# Patient Record
Sex: FEMALE | Race: WHITE | NOT HISPANIC OR LATINO | Employment: OTHER | URBAN - METROPOLITAN AREA
[De-identification: names, ages, dates, MRNs, and addresses within clinical notes are randomized per-mention and may not be internally consistent; named-entity substitution may affect disease eponyms.]

---

## 2017-01-04 DIAGNOSIS — E11.9 TYPE 2 DIABETES MELLITUS WITHOUT COMPLICATION, WITHOUT LONG-TERM CURRENT USE OF INSULIN: ICD-10-CM

## 2017-01-04 RX ORDER — METFORMIN HYDROCHLORIDE 500 MG/1
1000 TABLET, EXTENDED RELEASE ORAL 2 TIMES DAILY WITH MEALS
Qty: 360 TABLET | Refills: 3 | OUTPATIENT
Start: 2017-01-04

## 2017-01-04 NOTE — TELEPHONE ENCOUNTER
----- Message from Olya Madrigal sent at 1/3/2017  3:23 PM CST -----  Contact:  call/[phone]    Calling to   Discuss   Patient  metformin //  Pt  Is    Taking    More and   Script  Needs  To  Be   Updated  Pt is  Out  Of  meds // please call

## 2017-01-25 ENCOUNTER — LAB VISIT (OUTPATIENT)
Dept: LAB | Facility: HOSPITAL | Age: 52
End: 2017-01-25
Attending: INTERNAL MEDICINE
Payer: COMMERCIAL

## 2017-01-25 DIAGNOSIS — E11.9 TYPE 2 DIABETES MELLITUS WITHOUT COMPLICATION, WITHOUT LONG-TERM CURRENT USE OF INSULIN: ICD-10-CM

## 2017-01-25 DIAGNOSIS — M05.79 SEROPOSITIVE RHEUMATOID ARTHRITIS OF MULTIPLE SITES: ICD-10-CM

## 2017-01-25 DIAGNOSIS — E03.9 HYPOTHYROIDISM, UNSPECIFIED TYPE: ICD-10-CM

## 2017-01-25 LAB
ALBUMIN SERPL BCP-MCNC: 3.5 G/DL
ALP SERPL-CCNC: 93 U/L
ALT SERPL W/O P-5'-P-CCNC: 36 U/L
ANION GAP SERPL CALC-SCNC: 8 MMOL/L
ANION GAP SERPL CALC-SCNC: 8 MMOL/L
AST SERPL-CCNC: 26 U/L
BASOPHILS # BLD AUTO: ABNORMAL K/UL
BASOPHILS NFR BLD: 0 %
BILIRUB SERPL-MCNC: 0.2 MG/DL
BUN SERPL-MCNC: 18 MG/DL
BUN SERPL-MCNC: 18 MG/DL
CALCIUM SERPL-MCNC: 9.9 MG/DL
CALCIUM SERPL-MCNC: 9.9 MG/DL
CHLORIDE SERPL-SCNC: 105 MMOL/L
CHLORIDE SERPL-SCNC: 105 MMOL/L
CO2 SERPL-SCNC: 28 MMOL/L
CO2 SERPL-SCNC: 28 MMOL/L
CREAT SERPL-MCNC: 0.8 MG/DL
CREAT SERPL-MCNC: 0.8 MG/DL
CRP SERPL-MCNC: 8.4 MG/L
DIFFERENTIAL METHOD: ABNORMAL
EOSINOPHIL # BLD AUTO: ABNORMAL K/UL
EOSINOPHIL NFR BLD: 6 %
ERYTHROCYTE [DISTWIDTH] IN BLOOD BY AUTOMATED COUNT: 14.5 %
ERYTHROCYTE [SEDIMENTATION RATE] IN BLOOD BY WESTERGREN METHOD: 18 MM/HR
EST. GFR  (AFRICAN AMERICAN): >60 ML/MIN/1.73 M^2
EST. GFR  (AFRICAN AMERICAN): >60 ML/MIN/1.73 M^2
EST. GFR  (NON AFRICAN AMERICAN): >60 ML/MIN/1.73 M^2
EST. GFR  (NON AFRICAN AMERICAN): >60 ML/MIN/1.73 M^2
GLUCOSE SERPL-MCNC: 108 MG/DL
GLUCOSE SERPL-MCNC: 108 MG/DL
HCT VFR BLD AUTO: 42.4 %
HGB BLD-MCNC: 13.3 G/DL
LYMPHOCYTES # BLD AUTO: ABNORMAL K/UL
LYMPHOCYTES NFR BLD: 59 %
MCH RBC QN AUTO: 28.9 PG
MCHC RBC AUTO-ENTMCNC: 31.4 %
MCV RBC AUTO: 92 FL
MONOCYTES # BLD AUTO: ABNORMAL K/UL
MONOCYTES NFR BLD: 6 %
NEUTROPHILS NFR BLD: 29 %
PLATELET # BLD AUTO: 464 K/UL
PMV BLD AUTO: 10.7 FL
POTASSIUM SERPL-SCNC: 4.5 MMOL/L
POTASSIUM SERPL-SCNC: 4.5 MMOL/L
PROT SERPL-MCNC: 7.6 G/DL
RBC # BLD AUTO: 4.61 M/UL
SODIUM SERPL-SCNC: 141 MMOL/L
SODIUM SERPL-SCNC: 141 MMOL/L
T4 FREE SERPL-MCNC: 1.41 NG/DL
TSH SERPL DL<=0.005 MIU/L-ACNC: 0.04 UIU/ML
WBC # BLD AUTO: 13.66 K/UL

## 2017-01-25 PROCEDURE — 84439 ASSAY OF FREE THYROXINE: CPT

## 2017-01-25 PROCEDURE — 85651 RBC SED RATE NONAUTOMATED: CPT | Mod: PO

## 2017-01-25 PROCEDURE — 85007 BL SMEAR W/DIFF WBC COUNT: CPT

## 2017-01-25 PROCEDURE — 86140 C-REACTIVE PROTEIN: CPT

## 2017-01-25 PROCEDURE — 80053 COMPREHEN METABOLIC PANEL: CPT

## 2017-01-25 PROCEDURE — 85027 COMPLETE CBC AUTOMATED: CPT

## 2017-01-25 PROCEDURE — 84443 ASSAY THYROID STIM HORMONE: CPT

## 2017-01-25 PROCEDURE — 83036 HEMOGLOBIN GLYCOSYLATED A1C: CPT

## 2017-01-25 PROCEDURE — 36415 COLL VENOUS BLD VENIPUNCTURE: CPT | Mod: PO

## 2017-01-25 RX ORDER — DICLOFENAC SODIUM 75 MG/1
TABLET, DELAYED RELEASE ORAL
Qty: 180 TABLET | Refills: 0 | Status: SHIPPED | OUTPATIENT
Start: 2017-01-25 | End: 2018-01-25 | Stop reason: SDUPTHER

## 2017-01-26 ENCOUNTER — PATIENT OUTREACH (OUTPATIENT)
Dept: ADMINISTRATIVE | Facility: HOSPITAL | Age: 52
End: 2017-01-26
Payer: COMMERCIAL

## 2017-01-26 ENCOUNTER — PATIENT MESSAGE (OUTPATIENT)
Dept: FAMILY MEDICINE | Facility: CLINIC | Age: 52
End: 2017-01-26

## 2017-01-26 ENCOUNTER — PATIENT MESSAGE (OUTPATIENT)
Dept: ENDOCRINOLOGY | Facility: CLINIC | Age: 52
End: 2017-01-26

## 2017-01-26 LAB
ESTIMATED AVG GLUCOSE: 154 MG/DL
HBA1C MFR BLD HPLC: 7 %

## 2017-01-26 RX ORDER — LEVOTHYROXINE SODIUM 175 UG/1
175 TABLET ORAL DAILY
Qty: 90 TABLET | Refills: 3 | Status: SHIPPED | OUTPATIENT
Start: 2017-01-26 | End: 2018-01-30 | Stop reason: SDUPTHER

## 2017-01-26 NOTE — LETTER
February 3, 2017    Domi Olson Gibran  [address]  [place] [address]             [facility]  [address]  [facility] [address]  Phone: [phone] Dear Mrs. Leary:    We have tried to reach you by mychart unsuccessfully.    [facility] is committed to your overall health.  To help you get the most out of each of your visits, we will review your information to make sure you are up to date on all of your recommended tests and/or procedures.  We have Dr. Anoop Nicole listed as your primary care provider.     He has found that you may be due for your annual diabetic eye exam and colon cancer screening.     If you have had any of the above done at an outside facility, please let us know so I can update your record.  If you have a copy of these records, please provide a copy for us to scan into your chart.  If not, please provide that provider/facilities contact information so that we may obtain copies from that facility.     Otherwise, please schedule these appointments at your earliest convenience.  You are due for your diabetic follow up with Dr. Nicole in April of 2017.     If you have any questions or concerns, please don't hesitate to call.    Thank you for letting us care for you,  Yoanna Merino LPN Clinical Care Coordinator  [facility]  [phone]

## 2017-02-01 ENCOUNTER — OFFICE VISIT (OUTPATIENT)
Dept: ENDOCRINOLOGY | Facility: CLINIC | Age: 52
End: 2017-02-01
Payer: COMMERCIAL

## 2017-02-01 VITALS
DIASTOLIC BLOOD PRESSURE: 70 MMHG | HEIGHT: 63 IN | SYSTOLIC BLOOD PRESSURE: 122 MMHG | BODY MASS INDEX: 32.4 KG/M2 | HEART RATE: 83 BPM | WEIGHT: 182.88 LBS

## 2017-02-01 DIAGNOSIS — E66.9 OBESITY (BMI 30-39.9): ICD-10-CM

## 2017-02-01 DIAGNOSIS — M05.79 SEROPOSITIVE RHEUMATOID ARTHRITIS OF MULTIPLE SITES: ICD-10-CM

## 2017-02-01 DIAGNOSIS — E03.9 HYPOTHYROIDISM, UNSPECIFIED TYPE: ICD-10-CM

## 2017-02-01 DIAGNOSIS — E11.40 TYPE 2 DIABETES MELLITUS WITH DIABETIC NEUROPATHY, WITHOUT LONG-TERM CURRENT USE OF INSULIN: Primary | ICD-10-CM

## 2017-02-01 PROCEDURE — 99999 PR PBB SHADOW E&M-EST. PATIENT-LVL IV: CPT | Mod: PBBFAC,,, | Performed by: NURSE PRACTITIONER

## 2017-02-01 PROCEDURE — 2022F DILAT RTA XM EVC RTNOPTHY: CPT | Mod: S$GLB,,, | Performed by: NURSE PRACTITIONER

## 2017-02-01 PROCEDURE — 99214 OFFICE O/P EST MOD 30 MIN: CPT | Mod: S$GLB,,, | Performed by: NURSE PRACTITIONER

## 2017-02-01 PROCEDURE — 3060F POS MICROALBUMINURIA REV: CPT | Mod: S$GLB,,, | Performed by: NURSE PRACTITIONER

## 2017-02-01 PROCEDURE — 3045F PR MOST RECENT HEMOGLOBIN A1C LEVEL 7.0-9.0%: CPT | Mod: S$GLB,,, | Performed by: NURSE PRACTITIONER

## 2017-02-01 RX ORDER — PRAVASTATIN SODIUM 10 MG/1
10 TABLET ORAL DAILY
Qty: 30 TABLET | Refills: 6 | Status: SHIPPED | OUTPATIENT
Start: 2017-02-01 | End: 2017-10-18 | Stop reason: SDUPTHER

## 2017-02-01 NOTE — MR AVS SNAPSHOT
[facility]  [address]  [place] [address]  Phone: [phone]  Fax: [phone]                  Domi Leary   2017 9:00 AM   Office Visit    Description:  Female : 1965   Provider:  LLUY El,FNP-C   Department:  [facility]           Reason for Visit     Diabetes Mellitus           Diagnoses this Visit        Comments    Type 2 diabetes mellitus with diabetic neuropathy, without long-term current use of insulin    -  Primary     Hypothyroidism, unspecified type         Obesity (BMI 30-39.9)         Seropositive rheumatoid arthritis of multiple sites                To Do List           Future Appointments        Provider Department Dept Phone    2/15/2017 9:30 AM MAYELA King OD [place] - Optometry [phone]    2017 10:00 AM Humphrey Al MD [facility] - Rheumatology [phone]    3/14/2017 8:15 AM LAB, [place] [facility] [phone]    2017 7:00 AM SPECIMEN, [place] [facility] [phone]    2017 10:00 AM LAB, [place] [facility] [phone]      Goals (5 Years of Data)     None      Follow-Up and Disposition     Return in about 4 months (around 2017).       These Medications        Disp Refills Start End    pravastatin (PRAVACHOL) 10 MG tablet 30 tablet 6 2017    Take 1 tablet (10 mg total) by mouth once daily. - Oral    [facility] [address] [place] [address] [address] [place] [address] AT [facility] Ph #: [phone]         [facility] On Call     [facility] On Call Nurse Care Line -  Assistance  Registered nurses in the [facility] provide clinical advisement, health education, appointment booking, and other advisory services.  Call for this free service at [phone].             Medications           Message regarding Medications     Verify the changes and/or additions  "to your medication regime listed below are the same as discussed with your clinician today.  If any of these changes or additions are incorrect, please notify your healthcare provider.        START taking these NEW medications        Refills    pravastatin (PRAVACHOL) 10 MG tablet 6    Sig: Take 1 tablet (10 mg total) by mouth once daily.    Class: Normal    Route: Oral           Verify that the below list of medications is an accurate representation of the medications you are currently taking.  If none reported, the list may be blank. If incorrect, please contact your healthcare provider. Carry this list with you in case of emergency.           Current Medications     blood sugar diagnostic (BLOOD GLUCOSE TEST) Strp 1 strip by Misc.(Non-Drug; Combo Route) route once daily. Accu check compact test strips. Check blood sugar daily.    canagliflozin (INVOKANA) 100 mg Tab Take 1 tablet (100 mg total) by mouth once daily.    diclofenac (VOLTAREN) 75 MG EC tablet Take 1 tablet (75 mg total) by mouth 2 (two) times daily.    diclofenac (VOLTAREN) 75 MG EC tablet TAKE 1 TABLET BY MOUTH TWICE DAILY    etanercept (ENBREL SURECLICK) 50 mg/mL (0.98 mL) PnIj Inject 50 mg into the skin every 7 days.    glimepiride (AMARYL) 4 MG tablet TAKE 2 TABLETS BY MOUTH EVERY DAY    insulin needles, disposable, (ULTRA-THIN II INS PEN NEEDLES) 29 x 1/2 " Ndle Pen needles used for victoza pen    leflunomide (ARAVA) 20 MG Tab TAKE 1 TABLET(20 MG) BY MOUTH EVERY DAY    leflunomide (ARAVA) 20 MG Tab TAKE 1 TABLET(20 MG) BY MOUTH EVERY DAY    levothyroxine (SYNTHROID, LEVOTHROID) 175 MCG tablet Take 1 tablet (175 mcg total) by mouth once daily.    liraglutide 0.6 mg/0.1 mL, 18 mg/3 mL, subq PNIJ (VICTOZA 3-MOOSE) 0.6 mg/0.1 mL (18 mg/3 mL) PnIj Inject 1.8 mg into the skin once daily.    metformin (GLUCOPHAGE-XR) 500 MG 24 hr tablet Take 2 tablets (1,000 mg total) by mouth 2 (two) times daily with meals.    terconazole (TERAZOL 3) 0.8 % vaginal cream " "Place 1 applicator vaginally once daily.    tramadol (ULTRAM) 50 mg tablet TAKE 1 TABLET BY MOUTH EVERY 6 HOURS AS NEEDED    pravastatin (PRAVACHOL) 10 MG tablet Take 1 tablet (10 mg total) by mouth once daily.           Clinical Reference Information           Vital Signs - Last Recorded  Most recent update: 2/1/2017  8:49 AM by Arash Ortega LPN    BP Pulse Ht Wt BMI    122/70 (BP Location: Right arm, Patient Position: Sitting, BP Method: Manual) 83 5' 3" (1.6 m) 83 kg (182 lb 14 oz) 32.39 kg/m2      Blood Pressure          Most Recent Value    BP  122/70      Allergies as of 2/1/2017     Amoxil [Amoxicillin]      Immunizations Administered on Date of Encounter - 2/1/2017     None      Orders Placed During Today's Visit     Future Labs/Procedures Expected by Expk[facility]    Comprehensive metabolic panel  2/1/2017 4/2/2018    Hemoglobin A1c  2/1/2017 4/2/2018    Lipid panel  2/1/2017 4/2/2018    Microalbumin/creatinine urine ratio  2/1/2017 4/2/2018      "

## 2017-02-01 NOTE — PROGRESS NOTES
CC: Ms. Domi Leary arrives today for management of Type 2 DM and review of chronic medical conditions, as listed in the Visit Diagnosis section of this encounter.       HPI: Ms. Domi Leary was diagnosed with Type 2 DM in ~2010. She was diagnosed based on lab work while taking steroids for RA. Initial treatment consisted of metformin. Glimepiride added a few years later. Victoza added in 2015. + FH of DM in 2 maternal aunts. Denies hospitalizations due to DM.     At last visit, her metformin dose was increased and she was started on Invokana.     Brings log. BG readings are checked 1x/day (fasting only). Brings logs.            Hypoglycemia: No    Missing Insulin/PO medication doses: No  Timing prandial insulin 5-15 minutes before meals: n/a    Exercise: Not often but plays volleyball once/week    Dietary Habits: Eats 3 meals/day. Mid afternoon snack and HS snack of crackers or nuts. Avoids sugary beverages.     Last DM education appointment: 2015    She reports her thyroid lab work was abnormal. Dr. Nicole just reduced her levothyroxine dose.     Takes leflunomide and Enbrel for RA. Sees Dr. Al.      CURRENT DIABETIC MEDS: Glucophage XR 1000 mg BID, glimepiride 4 mg daily, Invokana 100 mg daily, Victoza 1.8 mg daily  Glucometer type: Accucheck compact Plus    Previous DM treatments:  Metformin - diarrhea    Last Eye Exam: 2013. Plans to schedule soon  Last Podiatry Exam: 2012    REVIEW OF SYSTEMS  Constitutional: no c/o fatigue, weakness. + 6 lb weight loss.   Eyes: denies visual disturbances.  Cardiac: no palpitations or chest pain.  Respiratory: no cough or dyspnea.  GI: c/o abdominal pain occasional abd pain. Had nausea yesterday.  Skin: no lesions. C/o rash on arm, legs that waxes and wanes. Has seen dermatologist  Neuro: no numbness, tingling, or parasthesias.  Endocrine: denies polyphagia, polydipsia, polyuria      Personally reviewed Past Medical, Surgical, Social History.    Vital  "Signs  Visit Vitals    /70 (BP Location: Right arm, Patient Position: Sitting, BP Method: Manual)    Pulse 83    Ht 5' 3" (1.6 m)    Wt 83 kg (182 lb 14 oz)    BMI 32.39 kg/m2       Personally reviewed the below labs:    Hemoglobin A1C   Date Value Ref Range Status   01/25/2017 7.0 (H) 4.5 - 6.2 % Final     Comment:     According to ADA guidelines, hemoglobin A1C <7.0% represents  optimal control in non-pregnant diabetic patients.  Different  metrics may apply to specific populations.   Standards of Medical Care in Diabetes - 2016.  For the purpose of screening for the presence of diabetes:  <5.7%     Consistent with the absence of diabetes  5.7-6.4%  Consistent with increasing risk for diabetes   (prediabetes)  >or=6.5%  Consistent with diabetes  Currently no consensus exists for use of hemoglobin A1C  for diagnosis of diabetes for children.     10/24/2016 10.1 (H) 4.5 - 6.2 % Final     Comment:     According to ADA guidelines, hemoglobin A1C <7.0% represents  optimal control in non-pregnant diabetic patients.  Different  metrics may apply to specific populations.   Standards of Medical Care in Diabetes - 2016.  For the purpose of screening for the presence of diabetes:  <5.7%     Consistent with the absence of diabetes  5.7-6.4%  Consistent with increasing risk for diabetes   (prediabetes)  >or=6.5%  Consistent with diabetes  Currently no consensus exists for use of hemoglobin A1C  for diagnosis of diabetes for children.     07/25/2016 8.1 (H) 4.5 - 6.2 % Final     Comment:     According to ADA guidelines, hemoglobin A1C <7.0% represents  optimal control in non-pregnant diabetic patients.  Different  metrics may apply to specific populations.   Standards of Medical Care in Diabetes - 2016.  For the purpose of screening for the presence of diabetes:  <5.7%     Consistent with the absence of diabetes  5.7-6.4%  Consistent with increasing risk for diabetes   (prediabetes)  >or=6.5%  Consistent with " diabetes  Currently no consensus exists for use of hemoglobin A1C  for diagnosis of diabetes for children.         Chemistry        Component Value Date/Time     01/25/2017 0850     01/25/2017 0850    K 4.5 01/25/2017 0850    K 4.5 01/25/2017 0850     01/25/2017 0850     01/25/2017 0850    CO2 28 01/25/2017 0850    CO2 28 01/25/2017 0850    BUN 18 01/25/2017 0850    BUN 18 01/25/2017 0850    CREATININE 0.8 01/25/2017 0850    CREATININE 0.8 01/25/2017 0850     01/25/2017 0850     01/25/2017 0850        Component Value Date/Time    CALCIUM 9.9 01/25/2017 0850    CALCIUM 9.9 01/25/2017 0850    ALKPHOS 93 01/25/2017 0850    AST 26 01/25/2017 0850    ALT 36 01/25/2017 0850    BILITOT 0.2 01/25/2017 0850          Lab Results   Component Value Date    CHOL 164 07/25/2016    CHOL 182 02/17/2016    CHOL 231 (H) 10/24/2014     Lab Results   Component Value Date    HDL 57 07/25/2016    HDL 48 02/17/2016    HDL 57 10/24/2014     Lab Results   Component Value Date    LDLCALC 77.8 07/25/2016    LDLCALC 89.6 02/17/2016    LDLCALC 103.2 10/24/2014     Lab Results   Component Value Date    TRIG 146 07/25/2016    TRIG 222 (H) 02/17/2016    TRIG 354 (H) 10/24/2014     Lab Results   Component Value Date    CHOLHDL 34.8 07/25/2016    CHOLHDL 26.4 02/17/2016    CHOLHDL 24.7 10/24/2014       Lab Results   Component Value Date    MICALBCREAT 9.2 07/25/2016     Lab Results   Component Value Date    TSH 0.035 (L) 01/25/2017       Estimated Creatinine Clearance: 85.8 mL/min (based on Cr of 0.8).    No results found for: [id number]      PHYSICAL EXAMINATION  Constitutional: Appears well, no distress  Neck: Supple, trachea midline; no thyromegaly or nodules.   Respiratory: CTA, even and unlabored.  Cardiovascular: RRR, no murmurs, no carotid bruits. DP pulses  2+ bilaterally; no edema.    Lymph: no cervical or supraclavicular lymphadenopathy  Skin: warm and dry; no lipohypertrophy, or acanthosis nigracans  observed.  Neuro: DTR 2+ BUE/2+BLE. At last visit, no loss of protective sensation via 10 gm monofilament. Vibratory exam mildly decreased bilaterally.  Feet: appropriate footwear.      Assessment/Plan  1. Type 2 diabetes mellitus without complication, without long-term current use of insulin  -- A1c has decreased from 10.1% to 7%.   -- no change to medications.   -- check BG 1x/day, alternating times  -- discussed adding statin for cardioprotection. Explained current recommendations and ADA guidelines. Will order low dose statin - pravastatin 10 mg daily. Discussed medication's mechanism of action, side effects, and contraindications.   -- CMP, lipid panel in 6 weeks.    -- Discussed diagnosis of DM, A1c goals, progression of disease, long term complications and tx options.  Advised patient to check BG before activities, such as driving or exercise.  -- Reviewed hypoglycemia management: treat with 1/2 glass of juice, 1/2 can regular coke, or 4 glucose tablets. Monitor and repeat treatment every 15 minutes until BG is >70 Then have a snack, which includes a complex carbohydrate and protein.     2. Hypothyroidism, unspecified type  -- dose recently adjusted   Lab Results   Component Value Date    TSH 0.035 (L) 01/25/2017      3. Obesity (BMI 30-39.9)  -- increases insulin resistance  Body mass index is 32.39 kg/(m^2).   4. Seropositive rheumatoid arthritis of multiple sites  -- followed by Rheum       FOLLOW UP  Return in about 4 months (around 6/1/2017).   Patient instructed to bring BG logs to each follow up   Patient encouraged to call for any BG/medication issues, concerns, or questions.    Orders Placed This Encounter   Procedures    Comprehensive metabolic panel    Hemoglobin A1c    Lipid panel    Microalbumin/creatinine urine ratio

## 2017-02-15 ENCOUNTER — INITIAL CONSULT (OUTPATIENT)
Dept: OPTOMETRY | Facility: CLINIC | Age: 52
End: 2017-02-15
Payer: COMMERCIAL

## 2017-02-15 DIAGNOSIS — H43.393 VITREOUS FLOATERS, BILATERAL: ICD-10-CM

## 2017-02-15 DIAGNOSIS — Z13.5 GLAUCOMA SCREENING: ICD-10-CM

## 2017-02-15 DIAGNOSIS — H52.4 HYPEROPIA WITH PRESBYOPIA, BILATERAL: ICD-10-CM

## 2017-02-15 DIAGNOSIS — E11.9 DIABETES MELLITUS TYPE 2 WITHOUT RETINOPATHY: Primary | ICD-10-CM

## 2017-02-15 DIAGNOSIS — H52.03 HYPEROPIA WITH PRESBYOPIA, BILATERAL: ICD-10-CM

## 2017-02-15 PROCEDURE — 92015 DETERMINE REFRACTIVE STATE: CPT | Mod: S$GLB,,, | Performed by: OPTOMETRIST

## 2017-02-15 PROCEDURE — 99999 PR PBB SHADOW E&M-EST. PATIENT-LVL II: CPT | Mod: PBBFAC,,, | Performed by: OPTOMETRIST

## 2017-02-15 PROCEDURE — 92004 COMPRE OPH EXAM NEW PT 1/>: CPT | Mod: S$GLB,,, | Performed by: OPTOMETRIST

## 2017-02-15 NOTE — LETTER
February 15, 2017      Anoop Nicole MD  [address]  [facility] [address]           [facility]  [address] [facility] [address] [place] [address]  Phone: [phone]  Fax: [phone]          Patient: Domi Leary   MR Number: [id number]   YOB: 1965   Date of Visit: 2/15/2017       Dear Dr. Anoop Nicole:    Thank you for referring Domi Leary to me for evaluation. Attached you will find relevant portions of my assessment and plan of care.    If you have questions, please do not hesitate to call me. I look forward to following Domi Leary along with you.    Sincerely,    MAYELA King, OD    Enclosure  CC:  No Recipients    If you would like to receive this communication electronically, please contact [email or web address] or [phone] to request more information on [facility] access.    For providers and/or their staff who would like to refer a patient to [facility], please contact us through our one-stop-shop provider referral line, [facility], at [phone].    If you feel you have received this communication in error or would no longer like to receive these types of communications, please e-mail [email or web address]

## 2017-02-15 NOTE — MR AVS SNAPSHOT
[facility] - [facility]  [address] [facility] [address]  [facility] [address]  Phone: [phone]  Fax: [phone]                  Domi Leary   2/15/2017 9:30 AM   Initial consult    Description:  Female : 1965   Provider:  MAYELA King, OD   Department:  [facility] [place] - Optometry           Reason for Visit     Annual Exam     Diabetic Eye Exam     Blurred Vision           Diagnoses this Visit        Comments    Diabetes mellitus type 2 without retinopathy    -  Primary     Vitreous floaters, bilateral         Glaucoma screening         Hyperopia with presbyopia, bilateral                To Do List           Future Appointments        Provider Department Dept Phone    2017 10:00 AM Humphrey Al MD [facility] - Rheumatology [phone]    3/14/2017 8:15 AM LAB, [place] [facility] [phone]    2017 7:00 AM SPECIMEN, [place] [facility] [phone]    2017 10:00 AM LAB, [place] [facility] [phone]    2017 10:00 AM LULY El,FNP-C [place] - Endocrinology [phone]      Goals (5 Years of Data)     None      Follow-Up and Disposition     Return in about 1 year (around 2/15/2018) for Annual Diabetic Eye Exam.      [facility]     [facility] Nurse Care Line -  Assistance  Registered nurses in the [facility] provide clinical advisement, health education, appointment booking, and other advisory services.  Call for this free service at [phone].             Medications           Message regarding Medications     Verify the changes and/or additions to your medication regime listed below are the same as discussed with your clinician today.  If any of these changes or additions are incorrect, please notify your healthcare provider.             Verify that the below list of medications is an accurate representation of the medications you are currently taking.  If none reported,  "the list may be blank. If incorrect, please contact your healthcare provider. Carry this list with you in case of emergency.           Current Medications     blood sugar diagnostic (BLOOD GLUCOSE TEST) Strp 1 strip by Misc.(Non-Drug; Combo Route) route once daily. Accu check compact test strips. Check blood sugar daily.    canagliflozin (INVOKANA) 100 mg Tab Take 1 tablet (100 mg total) by mouth once daily.    diclofenac (VOLTAREN) 75 MG EC tablet Take 1 tablet (75 mg total) by mouth 2 (two) times daily.    diclofenac (VOLTAREN) 75 MG EC tablet TAKE 1 TABLET BY MOUTH TWICE DAILY    etanercept (ENBREL SURECLICK) 50 mg/mL (0.98 mL) PnIj Inject 50 mg into the skin every 7 days.    glimepiride (AMARYL) 4 MG tablet TAKE 2 TABLETS BY MOUTH EVERY DAY    insulin needles, disposable, (ULTRA-THIN II INS PEN NEEDLES) 29 x 1/2 " Ndle Pen needles used for victoza pen    leflunomide (ARAVA) 20 MG Tab TAKE 1 TABLET(20 MG) BY MOUTH EVERY DAY    leflunomide (ARAVA) 20 MG Tab TAKE 1 TABLET(20 MG) BY MOUTH EVERY DAY    levothyroxine (SYNTHROID, LEVOTHROID) 175 MCG tablet Take 1 tablet (175 mcg total) by mouth once daily.    liraglutide 0.6 mg/0.1 mL, 18 mg/3 mL, subq PNIJ (VICTOZA 3-MOOSE) 0.6 mg/0.1 mL (18 mg/3 mL) PnIj Inject 1.8 mg into the skin once daily.    metformin (GLUCOPHAGE-XR) 500 MG 24 hr tablet Take 2 tablets (1,000 mg total) by mouth 2 (two) times daily with meals.    pravastatin (PRAVACHOL) 10 MG tablet Take 1 tablet (10 mg total) by mouth once daily.    terconazole (TERAZOL 3) 0.8 % vaginal cream Place 1 applicator vaginally once daily.    tramadol (ULTRAM) 50 mg tablet TAKE 1 TABLET BY MOUTH EVERY 6 HOURS AS NEEDED           Clinical Reference Information           Allergies as of 2/15/2017     Amoxil [Amoxicillin]      Immunizations Administered on Date of Encounter - 2/15/2017     None      Instructions    DIABETES AND THE EYE / DIABETIC RETINOPATHY    Diabetic retinopathy is a condition occurring in persons with " diabetes, which causes progressive damage to the retina, the light sensitive lining at the back of the eye. It is a serious sight-threatening complication of diabetes.    Diabetic retinopathy is the result of damage to the tiny blood vessels that nourish the retina. They leak blood and other fluids that cause swelling of retinal tissue and clouding of vision. The condition usually affects both eyes. The longer a person has diabetes, the more likely they will develop diabetic retinopathy. If left untreated, diabetic retinopathy can cause blindness.  There are two basic types of diabetic retinopathy:    Background or nonproliferative diabetic retinopathy (NPDR)  Nonproliferative diabetic retinopathy (NPDR) is the earliest stage of diabetic retinopathy. With this condition, damaged blood vessels in the retina begin to leak extra fluid and small amounts of blood into the eye. Sometimes, deposits of cholesterol or other fats from the blood may leak into the retina. Many people with diabetes have mild NPDR, which usually does not affect their vision. However, if their vision is affected, it is the result of macular edema and macular ischemia.    If vision is affected due to macular changes, a consult with a Retina Specialist may be advised.  This is an ophthalmologist that treats retina conditions, including diabetic retinopathy.     Proliferative diabetic retinopathy (PDR)  Proliferative diabetic retinopathy (PDR) mainly occurs when many of the blood vessels in the retina close, preventing enough blood flow. In an attempt to supply blood to the area where the original vessels closed, the retina responds by growing new blood vessels. This is called neovascularization. However, these new blood vessels are abnormal and do not supply the retina with proper blood flow. The new vessels are also often accompanied by scar tissue that may cause the retina to wrinkle or detach. PDR may cause more severe vision loss than NPDR  "because it can affect both central and peripheral vision.     A patient diagnosed with proliferative diabetic eye disease will be referred to a retinal specialist for consultation.    Often there are no visual symptoms in the early stages of diabetic retinopathy. That is why our eye care professionals recommend that everyone with diabetes have a comprehensive dilated eye examination once a year. Early detection and treatment can limit the potential for significant vision loss from diabetic retinopathy.        FLASHES / FLOATERS / POSTERIOR VITREOUS DETACHMENT    Call the clinic if you have any further changes in symptoms.  Including:  Increased numbers of floaters or flashing lights, dimness or darkness that moves through or stays constant in your vision, or any pain in the eye (s).            DRY EYES:  Use Over The Counter artificial tears as needed for dry eye symptoms.  Some common brands include:  Systane, Optive, and Refresh.  These drops can be used as frequently as desired, but may be most helpful use during long periods of concentrated work.  For example, reading / working at the computer.  Avoid drops that "get redness out", as these contain medication that may further irritate the eyes.    ALLERGY EYES / SYMPTOMS:    Over the counter medications include--Zaditor and Alaway  Use as directed 1-2 drops daily for symptoms of itching / watering eyes.  These drops will not help for dry eye or exposure symptoms.           Language Assistance Services     ATTENTION: Language assistance services are available, free of charge. Please call [phone].      ATENCIÓN: Si chevy [facility]ner[facility], tiene a lora disposición servicios gratuitos de asistencia lingüística. Llame al [phone].     TONI Ý: N?u b?n nói Ti?ng Vi?t, có các d?ch v? h? tr? ngôn ng? mi?n phí dành cho b?n. G?i s? [phone].         [place] [facility] complies with applicable Federal civil rights laws and does not discriminate on the basis of " race, color, national origin, age, disability, or sex.

## 2017-02-15 NOTE — PATIENT INSTRUCTIONS
DIABETES AND THE EYE / DIABETIC RETINOPATHY    Diabetic retinopathy is a condition occurring in persons with diabetes, which causes progressive damage to the retina, the light sensitive lining at the back of the eye. It is a serious sight-threatening complication of diabetes.    Diabetic retinopathy is the result of damage to the tiny blood vessels that nourish the retina. They leak blood and other fluids that cause swelling of retinal tissue and clouding of vision. The condition usually affects both eyes. The longer a person has diabetes, the more likely they will develop diabetic retinopathy. If left untreated, diabetic retinopathy can cause blindness.  There are two basic types of diabetic retinopathy:    Background or nonproliferative diabetic retinopathy (NPDR)  Nonproliferative diabetic retinopathy (NPDR) is the earliest stage of diabetic retinopathy. With this condition, damaged blood vessels in the retina begin to leak extra fluid and small amounts of blood into the eye. Sometimes, deposits of cholesterol or other fats from the blood may leak into the retina. Many people with diabetes have mild NPDR, which usually does not affect their vision. However, if their vision is affected, it is the result of macular edema and macular ischemia.    If vision is affected due to macular changes, a consult with a Retina Specialist may be advised.  This is an ophthalmologist that treats retina conditions, including diabetic retinopathy.     Proliferative diabetic retinopathy (PDR)  Proliferative diabetic retinopathy (PDR) mainly occurs when many of the blood vessels in the retina close, preventing enough blood flow. In an attempt to supply blood to the area where the original vessels closed, the retina responds by growing new blood vessels. This is called neovascularization. However, these new blood vessels are abnormal and do not supply the retina with proper blood flow. The new vessels are also often accompanied by scar  "tissue that may cause the retina to wrinkle or detach. PDR may cause more severe vision loss than NPDR because it can affect both central and peripheral vision.     A patient diagnosed with proliferative diabetic eye disease will be referred to a retinal specialist for consultation.    Often there are no visual symptoms in the early stages of diabetic retinopathy. That is why our eye care professionals recommend that everyone with diabetes have a comprehensive dilated eye examination once a year. Early detection and treatment can limit the potential for significant vision loss from diabetic retinopathy.        FLASHES / FLOATERS / POSTERIOR VITREOUS DETACHMENT    Call the clinic if you have any further changes in symptoms.  Including:  Increased numbers of floaters or flashing lights, dimness or darkness that moves through or stays constant in your vision, or any pain in the eye (s).            DRY EYES:  Use Over The Counter artificial tears as needed for dry eye symptoms.  Some common brands include:  Systane, Optive, and Refresh.  These drops can be used as frequently as desired, but may be most helpful use during long periods of concentrated work.  For example, reading / working at the computer.  Avoid drops that "get redness out", as these contain medication that may further irritate the eyes.    ALLERGY EYES / SYMPTOMS:    Over the counter medications include--Zaditor and Alaway  Use as directed 1-2 drops daily for symptoms of itching / watering eyes.  These drops will not help for dry eye or exposure symptoms.      "

## 2017-02-15 NOTE — PROGRESS NOTES
HPI     Annual Exam    Additional comments: DLE 4-13 (ayush)    ocular health exam            Diabetic Eye Exam    Additional comments: BSL controlled           Blurred Vision    Additional comments: at near only -- distance VA good           Comments   Hemoglobin A1C       Date                     Value               Ref Range             Status                01/25/2017               7.0 (H)             4.5 - 6.2 %           Final              Comment:    According to ADA guidelines, hemoglobin A1C <7.0% represents  optimal   control in non-pregnant diabetic patients.  Different  metrics may apply   to specific populations.   Standards of Medical Care in Diabetes -   2016.  For the purpose of screening for the presence of diabetes:  <5.7%       Consistent with the absence of diabetes  5.7-6.4%  Consistent with   increasing risk for diabetes   (prediabetes)  >or=6.5%  Consistent with   diabetes  Currently no consensus exists for use of hemoglobin A1C  for   diagnosis of diabetes for children.         10/24/2016               10.1 (H)            4.5 - 6.2 %           Final              Comment:    According to ADA guidelines, hemoglobin A1C <7.0% represents  optimal   control in non-pregnant diabetic patients.  Different  metrics may apply   to specific populations.   Standards of Medical Care in Diabetes -   2016.  For the purpose of screening for the presence of diabetes:  <5.7%       Consistent with the absence of diabetes  5.7-6.4%  Consistent with   increasing risk for diabetes   (prediabetes)  >or=6.5%  Consistent with   diabetes  Currently no consensus exists for use of hemoglobin A1C  for   diagnosis of diabetes for children.         07/25/2016               8.1 (H)             4.5 - 6.2 %           Final              Comment:    According to ADA guidelines, hemoglobin A1C <7.0% represents  optimal   control in non-pregnant diabetic patients.  Different  metrics may apply   to specific populations.    Standards of Medical Care in Diabetes -   2016.  For the purpose of screening for the presence of diabetes:  <5.7%       Consistent with the absence of diabetes  5.7-6.4%  Consistent with   increasing risk for diabetes   (prediabetes)  >or=6.5%  Consistent with   diabetes  Currently no consensus exists for use of hemoglobin A1C  for   diagnosis of diabetes for children.    ----------         Last edited by Che Heck on 2/15/2017  9:42 AM. (History)            Assessment /Plan     For exam results, see Encounter Report.    Diabetes mellitus type 2 without retinopathy    Vitreous floaters, bilateral    Glaucoma screening    Hyperopia with presbyopia, bilateral      1. No ret/ no csme, gave info, control glucose, annual DFE  2. Mild OU, RD precautions given  3. Not suspect  4. Updated specs rx, gave copy  Ok continue with otc only    Discussed and educated patient on current findings /plan.  RTC 1 year, prn if any changes / issues

## 2017-03-10 ENCOUNTER — TELEPHONE (OUTPATIENT)
Dept: FAMILY MEDICINE | Facility: CLINIC | Age: 52
End: 2017-03-10

## 2017-03-10 NOTE — TELEPHONE ENCOUNTER
----- Message from Es Olivo sent at 3/10/2017  3:19 PM CST -----  Contact: patient  Patient returning a missed call about an appt. Please advise. Call to pod. Call connected to pod. Warm transferred.  Thanks!

## 2017-03-29 ENCOUNTER — OFFICE VISIT (OUTPATIENT)
Dept: RHEUMATOLOGY | Facility: CLINIC | Age: 52
End: 2017-03-29
Payer: COMMERCIAL

## 2017-03-29 VITALS
DIASTOLIC BLOOD PRESSURE: 81 MMHG | HEIGHT: 63 IN | HEART RATE: 81 BPM | WEIGHT: 183.19 LBS | SYSTOLIC BLOOD PRESSURE: 128 MMHG | BODY MASS INDEX: 32.46 KG/M2

## 2017-03-29 DIAGNOSIS — M05.79 SEROPOSITIVE RHEUMATOID ARTHRITIS OF MULTIPLE SITES: Primary | ICD-10-CM

## 2017-03-29 DIAGNOSIS — Z79.60 LONG-TERM USE OF IMMUNOSUPPRESSANT MEDICATION: ICD-10-CM

## 2017-03-29 PROCEDURE — 1160F RVW MEDS BY RX/DR IN RCRD: CPT | Mod: S$GLB,,, | Performed by: INTERNAL MEDICINE

## 2017-03-29 PROCEDURE — 99999 PR PBB SHADOW E&M-EST. PATIENT-LVL III: CPT | Mod: PBBFAC,,, | Performed by: INTERNAL MEDICINE

## 2017-03-29 PROCEDURE — 99213 OFFICE O/P EST LOW 20 MIN: CPT | Mod: S$GLB,,, | Performed by: INTERNAL MEDICINE

## 2017-03-29 ASSESSMENT — ROUTINE ASSESSMENT OF PATIENT INDEX DATA (RAPID3)
PAIN SCORE: 2
TOTAL RAPID3 SCORE: 1
FATIGUE SCORE: 2
PATIENT GLOBAL ASSESSMENT SCORE: 1
PSYCHOLOGICAL DISTRESS SCORE: 0
AM STIFFNESS SCORE: 0, NO
MDHAQ FUNCTION SCORE: 0

## 2017-03-29 NOTE — PROGRESS NOTES
History of present illness: 52-year-old female with a history of rheumatoid arthritis dating back to 2003. She had rather mild disease initially although she did form nodules early. Then she was found to have neutropenia, recurrent infections, and splenomegaly. 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 In 2009 her arthritis started flaring. She was placed on leflunomide at that time. She had previously failed hydroxychloroquine. In April 2013 she had another flare of her arthritis. Enbrel was added at that time. She has been on the combination of Enbrel and leflunomide since. She also takes diclofenac on a regular basis.    Her arthritis is been doing quite well.  She has occasional swelling in the left knee.  She has had some discomfort.  Her other joints have been stable.  She has not been using heat or ice on the knee.  It does not interfere with activity.  Her diabetes has been stable.  She has had no other recent medical problems.    Physical examination:  Musculoskeletal: She has slight subluxation of the right second and third MCP but no synovitis.  She has bony hypertrophy of the left knee but no effusion.  She has no tenderness to palpation.  Right knee is unremarkable.    Assessment: Stable rheumatoid arthritis    Plans:  1.  Continue medications as before  2.  She will be due for laboratory studies in June  3.  Return to see me in 6 months

## 2017-04-26 RX ORDER — DICLOFENAC SODIUM 75 MG/1
TABLET, DELAYED RELEASE ORAL
Qty: 180 TABLET | Refills: 3 | Status: SHIPPED | OUTPATIENT
Start: 2017-04-26 | End: 2018-01-25 | Stop reason: SDUPTHER

## 2017-06-15 DIAGNOSIS — E11.9 TYPE 2 DIABETES MELLITUS WITHOUT COMPLICATION, WITHOUT LONG-TERM CURRENT USE OF INSULIN: ICD-10-CM

## 2017-07-05 RX ORDER — TRIAMCINOLONE ACETONIDE 1 MG/G
OINTMENT TOPICAL 2 TIMES DAILY
COMMUNITY
End: 2017-07-05 | Stop reason: SDUPTHER

## 2017-07-06 RX ORDER — TRIAMCINOLONE ACETONIDE 1 MG/G
OINTMENT TOPICAL 2 TIMES DAILY
Qty: 453.6 G | Refills: 1 | Status: SHIPPED | OUTPATIENT
Start: 2017-07-06 | End: 2021-05-12

## 2017-08-18 DIAGNOSIS — Z12.11 COLON CANCER SCREENING: ICD-10-CM

## 2017-09-08 RX ORDER — TRAMADOL HYDROCHLORIDE 50 MG/1
TABLET ORAL
Qty: 60 TABLET | Refills: 0 | Status: SHIPPED | OUTPATIENT
Start: 2017-09-08 | End: 2017-10-16 | Stop reason: SDUPTHER

## 2017-09-11 RX ORDER — TRAMADOL HYDROCHLORIDE 50 MG/1
TABLET ORAL
Qty: 60 TABLET | Refills: 0 | OUTPATIENT
Start: 2017-09-11

## 2017-09-11 NOTE — TELEPHONE ENCOUNTER
----- Message from Thais Reed sent at 9/11/2017  9:32 AM CDT -----  Contact: self  Patient states need prescription for tramadol sent to [facility]

## 2017-10-16 RX ORDER — TRAMADOL HYDROCHLORIDE 50 MG/1
TABLET ORAL
Qty: 60 TABLET | Refills: 0 | Status: SHIPPED | OUTPATIENT
Start: 2017-10-16 | End: 2018-01-25 | Stop reason: SDUPTHER

## 2017-10-17 RX ORDER — GLIMEPIRIDE 4 MG/1
TABLET ORAL
Qty: 180 TABLET | Refills: 0 | Status: SHIPPED | OUTPATIENT
Start: 2017-10-17 | End: 2021-02-18 | Stop reason: SDUPTHER

## 2017-10-18 DIAGNOSIS — E11.40 TYPE 2 DIABETES MELLITUS WITH DIABETIC NEUROPATHY, WITHOUT LONG-TERM CURRENT USE OF INSULIN: ICD-10-CM

## 2017-10-18 RX ORDER — PRAVASTATIN SODIUM 10 MG/1
10 TABLET ORAL DAILY
Qty: 30 TABLET | Refills: 4 | Status: SHIPPED | OUTPATIENT
Start: 2017-10-18 | End: 2018-05-09 | Stop reason: SDUPTHER

## 2017-12-21 RX ORDER — ETANERCEPT 50 MG/ML
SOLUTION SUBCUTANEOUS
Qty: 3.92 ML | Refills: 2 | OUTPATIENT
Start: 2017-12-21

## 2018-01-02 DIAGNOSIS — E11.9 TYPE 2 DIABETES MELLITUS WITHOUT COMPLICATION, WITHOUT LONG-TERM CURRENT USE OF INSULIN: ICD-10-CM

## 2018-01-04 ENCOUNTER — PATIENT OUTREACH (OUTPATIENT)
Dept: ADMINISTRATIVE | Facility: HOSPITAL | Age: 53
End: 2018-01-04

## 2018-01-04 NOTE — PROGRESS NOTES
DM registry, over 1 year.  Called pt to inform:    Due for an office visit with him, an office visit with your endocrinologist, your fasting cholesterol and diabetes labs, your annual diabetic foot exam, a urine test for your kidneys, colon cancer screening, and possibly a flu immunization    Last ov Hollie 2/2017, Corey 10/2016.

## 2018-01-04 NOTE — LETTER
January 15, 2018    Domi Venegascherie Leary  [address]  [place] [address]             [facility]  [address]  [facility] [address]  Phone: [phone] Dear Mrs. Leary:    We have tried to reach you by mychart unsuccessfully.    [facility] is committed to your overall health.  To help you get the most out of each of your visits, we will review your information to make sure you are up to date on all of your recommended tests and/or procedures.       We have Dr. Anoop Nicole listed as your primary care provider.  You have not seen him in the office since October of 2016.  If Dr. Nicole is no longer your primary care provider, please contact me so that we may update our records accordingly.       He has found that your chart shows you may be due for an office visit with him, an office visit with your endocrinologist, your fasting cholesterol and diabetes labs, your annual diabetic foot exam, a urine test for your kidneys, colon cancer screening, and possibly a flu immunization.     If you have had any of the above done at an outside facility, please let us know so I can update your record.  If you have a copy of these records, please provide a copy for us to scan into your chart.  If not, please provide that provider/facilities contact information so that we may obtain copies from that facility.     Otherwise, please schedule these appointments at your earliest convenience.      If you have any questions or concerns, please don't hesitate to call.    Thank you for letting us care for you,  Yoanna Merino LPN Clinical Care Coordinator  [facility]  [phone]

## 2018-01-22 DIAGNOSIS — E11.9 TYPE 2 DIABETES MELLITUS WITHOUT COMPLICATION, WITHOUT LONG-TERM CURRENT USE OF INSULIN: ICD-10-CM

## 2018-01-23 DIAGNOSIS — E11.40 TYPE 2 DIABETES MELLITUS WITH DIABETIC NEUROPATHY, WITHOUT LONG-TERM CURRENT USE OF INSULIN: Primary | ICD-10-CM

## 2018-01-23 DIAGNOSIS — E11.9 TYPE 2 DIABETES MELLITUS WITHOUT COMPLICATION, WITHOUT LONG-TERM CURRENT USE OF INSULIN: ICD-10-CM

## 2018-01-23 RX ORDER — CANAGLIFLOZIN 100 MG/1
TABLET, FILM COATED ORAL
Qty: 30 TABLET | Refills: 0 | Status: SHIPPED | OUTPATIENT
Start: 2018-01-23 | End: 2018-02-21 | Stop reason: SDUPTHER

## 2018-01-23 RX ORDER — METFORMIN HYDROCHLORIDE 500 MG/1
TABLET, EXTENDED RELEASE ORAL
Qty: 360 TABLET | Refills: 0 | Status: SHIPPED | OUTPATIENT
Start: 2018-01-23 | End: 2018-04-22 | Stop reason: SDUPTHER

## 2018-01-23 NOTE — TELEPHONE ENCOUNTER
Spoke with pt and informed her that refill on Metformin was refilled however she needs to get Genny's labs done and scheduled follow  Up with Christal or Dr. Nicole.     Pt scheduled apt with Dr. Nicole for 02/26/2018.

## 2018-01-25 ENCOUNTER — OFFICE VISIT (OUTPATIENT)
Dept: FAMILY MEDICINE | Facility: CLINIC | Age: 53
End: 2018-01-25
Payer: COMMERCIAL

## 2018-01-25 VITALS
DIASTOLIC BLOOD PRESSURE: 80 MMHG | WEIGHT: 196.13 LBS | HEIGHT: 63 IN | SYSTOLIC BLOOD PRESSURE: 152 MMHG | TEMPERATURE: 98 F | HEART RATE: 72 BPM | BODY MASS INDEX: 34.75 KG/M2

## 2018-01-25 DIAGNOSIS — E03.9 HYPOTHYROIDISM, UNSPECIFIED TYPE: ICD-10-CM

## 2018-01-25 DIAGNOSIS — E11.40 TYPE 2 DIABETES MELLITUS WITH DIABETIC NEUROPATHY, WITHOUT LONG-TERM CURRENT USE OF INSULIN: Primary | ICD-10-CM

## 2018-01-25 DIAGNOSIS — M05.79 SEROPOSITIVE RHEUMATOID ARTHRITIS OF MULTIPLE SITES: ICD-10-CM

## 2018-01-25 DIAGNOSIS — R60.1 GENERALIZED EDEMA: ICD-10-CM

## 2018-01-25 DIAGNOSIS — E66.9 OBESITY (BMI 30-39.9): ICD-10-CM

## 2018-01-25 PROCEDURE — 99999 PR PBB SHADOW E&M-EST. PATIENT-LVL III: CPT | Mod: PBBFAC,,, | Performed by: FAMILY MEDICINE

## 2018-01-25 PROCEDURE — 99214 OFFICE O/P EST MOD 30 MIN: CPT | Mod: S$GLB,,, | Performed by: FAMILY MEDICINE

## 2018-01-25 RX ORDER — TRAMADOL HYDROCHLORIDE 50 MG/1
50 TABLET ORAL EVERY 12 HOURS PRN
Qty: 60 TABLET | Refills: 0 | Status: SHIPPED | OUTPATIENT
Start: 2018-01-25 | End: 2018-05-09 | Stop reason: SDUPTHER

## 2018-01-25 RX ORDER — LEFLUNOMIDE 20 MG/1
TABLET ORAL
Qty: 90 TABLET | Refills: 0 | Status: SHIPPED | OUTPATIENT
Start: 2018-01-25 | End: 2018-05-16 | Stop reason: SDUPTHER

## 2018-01-26 NOTE — PROGRESS NOTES
"Subjective:       Patient ID: Domi Leary is a 52 y.o. female.    Chief Complaint: Edema; Facial Swelling; and Generalized Body Aches    She has a history of rheumatoid arthritis, psoriasis, difficult to control diabetes, hypothyroidism.  She presents with facial swelling.  She also feels like her body is puffy and skin is tight.  She ran out of Enbrel in December.  She is in the process of transferring her rheumatologic care to Dr. Chakraborty.  She is a little uncertain of her weight but thinks she has gained 10 pounds in the past month or so.  She is on 4 drugs for diabetes.  She has not checked her fingerstick blood sugars in a very long time.  We do not have any lab work on her since a year ago.  She has seen Genny but not recently.  No history of kidney or liver disease.  No blood in the urine.  She does not acknowledge any salty food.  She did not recently stopped a diuretic.  She says that her skin rash has improved since stopping Enbrel.      Review of Systems   Constitutional: Positive for unexpected weight change. Negative for appetite change and fever.   HENT:        Itchy watery eyes.   Respiratory: Negative for cough and shortness of breath.    Cardiovascular: Negative for chest pain, palpitations and leg swelling.   Gastrointestinal: Positive for abdominal distention. Negative for abdominal pain.   Genitourinary: Negative for dysuria and hematuria.   Musculoskeletal: Positive for back pain. Negative for joint swelling.       Objective:     Blood pressure (!) 152/80, pulse 72, temperature 97.8 °F (36.6 °C), height 5' 3" (1.6 m), weight 89 kg (196 lb 1.6 oz).      Physical Exam   Constitutional:   She is overweight and in mild distress.   HENT:   Nose is clear.  TMs are clear.  Throat is normal.  Periorbital edema.  Conjunctiva are noninflamed.  Sinuses are nontender.   Eyes: Pupils are equal, round, and reactive to light.   Neck: Neck supple. No thyromegaly present.   Cardiovascular: Normal rate, " regular rhythm and normal heart sounds.    No murmur heard.  Pulses:       Dorsalis pedis pulses are 1+ on the right side, and 1+ on the left side.        Posterior tibial pulses are 1+ on the right side, and 1+ on the left side.   Pulmonary/Chest: Effort normal and breath sounds normal. No respiratory distress.   Abdominal: Soft. Bowel sounds are normal. She exhibits distension. There is no tenderness.   Musculoskeletal: She exhibits no edema.        Right foot: There is no deformity.        Left foot: There is no deformity.   Feet:   Right Foot:   Protective Sensation: 4 sites tested. 4 sites sensed.   Skin Integrity: Negative for ulcer.   Left Foot:   Protective Sensation: 4 sites tested. 4 sites sensed.   Skin Integrity: Negative for ulcer.   Lymphadenopathy:     She has no cervical adenopathy.   Neurological: She is alert.       Assessment:       1. Type 2 diabetes mellitus with diabetic neuropathy, without long-term current use of insulin    2. Seropositive rheumatoid arthritis of multiple sites    3. Hypothyroidism, unspecified type    4. Obesity (BMI 30-39.9)    5. Generalized edema        Plan:       I refilled her Arava and tramadol.  Labwork ordered.  Follow-up in 2 weeks

## 2018-01-29 ENCOUNTER — HOSPITAL ENCOUNTER (OUTPATIENT)
Dept: RADIOLOGY | Facility: HOSPITAL | Age: 53
Discharge: HOME | End: 2018-01-29
Attending: NURSE PRACTITIONER
Payer: COMMERCIAL

## 2018-01-29 ENCOUNTER — OFFICE VISIT (OUTPATIENT)
Dept: PRIMARY CARE CLINIC | Facility: CLINIC | Age: 53
End: 2018-01-29
Payer: COMMERCIAL

## 2018-01-29 VITALS
HEART RATE: 80 BPM | DIASTOLIC BLOOD PRESSURE: 78 MMHG | SYSTOLIC BLOOD PRESSURE: 120 MMHG | WEIGHT: 196 LBS | OXYGEN SATURATION: 96 % | HEIGHT: 63 IN | BODY MASS INDEX: 34.73 KG/M2

## 2018-01-29 DIAGNOSIS — M79.672 ACUTE FOOT PAIN, LEFT: ICD-10-CM

## 2018-01-29 DIAGNOSIS — M79.672 ACUTE FOOT PAIN, LEFT: Primary | ICD-10-CM

## 2018-01-29 PROCEDURE — 73630 X-RAY EXAM OF FOOT: CPT | Mod: TC,FY,PO,LT

## 2018-01-29 PROCEDURE — 73630 X-RAY EXAM OF FOOT: CPT | Mod: 26,LT,, | Performed by: RADIOLOGY

## 2018-01-29 PROCEDURE — 99214 OFFICE O/P EST MOD 30 MIN: CPT | Mod: S$GLB,,, | Performed by: NURSE PRACTITIONER

## 2018-01-29 PROCEDURE — 99999 PR PBB SHADOW E&M-EST. PATIENT-LVL IV: CPT | Mod: PBBFAC,,, | Performed by: NURSE PRACTITIONER

## 2018-01-29 NOTE — Clinical Note
Anoop Nicole MD,  I saw your patient today in the [facility].  If you have any questions, please do not hesitate to contact me.  Thank you!  POLA Knutson

## 2018-01-30 ENCOUNTER — TELEPHONE (OUTPATIENT)
Dept: FAMILY MEDICINE | Facility: CLINIC | Age: 53
End: 2018-01-30

## 2018-01-30 ENCOUNTER — TELEPHONE (OUTPATIENT)
Dept: INTERNAL MEDICINE | Facility: CLINIC | Age: 53
End: 2018-01-30

## 2018-01-30 RX ORDER — INDOMETHACIN 50 MG/1
50 CAPSULE ORAL 3 TIMES DAILY
Qty: 21 CAPSULE | Refills: 0 | Status: SHIPPED | OUTPATIENT
Start: 2018-01-30 | End: 2018-02-08 | Stop reason: ALTCHOICE

## 2018-01-30 RX ORDER — LEVOTHYROXINE SODIUM 175 UG/1
TABLET ORAL
Qty: 90 TABLET | Refills: 0 | Status: SHIPPED | OUTPATIENT
Start: 2018-01-30 | End: 2018-04-27 | Stop reason: SDUPTHER

## 2018-01-30 NOTE — PROGRESS NOTES
"Domi Leary is a 52 y.o. female patient of Dr. Anoop Nicole MD who presents to the clinic today for        Chief Complaint   Patient presents with    Foot Swelling       left foot, walking in a parade saturday, no known injury, has RA.     Foot Pain   .     HPI      Pt, who is not known to me, reports marching in parade two days ago.  Left foot starting hurting while walking in parade.  So she alternated riding in the truck and walking.  Felt better after resting foot.  Pain was "on and off" throughout walking during parade.  Swelling is worse today and patient cannot walk on foot.  Pain worse on lateral side of post half of the foot and heel. No numbness.  Foot feels warm to touch, per .     These symptoms began two days ago and status is worsening.      Pt denies the following symptoms:  numbness, tingling, loss of sensation     Aggravating factors include movement.     Relieving factors include elevation of foot and ice helped minimally .     OTC Medications tried are none.     Prescription medications taken for symptoms are Ibuprofen 800mg, Tramadol.  Took one Hydrocodone today before leaving house for appointment because tramadol wasn't covering the pain.     Pertinent medical history:  Rheumatoid Arthritis     ROS     Constitutional:  No fever, No fatigue.     MS:  See Chief Complaint/HPI     All other ROS neg.        PAST MEDICAL HISTORY:       Past Medical History:   Diagnosis Date    Diabetes 2012    Hypothyroid 2012    Rheumatoid arthritis 2012         PAST SURGICAL HISTORY:        Past Surgical History:   Procedure Laterality Date     SECTION        COSMETIC SURGERY         breast augmentation    HERNIA REPAIR        TONSILLECTOMY             SOCIAL HISTORY:  Social History   Social History            Social History    Marital status:        Spouse name: N/A    Number of children: N/A    Years of education: N/A          Occupational History    Not " "on file.             Social History Main Topics    Smoking status: Former Smoker       Types: Cigarettes       Quit date: 2/20/2009    Smokeless tobacco: Never Used    Alcohol use Yes         Comment: occassionally    Drug use: No    Sexual activity: Yes           Other Topics Concern    Not on file          Social History Narrative    No narrative on file            FAMILY HISTORY:        Family History   Problem Relation Age of Onset    Diabetes Maternal Aunt      Breast cancer Maternal Aunt      Cataracts Paternal Grandmother           ALLERGIES AND MEDICATIONS: updated and reviewed.       Review of patient's allergies indicates:   Allergen Reactions    Amoxil [amoxicillin] Hives      Current Medications          Current Outpatient Prescriptions   Medication Sig Dispense Refill    blood sugar diagnostic (BLOOD GLUCOSE TEST) Strp 1 strip by Misc.(Non-Drug; Combo Route) route once daily. Accu check compact test strips. Check blood sugar daily. 100 strip 12    diclofenac (VOLTAREN) 75 MG EC tablet Take 1 tablet (75 mg total) by mouth 2 (two) times daily. 180 tablet 2    glimepiride (AMARYL) 4 MG tablet TAKE 2 TABLETS BY MOUTH EVERY  tablet 0    insulin needles, disposable, (ULTRA-THIN II INS PEN NEEDLES) 29 x 1/2 " Ndle Pen needles used for victoza pen 100 each 2    INVOKANA 100 mg Tab TAKE 1 TABLET(100 MG) BY MOUTH EVERY DAY 30 tablet 0    leflunomide (ARAVA) 20 MG Tab TAKE 1 TABLET(20 MG) BY MOUTH EVERY DAY 90 tablet 0    levothyroxine (SYNTHROID, LEVOTHROID) 175 MCG tablet Take 1 tablet (175 mcg total) by mouth once daily. 90 tablet 3    liraglutide 0.6 mg/0.1 mL, 18 mg/3 mL, subq PNIJ (VICTOZA 3-MOOSE) 0.6 mg/0.1 mL (18 mg/3 mL) PnIj Inject 1.8 mg into the skin once daily. 27 mL 0    metFORMIN (GLUCOPHAGE-XR) 500 MG 24 hr tablet TAKE 2 TABLETS(1000 MG) BY MOUTH TWICE DAILY WITH MEALS 360 tablet 0    pravastatin (PRAVACHOL) 10 MG tablet Take 1 tablet (10 mg total) by mouth once daily. 30 " tablet 4    traMADol (ULTRAM) 50 mg tablet Take 1 tablet (50 mg total) by mouth every 12 (twelve) hours as needed. 60 tablet 0    triamcinolone acetonide 0.1% (KENALOG) 0.1 % ointment Apply topically 2 (two) times daily. 453.6 g 1      No current facility-administered medications for this visit.                PHYSICAL EXAM     Alert, coop 52 y.o. female patient in no acute distress, in wheelchair and accompanied by .  Friendly and easily engaged.         Vitals:     01/29/18 1811   BP: 120/78   Pulse: 80      VS reviewed.  VS WNL.  CC, nursing note, medications & PMH all reviewed today.     Head:  Normocephalic, atraumatic.     EENT: Ext nose/ears normal.  Eyes with normal lids, not injected.                Resp:  Respirations unlabored without retractions.     Heart:  Normal rate.  CV:  Cap RF brisk, 2+ post tib pulses bilat in lower extremities.       MS:  Mild swelling noted to left foot and ankle lat foot in the area of the prox 5th metatarsal and talus.  No obvious deformities noted.  No loss of sensation noted to toes or foot. Strength 5/5 with extension and flexion. Full ROM intact. Discomfort noted with external rotation of foot.   Tenderness noted with palpation of lateral side of foot near heel and back of foot near heel.            NEURO:  Alert and oriented x 4.  Responds appropriately during interaction.                  Sensation to light touch intact over plantar surface of foot and all toes.                       Skin:  Warm, dry, color good.     Psych:  Responds appropriately throughout the visit.               Relaxed.  Well-groomed.    Acute foot pain, left  -     X-Ray Foot Complete Left; Future; Expected date: 01/29/2018      Pt today presents with c/o left foot pain and swelling that started while she was walking in a [employer] parade 2 days ago.  Since then the pain has increased and there is some swelling present. Resting, ice, ibuprofen and tramadol are no longer helping with the  pain.  On exam there's tenderness and mild edema in the area of the prox 5th metatarsal on the lat aspect and on the lat aspect of talus.  She is not walking on it today r/t pain.    This is a new problem to me and the following work up is planned.    Lab & Radiological Tests Ordered: xray of the left foot.  The results are no acute changes..  The xray was read by radiologist.      Pt advised to perform comfort measures recommended on patient instruction sheet .    If not better in 7 days, the patient is advised to f/u with PCO.  If worse or concerns, the patient is advised to call us.  Note sent to PCP office that the pt is requesting hydrocodone for pain control.  Explained exam findings, diagnosis and treatment plan to patient and her .  Questions answered and patient states understanding.

## 2018-01-30 NOTE — TELEPHONE ENCOUNTER
Notified pt of xray results per Mary Jo Freeman's instructions. Pt voiced understanding for all instructions. CLC

## 2018-01-30 NOTE — PATIENT INSTRUCTIONS
Your exam and symptoms today are consistent with left foot pain    Use Ibuprofen tablets for inflammation  and pain.    Left foot xray ordered .    Comfort measures:    Muscle/joint rubs like Biofreeze, Ace Pearce, Aspercreme, Salonpas or generic.  Ask the pharmacist for assistance in choosing the generic equivalent at that store.    Ice or heat to the area.    Activity as tolerated.    You can reach us at [phone] Monday through Thursday (except holidays) 10 a.m. To 6 p.m.    Thank you for using the [facility]!    Radha Freeman, APRN, CNP, FNP-BC  [facility]

## 2018-01-30 NOTE — TELEPHONE ENCOUNTER
----- Message from Mary Jo Freeman NP sent at 1/29/2018  7:40 PM CST -----  No fracture or joint abnormality is noted in the affected area.  There are changes in the toes and at the base of the right great toe but these are not as a result of the current problem.  I advise her to f/u in 1 week with Dr. Nicole or Berenice Goldsmtih NP in [place], if sxs do not improve over the next week or get worse.  Thank you!

## 2018-01-30 NOTE — TELEPHONE ENCOUNTER
Spoke w/ pt and advised as recommended. Appt made for tomorrow per pt request as she will be here for lab. Appt w/ NP Kia at 820am. Pt agreed.

## 2018-01-30 NOTE — TELEPHONE ENCOUNTER
Pt went to  on 1/29/2018. Pt rated pain a 10/10 w/ relief upon elevation. Pt declined any injury to area. Confirmed pharmacy and allergies.

## 2018-01-30 NOTE — TELEPHONE ENCOUNTER
----- Message from Renettaaishwarya Santillan sent at 1/30/2018 11:36 AM CST -----  Patient states that she need a medication for pain in foot.  Please send into [facility].  Any questions call [phone].

## 2018-01-30 NOTE — TELEPHONE ENCOUNTER
Mary Jo Freeman, [facility] Staff             Pt hurt her foot on Saturday 1/26/18 and the tramadol isn't strong enough to cover the pain.  Please ask Dr. Nicole about a stronger pain medicine.  If he's not in, please consult one of the other colleagues.   Thank you!

## 2018-01-30 NOTE — MEDICAL/APP STUDENT
"Domi Leary is a 52 y.o. female patient of Dr. Anoop Nicole MD who presents to the clinic today for   Chief Complaint   Patient presents with    Foot Swelling     left foot, walking in a parade saturday, no known injury, has RA.     Foot Pain   .    HPI      Pt, who is not known to me, reports marching in parade two days ago.  Left foot starting hurting while walking in parade.  Felt better after resting foot.  Pain was "on and off" throughout walking during parade.   Swelling is worse today and patient cannot walk on foot.  Pain worse on lateral side of foot and heel. No numbness, foot is warm to touch.    These symptoms began two days ago and status is ongoing.     Pt denies the following symptoms:  numbness, tingling, loss of sensation    Aggravating factors include movement.    Relieving factors include elevation of foot and ice helped minimally .    OTC Medications tried are none.    Prescription medications taken for symptoms are Ibuprofen 800mg, Tramadol.  Took one Hydrocodone today before leaving house for appointment.    Pertinent medical history:  Rheumatoid Arthritis    ROS    Constitutional:  No fever, No fatigue.    HEENT:  Negative    Heart/Lung:  No new sxs    GI/:  No new sxs.    MS:  See Chief Complaint/HPI    Skin:  No rashes, itching..      PAST MEDICAL HISTORY:  Past Medical History:   Diagnosis Date    Diabetes 2012    Hypothyroid 2012    Rheumatoid arthritis 2012       PAST SURGICAL HISTORY:  Past Surgical History:   Procedure Laterality Date     SECTION      COSMETIC SURGERY      breast augmentation    HERNIA REPAIR      TONSILLECTOMY         SOCIAL HISTORY:  Social History     Social History    Marital status:      Spouse name: N/A    Number of children: N/A    Years of education: N/A     Occupational History    Not on file.     Social History Main Topics    Smoking status: Former Smoker     Types: Cigarettes     Quit date: 2009    " "Smokeless tobacco: Never Used    Alcohol use Yes      Comment: occassionally    Drug use: No    Sexual activity: Yes     Other Topics Concern    Not on file     Social History Narrative    No narrative on file       FAMILY HISTORY:  Family History   Problem Relation Age of Onset    Diabetes Maternal Aunt     Breast cancer Maternal Aunt     Cataracts Paternal Grandmother        ALLERGIES AND MEDICATIONS: updated and reviewed.  Review of patient's allergies indicates:   Allergen Reactions    Amoxil [amoxicillin] Hives     Current Outpatient Prescriptions   Medication Sig Dispense Refill    blood sugar diagnostic (BLOOD GLUCOSE TEST) Strp 1 strip by Misc.(Non-Drug; Combo Route) route once daily. Accu check compact test strips. Check blood sugar daily. 100 strip 12    diclofenac (VOLTAREN) 75 MG EC tablet Take 1 tablet (75 mg total) by mouth 2 (two) times daily. 180 tablet 2    glimepiride (AMARYL) 4 MG tablet TAKE 2 TABLETS BY MOUTH EVERY  tablet 0    insulin needles, disposable, (ULTRA-THIN II INS PEN NEEDLES) 29 x 1/2 " Ndle Pen needles used for victoza pen 100 each 2    INVOKANA 100 mg Tab TAKE 1 TABLET(100 MG) BY MOUTH EVERY DAY 30 tablet 0    leflunomide (ARAVA) 20 MG Tab TAKE 1 TABLET(20 MG) BY MOUTH EVERY DAY 90 tablet 0    levothyroxine (SYNTHROID, LEVOTHROID) 175 MCG tablet Take 1 tablet (175 mcg total) by mouth once daily. 90 tablet 3    liraglutide 0.6 mg/0.1 mL, 18 mg/3 mL, subq PNIJ (VICTOZA 3-MOOSE) 0.6 mg/0.1 mL (18 mg/3 mL) PnIj Inject 1.8 mg into the skin once daily. 27 mL 0    metFORMIN (GLUCOPHAGE-XR) 500 MG 24 hr tablet TAKE 2 TABLETS(1000 MG) BY MOUTH TWICE DAILY WITH MEALS 360 tablet 0    pravastatin (PRAVACHOL) 10 MG tablet Take 1 tablet (10 mg total) by mouth once daily. 30 tablet 4    traMADol (ULTRAM) 50 mg tablet Take 1 tablet (50 mg total) by mouth every 12 (twelve) hours as needed. 60 tablet 0    triamcinolone acetonide 0.1% (KENALOG) 0.1 % ointment Apply topically " 2 (two) times daily. 453.6 g 1     No current facility-administered medications for this visit.          PHYSICAL EXAM    Alert, coop 52 y.o. female patient in no acute distress, in wheelchair and accompanied by .  Friendly and easily engaged.    Vitals:    01/29/18 1811   BP: 120/78   Pulse: 80     VS reviewed.  VS WNL.  CC, nursing note, medications & PMH all reviewed today.    Head:  Normocephalic, atraumatic.    EENT: Ext nose/ears normal.  Eyes with normal lids, not injected.           Resp:  Respirations unlabored without retractions.    Heart:  Normal rate.  CV:  Cap RF brisk, 2+ pulses + bilat in lower extremities.      MS:  Mild swelling noted to left foot and ankle.  No obvious deformities noted.  No loss of sensation noted to toes or foot. Strength 5/5. ROM present. Discomfort noted with internal rotation of foot.   Tenderness noted with palpation of lateral side of foot near heel and back of foot near heel.            NEURO:  Alert and oriented x 4.  Responds appropriately during interaction.                  Sensation to light touch intact over plantar surface of foot and all toes.                      Skin:  Warm, dry, color good.    Psych:  Responds appropriately throughout the visit.               Relaxed.  Well-groomed.

## 2018-01-31 ENCOUNTER — LAB VISIT (OUTPATIENT)
Dept: LAB | Facility: HOSPITAL | Age: 53
End: 2018-01-31
Attending: FAMILY MEDICINE
Payer: COMMERCIAL

## 2018-01-31 ENCOUNTER — OFFICE VISIT (OUTPATIENT)
Dept: FAMILY MEDICINE | Facility: CLINIC | Age: 53
End: 2018-01-31
Payer: COMMERCIAL

## 2018-01-31 VITALS
TEMPERATURE: 98 F | HEART RATE: 89 BPM | SYSTOLIC BLOOD PRESSURE: 150 MMHG | BODY MASS INDEX: 34.38 KG/M2 | WEIGHT: 194 LBS | DIASTOLIC BLOOD PRESSURE: 90 MMHG | OXYGEN SATURATION: 98 % | HEIGHT: 63 IN

## 2018-01-31 DIAGNOSIS — M05.79 SEROPOSITIVE RHEUMATOID ARTHRITIS OF MULTIPLE SITES: ICD-10-CM

## 2018-01-31 DIAGNOSIS — E11.40 TYPE 2 DIABETES MELLITUS WITH DIABETIC NEUROPATHY, WITHOUT LONG-TERM CURRENT USE OF INSULIN: ICD-10-CM

## 2018-01-31 DIAGNOSIS — M79.672 LEFT FOOT PAIN: Primary | ICD-10-CM

## 2018-01-31 DIAGNOSIS — R60.1 GENERALIZED EDEMA: ICD-10-CM

## 2018-01-31 LAB
ALBUMIN SERPL BCP-MCNC: 3.7 G/DL
ALP SERPL-CCNC: 85 U/L
ALT SERPL W/O P-5'-P-CCNC: 34 U/L
ANION GAP SERPL CALC-SCNC: 14 MMOL/L
AST SERPL-CCNC: 59 U/L
BILIRUB SERPL-MCNC: 0.5 MG/DL
BUN SERPL-MCNC: 16 MG/DL
CALCIUM SERPL-MCNC: 10.1 MG/DL
CHLORIDE SERPL-SCNC: 100 MMOL/L
CHOLEST SERPL-MCNC: 250 MG/DL
CHOLEST/HDLC SERPL: 3 {RATIO}
CO2 SERPL-SCNC: 25 MMOL/L
CREAT SERPL-MCNC: 1 MG/DL
CRP SERPL-MCNC: 12.8 MG/L
ERYTHROCYTE [DISTWIDTH] IN BLOOD BY AUTOMATED COUNT: 16 %
ERYTHROCYTE [SEDIMENTATION RATE] IN BLOOD BY WESTERGREN METHOD: 12 MM/HR
EST. GFR  (AFRICAN AMERICAN): >60 ML/MIN/1.73 M^2
EST. GFR  (NON AFRICAN AMERICAN): >60 ML/MIN/1.73 M^2
ESTIMATED AVG GLUCOSE: 148 MG/DL
GLUCOSE SERPL-MCNC: 143 MG/DL
HBA1C MFR BLD HPLC: 6.8 %
HCT VFR BLD AUTO: 45.9 %
HDLC SERPL-MCNC: 83 MG/DL
HDLC SERPL: 33.2 %
HGB BLD-MCNC: 14.1 G/DL
LDLC SERPL CALC-MCNC: 128.2 MG/DL
MCH RBC QN AUTO: 29.6 PG
MCHC RBC AUTO-ENTMCNC: 30.7 G/DL
MCV RBC AUTO: 96 FL
NONHDLC SERPL-MCNC: 167 MG/DL
PLATELET # BLD AUTO: 451 K/UL
PMV BLD AUTO: 11.3 FL
POTASSIUM SERPL-SCNC: 3.8 MMOL/L
PROT SERPL-MCNC: 8.2 G/DL
RBC # BLD AUTO: 4.77 M/UL
SODIUM SERPL-SCNC: 139 MMOL/L
TRIGL SERPL-MCNC: 194 MG/DL
WBC # BLD AUTO: 10.72 K/UL

## 2018-01-31 PROCEDURE — 85651 RBC SED RATE NONAUTOMATED: CPT

## 2018-01-31 PROCEDURE — 83036 HEMOGLOBIN GLYCOSYLATED A1C: CPT

## 2018-01-31 PROCEDURE — 36415 COLL VENOUS BLD VENIPUNCTURE: CPT | Mod: PN

## 2018-01-31 PROCEDURE — 80053 COMPREHEN METABOLIC PANEL: CPT

## 2018-01-31 PROCEDURE — 99213 OFFICE O/P EST LOW 20 MIN: CPT | Mod: S$GLB,,, | Performed by: NURSE PRACTITIONER

## 2018-01-31 PROCEDURE — 86140 C-REACTIVE PROTEIN: CPT

## 2018-01-31 PROCEDURE — 80061 LIPID PANEL: CPT

## 2018-01-31 PROCEDURE — 3008F BODY MASS INDEX DOCD: CPT | Mod: S$GLB,,, | Performed by: NURSE PRACTITIONER

## 2018-01-31 PROCEDURE — 99999 PR PBB SHADOW E&M-EST. PATIENT-LVL IV: CPT | Mod: PBBFAC,,, | Performed by: NURSE PRACTITIONER

## 2018-01-31 PROCEDURE — 85027 COMPLETE CBC AUTOMATED: CPT

## 2018-01-31 NOTE — PROGRESS NOTES
Subjective:       Patient ID: Domi Leary is a 52 y.o. female.    Chief Complaint: Pain (left foot)    HPI     Pt presents to clinic for f/u of left foot pain.   She was evaluated at [facility] on 01/29/2018 for sx, xray was negative.   Sx have slightly improved today. She has been elevating the LLE and trying to rest.   She has not started the indomethacin, which was sent to her pharmacy yesterday.     She, also, is scheduled for labs today and f/u with Dr. Nicole for generalized edema. Denies CP, SOB.     Review of Systems   Constitutional: Negative for chills and fever.   Musculoskeletal: Positive for gait problem. Negative for arthralgias and joint swelling.   Skin: Negative for color change, rash and wound.   Neurological: Negative for weakness and numbness.       Objective:      Physical Exam   Constitutional: She is oriented to person, place, and time. She appears well-developed and well-nourished.   HENT:   Head: Normocephalic and atraumatic.   Cardiovascular: Normal rate, regular rhythm and normal heart sounds.    No murmur heard.  Pulmonary/Chest: Effort normal and breath sounds normal. No respiratory distress. She has no wheezes. She has no rales.   Musculoskeletal: Normal range of motion. She exhibits no edema, tenderness or deformity.        Feet:    Neurological: She is alert and oriented to person, place, and time. No cranial nerve deficit.   Skin: Skin is warm and dry.   Psychiatric: She has a normal mood and affect. Her behavior is normal.   Nursing note and vitals reviewed.      Assessment:       1. Left foot pain    2. Generalized edema        Plan:   Domi was seen today for pain.    Diagnoses and all orders for this visit:    Left foot pain  Continue RICE therapy. May benefit from using a boot to assist with mobility.   Consider podiatry if sx worsen of fail to improve.     Generalized edema  Continue to update labs anf follow-up in clinic as previously scheduled.

## 2018-02-08 ENCOUNTER — OFFICE VISIT (OUTPATIENT)
Dept: FAMILY MEDICINE | Facility: CLINIC | Age: 53
End: 2018-02-08
Payer: COMMERCIAL

## 2018-02-08 ENCOUNTER — LAB VISIT (OUTPATIENT)
Dept: LAB | Facility: HOSPITAL | Age: 53
End: 2018-02-08
Attending: FAMILY MEDICINE
Payer: COMMERCIAL

## 2018-02-08 VITALS
HEART RATE: 70 BPM | HEIGHT: 63 IN | DIASTOLIC BLOOD PRESSURE: 78 MMHG | BODY MASS INDEX: 34.45 KG/M2 | WEIGHT: 194.44 LBS | SYSTOLIC BLOOD PRESSURE: 118 MMHG | TEMPERATURE: 98 F

## 2018-02-08 DIAGNOSIS — E11.40 TYPE 2 DIABETES MELLITUS WITH DIABETIC NEUROPATHY, WITHOUT LONG-TERM CURRENT USE OF INSULIN: ICD-10-CM

## 2018-02-08 DIAGNOSIS — R60.9 EDEMA, UNSPECIFIED TYPE: Primary | ICD-10-CM

## 2018-02-08 DIAGNOSIS — E03.4 HYPOTHYROIDISM DUE TO ACQUIRED ATROPHY OF THYROID: ICD-10-CM

## 2018-02-08 DIAGNOSIS — M05.79 SEROPOSITIVE RHEUMATOID ARTHRITIS OF MULTIPLE SITES: ICD-10-CM

## 2018-02-08 DIAGNOSIS — L30.9 DERMATITIS: ICD-10-CM

## 2018-02-08 LAB
T4 FREE SERPL-MCNC: 0.68 NG/DL
TSH SERPL DL<=0.005 MIU/L-ACNC: 84.18 UIU/ML

## 2018-02-08 PROCEDURE — 99999 PR PBB SHADOW E&M-EST. PATIENT-LVL III: CPT | Mod: PBBFAC,,, | Performed by: FAMILY MEDICINE

## 2018-02-08 PROCEDURE — 99213 OFFICE O/P EST LOW 20 MIN: CPT | Mod: S$GLB,,, | Performed by: FAMILY MEDICINE

## 2018-02-08 PROCEDURE — 3008F BODY MASS INDEX DOCD: CPT | Mod: S$GLB,,, | Performed by: FAMILY MEDICINE

## 2018-02-08 PROCEDURE — 99213 OFFICE O/P EST LOW 20 MIN: CPT | Mod: PN | Performed by: FAMILY MEDICINE

## 2018-02-08 PROCEDURE — 36415 COLL VENOUS BLD VENIPUNCTURE: CPT | Mod: PN

## 2018-02-08 PROCEDURE — 84439 ASSAY OF FREE THYROXINE: CPT

## 2018-02-08 PROCEDURE — 84443 ASSAY THYROID STIM HORMONE: CPT

## 2018-02-08 RX ORDER — HYDROCHLOROTHIAZIDE 25 MG/1
25 TABLET ORAL DAILY
Qty: 30 TABLET | Refills: 0 | Status: SHIPPED | OUTPATIENT
Start: 2018-02-08 | End: 2018-03-12 | Stop reason: SDUPTHER

## 2018-02-08 NOTE — PROGRESS NOTES
"Subjective:       Patient ID: Domi Leary is a 52 y.o. female.    Chief Complaint: Follow-up (3 mo f/u. Lab done)    I saw her for swelling.  Lab work was remarkable for a mild elevation of CRP to 12.8.  She does complain of hand swelling and difficulty putting on her rings.  She still has periorbital swelling.  She had an episode of severe left lateral foot pain after walking fast in a parade.  She was seen in urgent care and also by Berenice.  Her pain was severe for for 5 days but is doing well now.  She has been off Enbrel since late December.  She thinks that her rash has improved.  The rash dates back at least 2 years.  She saw dermatology in 2016.  She has a rheumatology appointment in April.      Review of Systems   Constitutional: Negative for fever and unexpected weight change.   Musculoskeletal: Positive for arthralgias.       Objective:     Blood pressure 118/78, pulse 70, temperature 97.9 °F (36.6 °C), temperature source Oral, height 5' 3" (1.6 m), weight 88.2 kg (194 lb 7.1 oz).      Physical Exam   Constitutional:   Overweight no distress   HENT:   Some periorbital edema.   Cardiovascular: Normal rate and regular rhythm.    Pulmonary/Chest: Effort normal and breath sounds normal.   Musculoskeletal:   She has some swelling of the right second and third MCPs.  They are nontender.  Good .  Foot exam was normal.  Good range of motion and no tender areas.  Normal gait.  Able to stand on tiptoes.       Assessment:       1. Edema, unspecified type    2. Type 2 diabetes mellitus with diabetic neuropathy, without long-term current use of insulin    3. Seropositive rheumatoid arthritis of multiple sites    4. Dermatitis    5. Hypothyroidism due to acquired atrophy of thyroid        Plan:       Trial of HCTZ 25 mg daily to help control edema.  Continue with triamcinolone ointment for dermatitis.  Consider repeat dermatology appointment    "

## 2018-02-12 ENCOUNTER — PATIENT OUTREACH (OUTPATIENT)
Dept: ADMINISTRATIVE | Facility: HOSPITAL | Age: 53
End: 2018-02-12

## 2018-02-12 NOTE — LETTER
February 20, 2018    Domi Olson Gibran  [address]  [place] [address]             [facility]  [address]  [facility] [address]  Phone: [phone] Dear Mrs. Leary:    We have tried to reach you by mychart unsuccessfully.      [facility] is committed to your overall health.  To help you get the most out of each of your visits, we will review your information to make sure you are up to date on all of your recommended tests and or procedures.       Dr. Anoop Nicole MD has found that you may be due for:     Colon cancer screening   Annual diabetic eye exam     If you have not had an eye exam in the past year, we now have a  Retinal Camera at the [facility].  If we do this exam the same day you see a member of your Primary Care team, we can save you from having to pay a second co pay.      If you have had any of the above done at another facility, please bring the records or information with you so that your record at [facility] will be complete and up to date.     If you have not had any of these tests or procedures done recently and would like to complete this testing before your appointment on 2/26/18 at 9:00 am with Anoop Nicole MD please call [phone] or send a message through your [facility] portal to your provider's office.     If you are currently taking medication, please bring it with you to your appointment for review.         Please feel free to call the number listed below if you have any questions.     Thanks,     Oj Cortes LPN Clinical Care Coordinator   [facility] [address] [place] [address]   [phone] (p)   [phone] (f)      Additional Information   If you have questions, you can email [email or web address] or call [phone]  to talk to our [facility] staff. Remember, [facility] is NOT to be used for urgent needs. For medical emergencies, dial 911.

## 2018-02-19 ENCOUNTER — TELEPHONE (OUTPATIENT)
Dept: ADMINISTRATIVE | Facility: HOSPITAL | Age: 53
End: 2018-02-19

## 2018-02-19 NOTE — TELEPHONE ENCOUNTER
No lab due prior to upcoming visit. No recent ER visits. Monitors BS at home, averaging <150. A1C in Jan 6.8. Compliant w/meds, does have diarrhea w/metformin. Reports she is complient w/diet. No routine exercise, coached on benefits, provided w/gym discount info. Exercise would be limited at this time d/t rheumatoid arthritis. Jasmyn recently dc d/t psoriasis outbreak. Scheduled w/rheumatology in April for alternative med to Enbrel. UTD on mammogram/colonoscopy/eye exam. Does need flu vaccine (2/15)

## 2018-02-21 DIAGNOSIS — E11.9 TYPE 2 DIABETES MELLITUS WITHOUT COMPLICATION, WITHOUT LONG-TERM CURRENT USE OF INSULIN: ICD-10-CM

## 2018-02-22 RX ORDER — CANAGLIFLOZIN 100 MG/1
TABLET, FILM COATED ORAL
Qty: 30 TABLET | Refills: 0 | Status: SHIPPED | OUTPATIENT
Start: 2018-02-22 | End: 2018-05-09 | Stop reason: SDUPTHER

## 2018-02-23 DIAGNOSIS — Z13.5 DIABETIC RETINOPATHY SCREENING: ICD-10-CM

## 2018-03-12 RX ORDER — HYDROCHLOROTHIAZIDE 25 MG/1
25 TABLET ORAL DAILY
Qty: 90 TABLET | Refills: 3 | Status: SHIPPED | OUTPATIENT
Start: 2018-03-12 | End: 2018-08-22

## 2018-03-28 DIAGNOSIS — E11.9 TYPE 2 DIABETES MELLITUS WITHOUT COMPLICATION, WITHOUT LONG-TERM CURRENT USE OF INSULIN: ICD-10-CM

## 2018-03-29 ENCOUNTER — TELEPHONE (OUTPATIENT)
Dept: RHEUMATOLOGY | Facility: CLINIC | Age: 53
End: 2018-03-29

## 2018-03-29 NOTE — TELEPHONE ENCOUNTER
LM for patient that her appointment has been rescheduled due to the doctor's Easter vacation. Patient is on wait list and booked for 8-8-18@11 am. BRIGIDO

## 2018-04-22 DIAGNOSIS — E11.9 TYPE 2 DIABETES MELLITUS WITHOUT COMPLICATION, WITHOUT LONG-TERM CURRENT USE OF INSULIN: ICD-10-CM

## 2018-04-22 RX ORDER — DICLOFENAC SODIUM 75 MG/1
TABLET, DELAYED RELEASE ORAL
Qty: 180 TABLET | Refills: 0 | Status: SHIPPED | OUTPATIENT
Start: 2018-04-22 | End: 2018-08-08

## 2018-04-23 RX ORDER — METFORMIN HYDROCHLORIDE 500 MG/1
TABLET, EXTENDED RELEASE ORAL
Qty: 360 TABLET | Refills: 3 | Status: SHIPPED | OUTPATIENT
Start: 2018-04-23 | End: 2018-08-22

## 2018-04-28 RX ORDER — LEVOTHYROXINE SODIUM 175 UG/1
TABLET ORAL
Qty: 90 TABLET | Refills: 3 | Status: SHIPPED | OUTPATIENT
Start: 2018-04-28 | End: 2019-08-12 | Stop reason: SDUPTHER

## 2018-05-09 ENCOUNTER — TELEPHONE (OUTPATIENT)
Dept: FAMILY MEDICINE | Facility: CLINIC | Age: 53
End: 2018-05-09

## 2018-05-09 DIAGNOSIS — E11.9 TYPE 2 DIABETES MELLITUS WITHOUT COMPLICATION, WITHOUT LONG-TERM CURRENT USE OF INSULIN: ICD-10-CM

## 2018-05-09 DIAGNOSIS — E11.40 TYPE 2 DIABETES MELLITUS WITH DIABETIC NEUROPATHY, WITHOUT LONG-TERM CURRENT USE OF INSULIN: ICD-10-CM

## 2018-05-09 RX ORDER — PRAVASTATIN SODIUM 10 MG/1
TABLET ORAL
Qty: 30 TABLET | Refills: 0 | Status: SHIPPED | OUTPATIENT
Start: 2018-05-09 | End: 2018-06-08 | Stop reason: SDUPTHER

## 2018-05-09 RX ORDER — CANAGLIFLOZIN 100 MG/1
TABLET, FILM COATED ORAL
Qty: 30 TABLET | Refills: 0 | Status: SHIPPED | OUTPATIENT
Start: 2018-05-09 | End: 2018-06-08 | Stop reason: SDUPTHER

## 2018-05-09 RX ORDER — LIRAGLUTIDE 6 MG/ML
1.8 INJECTION SUBCUTANEOUS DAILY
Qty: 27 ML | Refills: 0 | Status: SHIPPED | OUTPATIENT
Start: 2018-05-09 | End: 2018-08-07 | Stop reason: SDUPTHER

## 2018-05-09 RX ORDER — TRAMADOL HYDROCHLORIDE 50 MG/1
TABLET ORAL
Qty: 60 TABLET | Refills: 0 | Status: SHIPPED | OUTPATIENT
Start: 2018-05-09 | End: 2019-05-14 | Stop reason: SDUPTHER

## 2018-05-16 RX ORDER — LEFLUNOMIDE 20 MG/1
TABLET ORAL
Qty: 90 TABLET | Refills: 0 | Status: SHIPPED | OUTPATIENT
Start: 2018-05-16 | End: 2018-08-01 | Stop reason: SDUPTHER

## 2018-05-16 NOTE — TELEPHONE ENCOUNTER
Rec'd fax from [facility] Requesting refill on Leflunomide. Should we route this request to pt's Rheumatologist?

## 2018-06-08 DIAGNOSIS — E11.9 TYPE 2 DIABETES MELLITUS WITHOUT COMPLICATION, WITHOUT LONG-TERM CURRENT USE OF INSULIN: ICD-10-CM

## 2018-06-08 DIAGNOSIS — E11.40 TYPE 2 DIABETES MELLITUS WITH DIABETIC NEUROPATHY, WITHOUT LONG-TERM CURRENT USE OF INSULIN: ICD-10-CM

## 2018-06-08 RX ORDER — PRAVASTATIN SODIUM 10 MG/1
TABLET ORAL
Qty: 30 TABLET | Refills: 3 | Status: SHIPPED | OUTPATIENT
Start: 2018-06-08 | End: 2018-08-22 | Stop reason: SDUPTHER

## 2018-06-08 RX ORDER — CANAGLIFLOZIN 100 MG/1
TABLET, FILM COATED ORAL
Qty: 30 TABLET | Refills: 3 | Status: SHIPPED | OUTPATIENT
Start: 2018-06-08 | End: 2018-08-22 | Stop reason: SDUPTHER

## 2018-07-22 RX ORDER — DICLOFENAC SODIUM 75 MG/1
TABLET, DELAYED RELEASE ORAL
Qty: 60 TABLET | Refills: 0 | Status: SHIPPED | OUTPATIENT
Start: 2018-07-22 | End: 2018-08-08

## 2018-08-01 ENCOUNTER — DOCUMENTATION ONLY (OUTPATIENT)
Dept: FAMILY MEDICINE | Facility: CLINIC | Age: 53
End: 2018-08-01

## 2018-08-01 RX ORDER — LEFLUNOMIDE 20 MG/1
TABLET ORAL
Qty: 90 TABLET | Refills: 0 | Status: SHIPPED | OUTPATIENT
Start: 2018-08-01 | End: 2018-08-08 | Stop reason: SDUPTHER

## 2018-08-07 DIAGNOSIS — E11.40 TYPE 2 DIABETES MELLITUS WITH DIABETIC NEUROPATHY, WITHOUT LONG-TERM CURRENT USE OF INSULIN: Primary | ICD-10-CM

## 2018-08-07 DIAGNOSIS — E11.9 TYPE 2 DIABETES MELLITUS WITHOUT COMPLICATION, WITHOUT LONG-TERM CURRENT USE OF INSULIN: ICD-10-CM

## 2018-08-07 RX ORDER — LIRAGLUTIDE 6 MG/ML
1.8 INJECTION SUBCUTANEOUS DAILY
Qty: 27 ML | Refills: 0 | Status: SHIPPED | OUTPATIENT
Start: 2018-08-07 | End: 2018-11-04 | Stop reason: SDUPTHER

## 2018-08-08 ENCOUNTER — HOSPITAL ENCOUNTER (OUTPATIENT)
Dept: RADIOLOGY | Facility: HOSPITAL | Age: 53
Discharge: HOME | End: 2018-08-08
Attending: INTERNAL MEDICINE
Payer: COMMERCIAL

## 2018-08-08 ENCOUNTER — INITIAL CONSULT (OUTPATIENT)
Dept: RHEUMATOLOGY | Facility: CLINIC | Age: 53
End: 2018-08-08
Payer: COMMERCIAL

## 2018-08-08 VITALS
HEIGHT: 63 IN | HEART RATE: 63 BPM | BODY MASS INDEX: 32.66 KG/M2 | SYSTOLIC BLOOD PRESSURE: 142 MMHG | DIASTOLIC BLOOD PRESSURE: 75 MMHG | WEIGHT: 184.31 LBS

## 2018-08-08 DIAGNOSIS — M05.79 SEROPOSITIVE RHEUMATOID ARTHRITIS OF MULTIPLE SITES: Primary | ICD-10-CM

## 2018-08-08 DIAGNOSIS — E11.40 TYPE 2 DIABETES MELLITUS WITH DIABETIC NEUROPATHY, WITHOUT LONG-TERM CURRENT USE OF INSULIN: ICD-10-CM

## 2018-08-08 DIAGNOSIS — M05.79 SEROPOSITIVE RHEUMATOID ARTHRITIS OF MULTIPLE SITES: ICD-10-CM

## 2018-08-08 PROCEDURE — 3044F HG A1C LEVEL LT 7.0%: CPT | Mod: CPTII,S$GLB,, | Performed by: INTERNAL MEDICINE

## 2018-08-08 PROCEDURE — 99215 OFFICE O/P EST HI 40 MIN: CPT | Mod: S$GLB,,, | Performed by: INTERNAL MEDICINE

## 2018-08-08 PROCEDURE — 73130 X-RAY EXAM OF HAND: CPT | Mod: 50,TC,FY,PO

## 2018-08-08 PROCEDURE — 3008F BODY MASS INDEX DOCD: CPT | Mod: CPTII,S$GLB,, | Performed by: INTERNAL MEDICINE

## 2018-08-08 PROCEDURE — 73130 X-RAY EXAM OF HAND: CPT | Mod: 26,50,, | Performed by: RADIOLOGY

## 2018-08-08 PROCEDURE — 99999 PR PBB SHADOW E&M-EST. PATIENT-LVL III: CPT | Mod: PBBFAC,,, | Performed by: INTERNAL MEDICINE

## 2018-08-08 RX ORDER — HYDROXYCHLOROQUINE SULFATE 200 MG/1
200 TABLET, FILM COATED ORAL 2 TIMES DAILY
Qty: 60 TABLET | Refills: 6 | Status: SHIPPED | OUTPATIENT
Start: 2018-08-08 | End: 2019-04-30 | Stop reason: SDUPTHER

## 2018-08-08 RX ORDER — LEFLUNOMIDE 20 MG/1
TABLET ORAL
Qty: 90 TABLET | Refills: 0 | Status: SHIPPED | OUTPATIENT
Start: 2018-08-08 | End: 2019-02-11 | Stop reason: SDUPTHER

## 2018-08-08 ASSESSMENT — ROUTINE ASSESSMENT OF PATIENT INDEX DATA (RAPID3)
MDHAQ FUNCTION SCORE: .8
PSYCHOLOGICAL DISTRESS SCORE: 3.3
PATIENT GLOBAL ASSESSMENT SCORE: 3
PAIN SCORE: 7
TOTAL RAPID3 SCORE: 4.22

## 2018-08-08 NOTE — PROGRESS NOTES
Subjective:          Chief Complaint: Domi Leary is a 53 y.o. female who had concerns including Seropositive Rheumatoid arthritis (transfer from Dr. Al).    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  Unfortunately by 2009 her arthritis flaring significantly  Seen last in 2017  Having some swelling in MCP 1st on right and 5th on left and right shoulder stiffness. Shoulder is worse at night. Only intermittent pain with dressing in IR with extension. Not overhead.   Feet, knees hips.  AM stiffness: <30 min  Some dry eye, no dry mouth    Previous medications   hydroxychloroquine:  Failure  Enbrel: diffuse psoriasis after starting Enbrel. Resolved with holding.     ,Current regimen:   Leflunomide 20 mg daily continued with her primary as of recent  Diclofenac  75 mg b.i.d.      Component      Latest Ref Rng & Units 1/31/2018 9/4/2009   PIA      Neg <1:160  Negative   CCP Antibodies      <5.0 U/mL  637.7   CRP      0.0 - 8.2 mg/L 12.8 (H) 32.2 (H)   Sed Rate      0 - 20 mm/Hr 12 23 (H)   Rheumatoid Factor      0 - 15 IU/ml  157 (H)     REVIEW OF SYSTEMS:    Review of Systems   Constitutional: Negative for fever, malaise/fatigue and weight loss.   HENT: Negative for sore throat.    Eyes: Negative for double vision, photophobia and redness.   Respiratory: Negative for cough, shortness of breath and wheezing.    Cardiovascular: Negative for chest pain, palpitations and orthopnea.   Gastrointestinal: Negative for abdominal pain, constipation and diarrhea.   Genitourinary: Negative for dysuria, hematuria and urgency.   Musculoskeletal: Positive for joint pain. Negative for back pain and myalgias.   Skin: Negative for rash.   Neurological: Negative for  "dizziness, tingling, focal weakness and headaches.   Endo/Heme/Allergies: Does not bruise/bleed easily.   Psychiatric/Behavioral: Negative for depression, hallucinations and suicidal ideas.               Objective:            Past Medical History:   Diagnosis Date    Diabetes 2/20/2012    Hypothyroid 2/20/2012    Rheumatoid arthritis 2/20/2012     Family History   Problem Relation Age of Onset    Diabetes Maternal Aunt     Breast cancer Maternal Aunt     Cataracts Paternal Grandmother      Social History   Substance Use Topics    Smoking status: Former Smoker     Types: Cigarettes     Quit date: 2/20/2009    Smokeless tobacco: Never Used    Alcohol use Yes      Comment: occassionally         Current Outpatient Prescriptions on File Prior to Visit   Medication Sig Dispense Refill    blood sugar diagnostic (BLOOD GLUCOSE TEST) Strp 1 strip by Misc.(Non-Drug; Combo Route) route once daily. Accu check compact test strips. Check blood sugar daily. 100 strip 12    diclofenac (VOLTAREN) 75 MG EC tablet Take 1 tablet (75 mg total) by mouth 2 (two) times daily. 180 tablet 2    glimepiride (AMARYL) 4 MG tablet TAKE 2 TABLETS BY MOUTH EVERY  tablet 0    insulin needles, disposable, (ULTRA-THIN II INS PEN NEEDLES) 29 x 1/2 " Ndle Pen needles used for victoza pen 100 each 2    INVOKANA 100 mg Tab TAKE 1 TABLET BY MOUTH EVERY DAY. 30 tablet 3    leflunomide (ARAVA) 20 MG Tab TAKE 1 TABLET(20 MG) BY MOUTH EVERY DAY 90 tablet 0    levothyroxine (SYNTHROID, LEVOTHROID) 175 MCG tablet TAKE 1 TABLET(175 MCG) BY MOUTH EVERY DAY 90 tablet 3    metFORMIN (GLUCOPHAGE-XR) 500 MG 24 hr tablet TAKE 2 TABLETS(1000 MG) BY MOUTH TWICE DAILY WITH MEALS 360 tablet 3    traMADol (ULTRAM) 50 mg tablet TAKE ONE TABLET BY MOUTH EVERY 12 HOURS AS NEEDED 60 tablet 0    VICTOZA 3-MOOSE 0.6 mg/0.1 mL (18 mg/3 mL) PnIj INJECT 1.8 MG INTO THE SKIN ONCE DAILY 27 mL 0    [DISCONTINUED] diclofenac (VOLTAREN) 75 MG EC tablet TAKE 1 " TABLET BY MOUTH TWICE DAILY 180 tablet 0    [DISCONTINUED] diclofenac (VOLTAREN) 75 MG EC tablet TAKE 1 TABLET BY MOUTH TWICE DAILY 60 tablet 0    hydroCHLOROthiazide (HYDRODIURIL) 25 MG tablet Take 1 tablet (25 mg total) by mouth once daily. 90 tablet 3    pravastatin (PRAVACHOL) 10 MG tablet TAKE 1 TABLET BY MOUTH ONCE DAILY 30 tablet 3    triamcinolone acetonide 0.1% (KENALOG) 0.1 % ointment Apply topically 2 (two) times daily. 453.6 g 1     No current facility-administered medications on file prior to visit.        Vitals:    08/08/18 1122   BP: (!) 142/75   Pulse: 63       Physical Exam:    Physical Exam   Constitutional: She is oriented to person, place, and time. She appears well-developed and well-nourished.   HENT:   Head: Normocephalic and atraumatic.   Mouth/Throat: Oropharynx is clear and moist.   Eyes: EOM are normal. Pupils are equal, round, and reactive to light.   Neck: Normal range of motion.   Cardiovascular: Normal rate, regular rhythm and normal heart sounds.    Pulmonary/Chest: Effort normal and breath sounds normal.   Musculoskeletal:        Right shoulder: She exhibits normal range of motion, no tenderness and no swelling.        Left shoulder: She exhibits normal range of motion, no tenderness and no swelling.        Right elbow: She exhibits normal range of motion and no swelling. No tenderness found.        Left elbow: She exhibits normal range of motion and no swelling. No tenderness found.        Right wrist: She exhibits normal range of motion, no tenderness and no swelling.        Left wrist: She exhibits normal range of motion, no tenderness and no swelling.        Right knee: She exhibits normal range of motion and no swelling. No tenderness found.        Left knee: She exhibits normal range of motion and no swelling. No tenderness found.        Right hand: She exhibits tenderness and swelling. She exhibits normal range of motion.        Left hand: She exhibits tenderness and  swelling. She exhibits normal range of motion.        Right foot: There is normal range of motion, no tenderness and no swelling.        Left foot: There is normal range of motion, no tenderness and no swelling.   Right 1MCP with synovitis  Left 5th MCP synovitis  Early swan neck deformity.      Neurological: She is alert and oriented to person, place, and time.   Skin: Skin is warm and dry.   Psychiatric: She has a normal mood and affect. Her behavior is normal.             Assessment:       Encounter Diagnoses   Name Primary?    Seropositive rheumatoid arthritis of multiple sites Yes    Type 2 diabetes mellitus with diabetic neuropathy, without long-term current use of insulin           Plan:        Seropositive rheumatoid arthritis of multiple sites  -     leflunomide (ARAVA) 20 MG Tab; TAKE 1 TABLET(20 MG) BY MOUTH EVERY DAY  Dispense: 90 tablet; Refill: 0  -     hydroxychloroquine (PLAQUENIL) 200 mg tablet; Take 1 tablet (200 mg total) by mouth 2 (two) times daily.  Dispense: 60 tablet; Refill: 6  -     X-Ray Hand 3 View Bilateral; Future; Expected date: 08/08/2018  -     Sedimentation rate; Future; Expected date: 08/08/2018  -     C-reactive protein; Future; Expected date: 08/08/2018  -     CBC auto differential; Future; Expected date: 08/08/2018  -     Comprehensive metabolic panel; Future; Expected date: 08/08/2018  -     Rheumatoid factor; Future; Expected date: 08/08/2018  -     Cyclic citrul peptide antibody, IgG; Future; Expected date: 08/08/2018    Type 2 diabetes mellitus with diabetic neuropathy, without long-term current use of insulin    Very pleasant 52 y/o female with hx of sero+ RA and felty's s/p splenectomy    -continue LFA 20mg daily   -add HCQ 200mg BID   -cotninue Diclofenac  Discussed with patient possibility for biologic therapy but would like to try HCQ first.      Discussed the risks benefits and side effects of hydroxychloroquine including but not limited to retinal toxicity, rashes,  nausea.  Patient is aware of the monitoring requirements of an annual eye exam will have this done following a trial to see if  tolerates the medication.      Follow-up in about 4 months (around 12/8/2018).      45min consultation with greater than 50% spent in counseling, chart review and coordination of care. All questions answered.

## 2018-08-14 ENCOUNTER — LAB VISIT (OUTPATIENT)
Dept: LAB | Facility: HOSPITAL | Age: 53
End: 2018-08-14
Attending: FAMILY MEDICINE
Payer: COMMERCIAL

## 2018-08-14 DIAGNOSIS — E11.9 TYPE 2 DIABETES MELLITUS WITHOUT COMPLICATION, WITHOUT LONG-TERM CURRENT USE OF INSULIN: ICD-10-CM

## 2018-08-14 LAB
ANION GAP SERPL CALC-SCNC: 11 MMOL/L
BUN SERPL-MCNC: 16 MG/DL
CALCIUM SERPL-MCNC: 10.2 MG/DL
CHLORIDE SERPL-SCNC: 102 MMOL/L
CHOLEST SERPL-MCNC: 204 MG/DL
CHOLEST/HDLC SERPL: 3.5 {RATIO}
CO2 SERPL-SCNC: 27 MMOL/L
CREAT SERPL-MCNC: 0.8 MG/DL
EST. GFR  (AFRICAN AMERICAN): >60 ML/MIN/1.73 M^2
EST. GFR  (NON AFRICAN AMERICAN): >60 ML/MIN/1.73 M^2
ESTIMATED AVG GLUCOSE: 192 MG/DL
GLUCOSE SERPL-MCNC: 156 MG/DL
HBA1C MFR BLD HPLC: 8.3 %
HDLC SERPL-MCNC: 58 MG/DL
HDLC SERPL: 28.4 %
LDLC SERPL CALC-MCNC: 94.4 MG/DL
NONHDLC SERPL-MCNC: 146 MG/DL
POTASSIUM SERPL-SCNC: 4.8 MMOL/L
SODIUM SERPL-SCNC: 140 MMOL/L
TRIGL SERPL-MCNC: 258 MG/DL

## 2018-08-14 PROCEDURE — 80048 BASIC METABOLIC PNL TOTAL CA: CPT

## 2018-08-14 PROCEDURE — 36415 COLL VENOUS BLD VENIPUNCTURE: CPT | Mod: PN

## 2018-08-14 PROCEDURE — 80061 LIPID PANEL: CPT

## 2018-08-14 PROCEDURE — 83036 HEMOGLOBIN GLYCOSYLATED A1C: CPT

## 2018-08-22 ENCOUNTER — LAB VISIT (OUTPATIENT)
Dept: LAB | Facility: HOSPITAL | Age: 53
End: 2018-08-22
Attending: FAMILY MEDICINE
Payer: COMMERCIAL

## 2018-08-22 ENCOUNTER — OFFICE VISIT (OUTPATIENT)
Dept: FAMILY MEDICINE | Facility: CLINIC | Age: 53
End: 2018-08-22
Payer: COMMERCIAL

## 2018-08-22 VITALS
WEIGHT: 181.75 LBS | BODY MASS INDEX: 32.2 KG/M2 | DIASTOLIC BLOOD PRESSURE: 82 MMHG | TEMPERATURE: 98 F | OXYGEN SATURATION: 97 % | HEIGHT: 63 IN | SYSTOLIC BLOOD PRESSURE: 130 MMHG | HEART RATE: 78 BPM

## 2018-08-22 DIAGNOSIS — E11.9 TYPE 2 DIABETES MELLITUS WITHOUT COMPLICATION, WITHOUT LONG-TERM CURRENT USE OF INSULIN: ICD-10-CM

## 2018-08-22 DIAGNOSIS — E11.40 TYPE 2 DIABETES MELLITUS WITH DIABETIC NEUROPATHY, WITHOUT LONG-TERM CURRENT USE OF INSULIN: ICD-10-CM

## 2018-08-22 DIAGNOSIS — E03.4 HYPOTHYROIDISM DUE TO ACQUIRED ATROPHY OF THYROID: Primary | ICD-10-CM

## 2018-08-22 LAB
T4 FREE SERPL-MCNC: 1.16 NG/DL
TSH SERPL DL<=0.005 MIU/L-ACNC: 4.71 UIU/ML

## 2018-08-22 PROCEDURE — 3045F PR MOST RECENT HEMOGLOBIN A1C LEVEL 7.0-9.0%: CPT | Mod: CPTII,S$GLB,, | Performed by: FAMILY MEDICINE

## 2018-08-22 PROCEDURE — 84443 ASSAY THYROID STIM HORMONE: CPT

## 2018-08-22 PROCEDURE — 3008F BODY MASS INDEX DOCD: CPT | Mod: CPTII,S$GLB,, | Performed by: FAMILY MEDICINE

## 2018-08-22 PROCEDURE — 99999 PR PBB SHADOW E&M-EST. PATIENT-LVL III: CPT | Mod: PBBFAC,,, | Performed by: FAMILY MEDICINE

## 2018-08-22 PROCEDURE — 36415 COLL VENOUS BLD VENIPUNCTURE: CPT | Mod: PN

## 2018-08-22 PROCEDURE — 99214 OFFICE O/P EST MOD 30 MIN: CPT | Mod: S$GLB,,, | Performed by: FAMILY MEDICINE

## 2018-08-22 PROCEDURE — 84439 ASSAY OF FREE THYROXINE: CPT

## 2018-08-22 RX ORDER — METFORMIN HYDROCHLORIDE 500 MG/1
500 TABLET, EXTENDED RELEASE ORAL 2 TIMES DAILY WITH MEALS
Qty: 360 TABLET | Refills: 3 | Status: SHIPPED | OUTPATIENT
Start: 2018-08-22 | End: 2021-02-18 | Stop reason: SDUPTHER

## 2018-08-22 RX ORDER — PRAVASTATIN SODIUM 10 MG/1
10 TABLET ORAL DAILY
Qty: 30 TABLET | Refills: 3 | Status: SHIPPED | OUTPATIENT
Start: 2018-08-22 | End: 2019-02-14 | Stop reason: SDUPTHER

## 2018-08-22 NOTE — PROGRESS NOTES
"Subjective:       Patient ID: Domi Leary is a 53 y.o. female.    Chief Complaint: Follow-up (review of labs)    Follow-up diabetes.  She had stopped metformin during the summer because it seems to cause diarrhea and that interferes with her activities.  Her hemoglobin A1c had increased to 8.3%.  She has recently started Plaquenil for her rheumatoid arthritis.  She is experiencing some nausea from that.  Her last TSH was elevated and I was not sure if  she was still taking her levothyroxine.  She had run out of her pravastatin and interval cane a.  She has managed to lose some weight.  We discussed her diet.      Review of Systems   Constitutional: Positive for unexpected weight change. Negative for fever.   Eyes: Negative for visual disturbance.   Respiratory: Negative for cough and shortness of breath.    Cardiovascular: Negative for chest pain, palpitations and leg swelling.   Endocrine: Positive for cold intolerance.   Musculoskeletal: Positive for arthralgias (Her rheumatoid arthritis seems to be well controlled.).   Neurological: Negative for weakness and numbness.       Objective:     Blood pressure 130/82, pulse 78, temperature 97.8 °F (36.6 °C), temperature source Oral, height 5' 3" (1.6 m), weight 82.5 kg (181 lb 12.3 oz), SpO2 97 %.      Physical Exam   Constitutional:   She is overweight and in no distress.   Neck: No thyromegaly present.   Cardiovascular: Normal rate, regular rhythm, normal heart sounds and intact distal pulses.   No murmur heard.  Pulses:       Dorsalis pedis pulses are 1+ on the right side, and 1+ on the left side.        Posterior tibial pulses are 1+ on the right side, and 1+ on the left side.   No carotid bruits.   Pulmonary/Chest: Effort normal and breath sounds normal. No respiratory distress.   Musculoskeletal: She exhibits no edema.        Right foot: There is no deformity.        Left foot: There is no deformity.   Feet:   Right Foot:   Protective Sensation: 4 sites " tested. 4 sites sensed.   Skin Integrity: Negative for ulcer.   Left Foot:   Protective Sensation: 4 sites tested. 4 sites sensed.   Skin Integrity: Negative for ulcer.   Lymphadenopathy:     She has no cervical adenopathy.   Neurological: She is alert.       Assessment:       1. Type 2 diabetes mellitus without complication, without long-term current use of insulin    2. Type 2 diabetes mellitus with diabetic neuropathy, without long-term current use of insulin        Plan:       Resume metformin ER at a lower dose and see if she tolerates it.  I refilled her pravastatin and in the cane a.  TSH today.  Follow-up lab work an appointment in 3 months.

## 2018-08-24 DIAGNOSIS — Z12.11 COLON CANCER SCREENING: ICD-10-CM

## 2018-10-19 ENCOUNTER — TELEPHONE (OUTPATIENT)
Dept: FAMILY MEDICINE | Facility: CLINIC | Age: 53
End: 2018-10-19

## 2018-10-19 NOTE — TELEPHONE ENCOUNTER
----- Message from Belia Mahajan sent at 10/19/2018  1:31 PM CDT -----  Contact: Self  Type:  Patient Returning Call    Who Called:  Patient   Who Left Message for Patient:  Rabia   Does the patient know what this is regarding?:    Best Call Back Number:  [phone] (home)     Additional Information:

## 2018-10-19 NOTE — TELEPHONE ENCOUNTER
Patient states that she is needing a form filled out that she is in good health due to adopting another child.

## 2018-10-22 ENCOUNTER — PATIENT MESSAGE (OUTPATIENT)
Dept: FAMILY MEDICINE | Facility: CLINIC | Age: 53
End: 2018-10-22

## 2018-11-04 DIAGNOSIS — E11.9 TYPE 2 DIABETES MELLITUS WITHOUT COMPLICATION, WITHOUT LONG-TERM CURRENT USE OF INSULIN: ICD-10-CM

## 2018-11-04 RX ORDER — LIRAGLUTIDE 6 MG/ML
1.8 INJECTION SUBCUTANEOUS DAILY
Qty: 27 ML | Refills: 0 | Status: SHIPPED | OUTPATIENT
Start: 2018-11-04 | End: 2019-02-11 | Stop reason: SDUPTHER

## 2018-11-09 DIAGNOSIS — Z12.39 BREAST CANCER SCREENING: ICD-10-CM

## 2018-11-15 ENCOUNTER — PATIENT OUTREACH (OUTPATIENT)
Dept: ADMINISTRATIVE | Facility: HOSPITAL | Age: 53
End: 2018-11-15

## 2018-11-15 NOTE — LETTER
November 26, 2018    Domi Olson Gibran  [address]  [place] [address]             [facility]  [address]  [facility] [address]  Phone: [phone] Dear Mrs. Leary:    We have tried to reach you by mychart unsuccessfully.      [facility] is committed to your overall health.  To help you get the most out of each of your visits, we will review your information to make sure you are up to date on all of your recommended tests and or procedures.       Dr. Anoop Nicole MD has found that you may be due for:     Colon cancer screening   Annual diabetic eye exam   Mammogram   Flu vaccine     If you have had any of the above done at another facility, please bring the records or information with you so that your record at [facility] will be complete and up to date.     If you have not had any of these tests or procedures done recently and would like to complete this testing before your appointment on 11/29/18 at 11:00 am with Anoop Nicole MD please call [phone] or send a message through your [facility] portal to your provider's office.     If you are currently taking medication, please bring it with you to your appointment for review.         Please feel free to call the number listed below if you have any questions.     Thanks,     Oj Cortes LPN Clinical Care Coordinator   [facility]   [address] [facility] [address]   [place] [address]   [phone] (p)   [phone] (f)

## 2018-11-21 ENCOUNTER — LAB VISIT (OUTPATIENT)
Dept: LAB | Facility: HOSPITAL | Age: 53
End: 2018-11-21
Attending: FAMILY MEDICINE
Payer: COMMERCIAL

## 2018-11-21 DIAGNOSIS — E11.9 TYPE 2 DIABETES MELLITUS WITHOUT COMPLICATION, WITHOUT LONG-TERM CURRENT USE OF INSULIN: ICD-10-CM

## 2018-11-21 LAB
ANION GAP SERPL CALC-SCNC: 10 MMOL/L
BUN SERPL-MCNC: 14 MG/DL
CALCIUM SERPL-MCNC: 10.2 MG/DL
CHLORIDE SERPL-SCNC: 104 MMOL/L
CO2 SERPL-SCNC: 29 MMOL/L
CREAT SERPL-MCNC: 0.8 MG/DL
EST. GFR  (AFRICAN AMERICAN): >60 ML/MIN/1.73 M^2
EST. GFR  (NON AFRICAN AMERICAN): >60 ML/MIN/1.73 M^2
ESTIMATED AVG GLUCOSE: 163 MG/DL
GLUCOSE SERPL-MCNC: 143 MG/DL
HBA1C MFR BLD HPLC: 7.3 %
POTASSIUM SERPL-SCNC: 4.8 MMOL/L
SODIUM SERPL-SCNC: 143 MMOL/L

## 2018-11-21 PROCEDURE — 36415 COLL VENOUS BLD VENIPUNCTURE: CPT | Mod: PN

## 2018-11-21 PROCEDURE — 80048 BASIC METABOLIC PNL TOTAL CA: CPT

## 2018-11-21 PROCEDURE — 83036 HEMOGLOBIN GLYCOSYLATED A1C: CPT

## 2018-11-29 ENCOUNTER — OFFICE VISIT (OUTPATIENT)
Dept: FAMILY MEDICINE | Facility: CLINIC | Age: 53
End: 2018-11-29
Payer: COMMERCIAL

## 2018-11-29 VITALS
WEIGHT: 185.19 LBS | BODY MASS INDEX: 32.81 KG/M2 | OXYGEN SATURATION: 96 % | TEMPERATURE: 98 F | RESPIRATION RATE: 17 BRPM | HEIGHT: 63 IN | DIASTOLIC BLOOD PRESSURE: 70 MMHG | HEART RATE: 68 BPM | SYSTOLIC BLOOD PRESSURE: 138 MMHG

## 2018-11-29 DIAGNOSIS — Z12.11 COLON CANCER SCREENING: ICD-10-CM

## 2018-11-29 DIAGNOSIS — E11.40 TYPE 2 DIABETES MELLITUS WITH DIABETIC NEUROPATHY, WITHOUT LONG-TERM CURRENT USE OF INSULIN: Primary | ICD-10-CM

## 2018-11-29 DIAGNOSIS — E66.9 OBESITY (BMI 30-39.9): ICD-10-CM

## 2018-11-29 PROCEDURE — 90686 IIV4 VACC NO PRSV 0.5 ML IM: CPT | Mod: S$GLB,,, | Performed by: FAMILY MEDICINE

## 2018-11-29 PROCEDURE — 3045F PR MOST RECENT HEMOGLOBIN A1C LEVEL 7.0-9.0%: CPT | Mod: CPTII,S$GLB,, | Performed by: FAMILY MEDICINE

## 2018-11-29 PROCEDURE — 90471 IMMUNIZATION ADMIN: CPT | Mod: S$GLB,,, | Performed by: FAMILY MEDICINE

## 2018-11-29 PROCEDURE — 99214 OFFICE O/P EST MOD 30 MIN: CPT | Mod: 25,S$GLB,, | Performed by: FAMILY MEDICINE

## 2018-11-29 PROCEDURE — 99999 PR PBB SHADOW E&M-EST. PATIENT-LVL IV: CPT | Mod: PBBFAC,,, | Performed by: FAMILY MEDICINE

## 2018-11-29 PROCEDURE — 3008F BODY MASS INDEX DOCD: CPT | Mod: CPTII,S$GLB,, | Performed by: FAMILY MEDICINE

## 2018-11-29 RX ORDER — LANCETS
EACH MISCELLANEOUS
Qty: 100 EACH | Refills: 5 | Status: SHIPPED | OUTPATIENT
Start: 2018-11-29 | End: 2022-11-07

## 2018-11-29 RX ORDER — PEN NEEDLE, DIABETIC 29 G X1/2"
1.8 NEEDLE, DISPOSABLE MISCELLANEOUS DAILY
Qty: 100 EACH | Refills: 11 | Status: SHIPPED | OUTPATIENT
Start: 2018-11-29 | End: 2022-08-08

## 2018-11-29 NOTE — PROGRESS NOTES
Subjective:       Patient ID: Domi Leary is a 53 y.o. female.    Chief Complaint: Follow-up and Nasal Congestion (mucus production, thick-white, and a cough )    Follow-up diabetes.  She is back on her metformin and able to tolerate it.  She had some recent dietary indiscretions and her blood sugar jumped up a little bit and she gained weight.  She has managed to lose most of that weight.  Her most recent hemoglobin A1c was 7.3%.  She is taking Invokana 100 mg, glimepiride 8 mg, Victoza 1.8 mg daily.  Recent upper respiratory infection.  We reviewed health screening.  Fit kit given.  She thinks she had a colonoscopy in the past but we could not find in the record.      Review of Systems   Constitutional: Negative for fever and unexpected weight change.   Respiratory: Negative for shortness of breath.        Objective:      Physical Exam   Constitutional: She is oriented to person, place, and time. She appears well-developed and well-nourished. No distress.   HENT:   Right Ear: External ear normal.   Left Ear: External ear normal.   Mouth/Throat: No oropharyngeal exudate.   Eyes: Conjunctivae and EOM are normal. Pupils are equal, round, and reactive to light. No scleral icterus.   Neck: Normal range of motion. No thyromegaly present.   Cardiovascular: Normal rate, regular rhythm and normal heart sounds.   No murmur heard.  Pulses:       Dorsalis pedis pulses are 1+ on the right side, and 1+ on the left side.        Posterior tibial pulses are 1+ on the right side, and 1+ on the left side.   Pulmonary/Chest: Effort normal. No respiratory distress. She has no wheezes. She has no rales. She exhibits no tenderness.   Abdominal: Soft. She exhibits no distension and no mass. There is no tenderness.   Musculoskeletal: She exhibits no edema.        Right foot: There is no deformity.        Left foot: There is no deformity.   Feet:   Right Foot:   Protective Sensation: 4 sites tested. 4 sites sensed.   Skin Integrity:  Negative for ulcer.   Left Foot:   Protective Sensation: 4 sites tested. 4 sites sensed.   Skin Integrity: Negative for ulcer.   Lymphadenopathy:     She has no cervical adenopathy.   Neurological: She is alert and oriented to person, place, and time.   Normal gait   Skin: No rash noted.   Psychiatric: She has a normal mood and affect.       Assessment:       1. Type 2 diabetes mellitus with diabetic neuropathy, without long-term current use of insulin    2. Obesity (BMI 30-39.9)    3. Colon cancer screening        Plan:       Fit kit, eye exam, return to clinic with lab in 3 months.

## 2018-12-13 ENCOUNTER — LAB VISIT (OUTPATIENT)
Dept: LAB | Facility: HOSPITAL | Age: 53
End: 2018-12-13
Attending: INTERNAL MEDICINE
Payer: COMMERCIAL

## 2018-12-13 ENCOUNTER — OFFICE VISIT (OUTPATIENT)
Dept: RHEUMATOLOGY | Facility: CLINIC | Age: 53
End: 2018-12-13
Payer: COMMERCIAL

## 2018-12-13 VITALS
HEIGHT: 63 IN | SYSTOLIC BLOOD PRESSURE: 116 MMHG | WEIGHT: 183.56 LBS | HEART RATE: 60 BPM | DIASTOLIC BLOOD PRESSURE: 80 MMHG | BODY MASS INDEX: 32.52 KG/M2

## 2018-12-13 DIAGNOSIS — M05.741 RHEUMATOID ARTHRITIS INVOLVING BOTH HANDS WITH POSITIVE RHEUMATOID FACTOR: Primary | ICD-10-CM

## 2018-12-13 DIAGNOSIS — Z79.899 LONG-TERM USE OF PLAQUENIL: ICD-10-CM

## 2018-12-13 DIAGNOSIS — Z79.60 LONG-TERM USE OF IMMUNOSUPPRESSANT MEDICATION: ICD-10-CM

## 2018-12-13 DIAGNOSIS — Z90.81 STATUS POST SPLENECTOMY: ICD-10-CM

## 2018-12-13 DIAGNOSIS — M05.742 RHEUMATOID ARTHRITIS INVOLVING BOTH HANDS WITH POSITIVE RHEUMATOID FACTOR: Primary | ICD-10-CM

## 2018-12-13 DIAGNOSIS — M05.741 RHEUMATOID ARTHRITIS INVOLVING BOTH HANDS WITH POSITIVE RHEUMATOID FACTOR: ICD-10-CM

## 2018-12-13 DIAGNOSIS — M05.742 RHEUMATOID ARTHRITIS INVOLVING BOTH HANDS WITH POSITIVE RHEUMATOID FACTOR: ICD-10-CM

## 2018-12-13 LAB
ALBUMIN SERPL BCP-MCNC: 3.4 G/DL
ALP SERPL-CCNC: 95 U/L
ALT SERPL W/O P-5'-P-CCNC: 42 U/L
ANION GAP SERPL CALC-SCNC: 9 MMOL/L
AST SERPL-CCNC: 30 U/L
BASOPHILS # BLD AUTO: 0.18 K/UL
BASOPHILS NFR BLD: 1.8 %
BILIRUB SERPL-MCNC: 0.4 MG/DL
BUN SERPL-MCNC: 20 MG/DL
CALCIUM SERPL-MCNC: 10.2 MG/DL
CHLORIDE SERPL-SCNC: 105 MMOL/L
CO2 SERPL-SCNC: 29 MMOL/L
CREAT SERPL-MCNC: 0.9 MG/DL
CRP SERPL-MCNC: 5 MG/L
DIFFERENTIAL METHOD: ABNORMAL
EOSINOPHIL # BLD AUTO: 0.5 K/UL
EOSINOPHIL NFR BLD: 4.8 %
ERYTHROCYTE [DISTWIDTH] IN BLOOD BY AUTOMATED COUNT: 14.6 %
ERYTHROCYTE [SEDIMENTATION RATE] IN BLOOD BY WESTERGREN METHOD: 8 MM/HR
EST. GFR  (AFRICAN AMERICAN): >60 ML/MIN/1.73 M^2
EST. GFR  (NON AFRICAN AMERICAN): >60 ML/MIN/1.73 M^2
GLUCOSE SERPL-MCNC: 160 MG/DL
HCT VFR BLD AUTO: 44.1 %
HGB BLD-MCNC: 13.2 G/DL
IMM GRANULOCYTES # BLD AUTO: 0.03 K/UL
IMM GRANULOCYTES NFR BLD AUTO: 0.3 %
LYMPHOCYTES # BLD AUTO: 4.4 K/UL
LYMPHOCYTES NFR BLD: 45.2 %
MCH RBC QN AUTO: 28.6 PG
MCHC RBC AUTO-ENTMCNC: 29.9 G/DL
MCV RBC AUTO: 96 FL
MONOCYTES # BLD AUTO: 1.2 K/UL
MONOCYTES NFR BLD: 12 %
NEUTROPHILS # BLD AUTO: 3.5 K/UL
NEUTROPHILS NFR BLD: 35.9 %
NRBC BLD-RTO: 0 /100 WBC
PLATELET # BLD AUTO: 500 K/UL
PMV BLD AUTO: 11.1 FL
POTASSIUM SERPL-SCNC: 4.1 MMOL/L
PROT SERPL-MCNC: 7.7 G/DL
RBC # BLD AUTO: 4.62 M/UL
SODIUM SERPL-SCNC: 143 MMOL/L
WBC # BLD AUTO: 9.78 K/UL

## 2018-12-13 PROCEDURE — 3008F BODY MASS INDEX DOCD: CPT | Mod: CPTII,S$GLB,, | Performed by: INTERNAL MEDICINE

## 2018-12-13 PROCEDURE — 99214 OFFICE O/P EST MOD 30 MIN: CPT | Mod: S$GLB,,, | Performed by: INTERNAL MEDICINE

## 2018-12-13 PROCEDURE — 99999 PR PBB SHADOW E&M-EST. PATIENT-LVL III: CPT | Mod: PBBFAC,,, | Performed by: INTERNAL MEDICINE

## 2018-12-13 PROCEDURE — 80053 COMPREHEN METABOLIC PANEL: CPT

## 2018-12-13 PROCEDURE — 36415 COLL VENOUS BLD VENIPUNCTURE: CPT | Mod: PO

## 2018-12-13 PROCEDURE — 85025 COMPLETE CBC W/AUTO DIFF WBC: CPT

## 2018-12-13 PROCEDURE — 85651 RBC SED RATE NONAUTOMATED: CPT | Mod: PO

## 2018-12-13 PROCEDURE — 86140 C-REACTIVE PROTEIN: CPT

## 2018-12-13 NOTE — PROGRESS NOTES
Subjective:          Chief Complaint: Domi Leary is a 53 y.o. female who had concerns including Disease Management.    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  Unfortunately by 2009 her arthritis flaring significantly  Seen last in 2017  Having some swelling in MCP 1st on right and 5th on left and right shoulder stiffness. Shoulder is worse at night. Only intermittent pain with dressing in IR with extension. Not overhead.   Feet, knees hips.  AM stiffness: <30 min  Some dry eye, no dry mouth    Previous medications   hydroxychloroquine:  Failure  Enbrel: diffuse psoriasis after starting Enbrel. Resolved with holding.     ,Current regimen:   Leflunomide 20 mg daily continued with her primary as of recent  Diclofenac  75 mg b.i.d.      Component      Latest Ref Rng & Units 1/31/2018 9/4/2009   PIA      Neg <1:160  Negative   CCP Antibodies      <5.0 U/mL  637.7   CRP      0.0 - 8.2 mg/L 12.8 (H) 32.2 (H)   Sed Rate      0 - 20 mm/Hr 12 23 (H)   Rheumatoid Factor      0 - 15 IU/ml  157 (H)     REVIEW OF SYSTEMS:    Review of Systems   Constitutional: Negative for fever, malaise/fatigue and weight loss.   HENT: Negative for sore throat.    Eyes: Negative for double vision, photophobia and redness.   Respiratory: Negative for cough, shortness of breath and wheezing.    Cardiovascular: Negative for chest pain, palpitations and orthopnea.   Gastrointestinal: Negative for abdominal pain, constipation and diarrhea.   Genitourinary: Negative for dysuria, hematuria and urgency.   Musculoskeletal: Positive for joint pain. Negative for back pain and myalgias.   Skin: Negative for rash.   Neurological: Negative for dizziness, tingling, focal weakness and  "headaches.   Endo/Heme/Allergies: Does not bruise/bleed easily.   Psychiatric/Behavioral: Negative for depression, hallucinations and suicidal ideas.               Objective:            Past Medical History:   Diagnosis Date    Diabetes 2012    Hypothyroid 2012    Rheumatoid arthritis 2012     Family History   Problem Relation Age of Onset    Diabetes Maternal Aunt     Breast cancer Maternal Aunt     Cataracts Paternal Grandmother      Social History     Tobacco Use    Smoking status: Former Smoker     Types: Cigarettes     Last attempt to quit: 2009     Years since quittin.8    Smokeless tobacco: Never Used   Substance Use Topics    Alcohol use: Yes     Comment: occassionally    Drug use: No         Current Outpatient Medications on File Prior to Visit   Medication Sig Dispense Refill    blood sugar diagnostic Strp To check BG 1 times daily, to use with insurance preferred meter 100 strip 5    canagliflozin (INVOKANA) 100 mg Tab Take 1 tablet (100 mg total) by mouth once daily. 30 tablet 3    diclofenac (VOLTAREN) 75 MG EC tablet Take 1 tablet (75 mg total) by mouth 2 (two) times daily. 180 tablet 2    glimepiride (AMARYL) 4 MG tablet TAKE 2 TABLETS BY MOUTH EVERY  tablet 0    hydroxychloroquine (PLAQUENIL) 200 mg tablet Take 1 tablet (200 mg total) by mouth 2 (two) times daily. 60 tablet 6    lancets Misc To check BG 1 times daily, to use with insurance preferred meter 100 each 5    leflunomide (ARAVA) 20 MG Tab TAKE 1 TABLET(20 MG) BY MOUTH EVERY DAY 90 tablet 0    levothyroxine (SYNTHROID, LEVOTHROID) 175 MCG tablet TAKE 1 TABLET(175 MCG) BY MOUTH EVERY DAY 90 tablet 3    metFORMIN (GLUCOPHAGE-XR) 500 MG 24 hr tablet Take 1 tablet (500 mg total) by mouth 2 (two) times daily with meals. 360 tablet 3    pen needle, diabetic 31 gauge x 1/4" Ndle 1.8 mg by Misc.(Non-Drug; Combo Route) route once daily. 100 each 11    pravastatin (PRAVACHOL) 10 MG tablet Take 1 " tablet (10 mg total) by mouth once daily. 30 tablet 3    traMADol (ULTRAM) 50 mg tablet TAKE ONE TABLET BY MOUTH EVERY 12 HOURS AS NEEDED 60 tablet 0    triamcinolone acetonide 0.1% (KENALOG) 0.1 % ointment Apply topically 2 (two) times daily. 453.6 g 1    VICTOZA 3-MOOSE 0.6 mg/0.1 mL (18 mg/3 mL) PnIj INJECT 1.8 MG INTO THE SKIN ONCE DAILY 27 mL 0     No current facility-administered medications on file prior to visit.        Vitals:    12/13/18 0845   BP: 116/80   Pulse: 60       Physical Exam:    Physical Exam   Constitutional: She is oriented to person, place, and time. She appears well-developed and well-nourished.   HENT:   Head: Normocephalic and atraumatic.   Mouth/Throat: Oropharynx is clear and moist.   Eyes: EOM are normal. Pupils are equal, round, and reactive to light.   Neck: Normal range of motion.   Cardiovascular: Normal rate, regular rhythm and normal heart sounds.   Pulmonary/Chest: Effort normal and breath sounds normal.   Musculoskeletal:        Right shoulder: She exhibits normal range of motion, no tenderness and no swelling.        Left shoulder: She exhibits normal range of motion, no tenderness and no swelling.        Right elbow: She exhibits normal range of motion and no swelling. No tenderness found.        Left elbow: She exhibits normal range of motion and no swelling. No tenderness found.        Right wrist: She exhibits normal range of motion, no tenderness and no swelling.        Left wrist: She exhibits normal range of motion, no tenderness and no swelling.        Right knee: She exhibits normal range of motion and no swelling. No tenderness found.        Left knee: She exhibits normal range of motion and no swelling. No tenderness found.        Right hand: She exhibits tenderness and swelling. She exhibits normal range of motion.        Left hand: She exhibits tenderness and swelling. She exhibits normal range of motion.        Right foot: There is normal range of motion, no  tenderness and no swelling.        Left foot: There is normal range of motion, no tenderness and no swelling.   Right 1MCP with synovitis  Left 5th MCP synovitis  Early swan neck deformity.      Neurological: She is alert and oriented to person, place, and time.   Skin: Skin is warm and dry.   Psychiatric: She has a normal mood and affect. Her behavior is normal.     Component      Latest Ref Rng & Units 8/8/2018   Sed Rate      0 - 20 mm/Hr 15   CRP      0.0 - 8.2 mg/L 10.0 (H)   Rheumatoid Factor      0.0 - 15.0 IU/mL 85.0 (H)   CCP Antibodies      <5.0 U/mL 646.5 (H)             Assessment:       Encounter Diagnoses   Name Primary?    Rheumatoid arthritis involving both hands with positive rheumatoid factor Yes    Status post splenectomy     Long-term use of immunosuppressant medication     Long-term use of Plaquenil           Plan:        Rheumatoid arthritis involving both hands with positive rheumatoid factor  -     CBC auto differential; Standing; Expected date: 12/13/2018  -     Comprehensive metabolic panel; Standing; Expected date: 12/13/2018  -     Sedimentation rate; Standing; Expected date: 12/13/2018  -     C-reactive protein; Standing; Expected date: 12/13/2018    Status post splenectomy    Long-term use of immunosuppressant medication  -     CBC auto differential; Standing; Expected date: 12/13/2018  -     Comprehensive metabolic panel; Standing; Expected date: 12/13/2018  -     Sedimentation rate; Standing; Expected date: 12/13/2018  -     C-reactive protein; Standing; Expected date: 12/13/2018    Long-term use of Plaquenil  -     CBC auto differential; Standing; Expected date: 12/13/2018  -     Comprehensive metabolic panel; Standing; Expected date: 12/13/2018  -     Sedimentation rate; Standing; Expected date: 12/13/2018  -     C-reactive protein; Standing; Expected date: 12/13/2018         Very pleasant 52 y/o female with hx of sero+ RA and felty's s/p splenectomy    -continue LFA 20mg  daily   -add HCQ 200mg BID   -cotninue Diclofenac  Discussed with patient possibility for biologic therapy but having good response and tolerance with HCQ          No Follow-up on file.   F/u 4 months      30min consultation with greater than 50% spent in counseling, chart review and coordination of care. All questions answered.

## 2019-02-11 DIAGNOSIS — M05.79 SEROPOSITIVE RHEUMATOID ARTHRITIS OF MULTIPLE SITES: ICD-10-CM

## 2019-02-11 DIAGNOSIS — E11.9 TYPE 2 DIABETES MELLITUS WITHOUT COMPLICATION, WITHOUT LONG-TERM CURRENT USE OF INSULIN: ICD-10-CM

## 2019-02-11 RX ORDER — LEFLUNOMIDE 20 MG/1
TABLET ORAL
Qty: 90 TABLET | Refills: 2 | Status: SHIPPED | OUTPATIENT
Start: 2019-02-11 | End: 2019-05-14 | Stop reason: SDUPTHER

## 2019-02-14 DIAGNOSIS — E11.40 TYPE 2 DIABETES MELLITUS WITH DIABETIC NEUROPATHY, WITHOUT LONG-TERM CURRENT USE OF INSULIN: ICD-10-CM

## 2019-02-14 RX ORDER — PRAVASTATIN SODIUM 10 MG/1
10 TABLET ORAL DAILY
Qty: 30 TABLET | Refills: 3 | Status: SHIPPED | OUTPATIENT
Start: 2019-02-14 | End: 2019-08-12 | Stop reason: SDUPTHER

## 2019-02-14 NOTE — TELEPHONE ENCOUNTER
----- Message from Ghada Villalobos sent at 2/14/2019  1:38 PM CST -----  pharmacy calling about pravastatin (PRAVACHOL) 10 MG tab / requested 02/07 ...  loaned the patient a few tablets but she is out / asking for the refill to be sent .....[facility] [id number] [place] [address] [address] [place] [address]  [address] [facility] [address]  Phone: [phone] Fax: [phone]

## 2019-02-18 RX ORDER — DICLOFENAC SODIUM 75 MG/1
TABLET, DELAYED RELEASE ORAL
Qty: 60 TABLET | Refills: 0 | OUTPATIENT
Start: 2019-02-18

## 2019-02-19 NOTE — TELEPHONE ENCOUNTER
----- Message from Ghada Villalobos sent at 2/19/2019  1:41 PM CST -----  Type:  RX Refill Request    Who Called:  self  Refill or New Rx:  new  RX Name and Strength:  diclofenac (VOLTAREN) 75 MG EC tablet  How is the patient currently taking it? (ex. 1XDay):     Is this a 30 day or 90 day RX:     Preferred Pharmacy with phone number:    [facility] [id number] [facility] [address] [facility]  [address] [place] [address]  Phone: [phone] Fax: [phone]    Local or Mail Order:  Local   Ordering Provider:     Best Call Back Number:  [phone]   Additional Information:  Pt is out / forgot that Dr Chakraborty had not filled this one / asking for as early as possible

## 2019-02-20 ENCOUNTER — PATIENT MESSAGE (OUTPATIENT)
Dept: RHEUMATOLOGY | Facility: CLINIC | Age: 54
End: 2019-02-20

## 2019-02-21 RX ORDER — DICLOFENAC SODIUM 75 MG/1
75 TABLET, DELAYED RELEASE ORAL 2 TIMES DAILY
Qty: 180 TABLET | Refills: 2 | Status: SHIPPED | OUTPATIENT
Start: 2019-02-21 | End: 2019-05-14

## 2019-03-01 ENCOUNTER — OFFICE VISIT (OUTPATIENT)
Dept: FAMILY MEDICINE | Facility: CLINIC | Age: 54
End: 2019-03-01
Payer: COMMERCIAL

## 2019-03-01 VITALS
OXYGEN SATURATION: 96 % | BODY MASS INDEX: 33.53 KG/M2 | HEIGHT: 63 IN | HEART RATE: 89 BPM | DIASTOLIC BLOOD PRESSURE: 86 MMHG | WEIGHT: 189.25 LBS | SYSTOLIC BLOOD PRESSURE: 140 MMHG

## 2019-03-01 DIAGNOSIS — M25.512 ACUTE PAIN OF LEFT SHOULDER: Primary | ICD-10-CM

## 2019-03-01 DIAGNOSIS — M79.642 LEFT HAND PAIN: ICD-10-CM

## 2019-03-01 DIAGNOSIS — M25.562 ACUTE PAIN OF LEFT KNEE: ICD-10-CM

## 2019-03-01 DIAGNOSIS — T14.90XA TRAUMA: ICD-10-CM

## 2019-03-01 PROCEDURE — 99999 PR PBB SHADOW E&M-EST. PATIENT-LVL IV: ICD-10-PCS | Mod: PBBFAC,,, | Performed by: NURSE PRACTITIONER

## 2019-03-01 PROCEDURE — 99214 OFFICE O/P EST MOD 30 MIN: CPT | Mod: S$GLB,,, | Performed by: NURSE PRACTITIONER

## 2019-03-01 PROCEDURE — 99999 PR PBB SHADOW E&M-EST. PATIENT-LVL IV: CPT | Mod: PBBFAC,,, | Performed by: NURSE PRACTITIONER

## 2019-03-01 PROCEDURE — 99214 PR OFFICE/OUTPT VISIT, EST, LEVL IV, 30-39 MIN: ICD-10-PCS | Mod: S$GLB,,, | Performed by: NURSE PRACTITIONER

## 2019-03-01 PROCEDURE — 3008F PR BODY MASS INDEX (BMI) DOCUMENTED: ICD-10-PCS | Mod: CPTII,S$GLB,, | Performed by: NURSE PRACTITIONER

## 2019-03-01 PROCEDURE — 3008F BODY MASS INDEX DOCD: CPT | Mod: CPTII,S$GLB,, | Performed by: NURSE PRACTITIONER
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
## 2019-03-01 NOTE — PATIENT INSTRUCTIONS
Take the Voltaren and tramadol as needed.    Exercises for Shoulder Flexibility: Internal Rotation    This stretch can help restore shoulder flexibility and relieve pain over time. When stretching, be sure to breathe deeply. Follow any special instructions from your healthcare provider or physical therapist.  1. While seated, move the arm on the side you want to stretch toward the middle of your back. The palm of your hand should face out.  2. Cup your other hand under the hand thats behind your back. Gently push your cupped hand upward until you feel the stretch in the shoulder. Try to hold the stretch for 5 seconds.  3. Work up to doing 3 sets of this stretch, 3 times a day. Work up to holding the stretch for 30 to 60 seconds.  Note: Keep your back straight. Its OK if your hand cant reach the middle of your back. Instead, start the stretch with your hand as close as you can get it to the middle of your back.     Frozen shoulder  Frozen shoulder is another name for adhesive capsulitis. This causes restricted movement in the shoulder. If you have frozen shoulder, this stretch may cause discomfort, especially when you first get started. A few months may pass before you achieve the results you want. But once your shoulder heals, it rarely becomes frozen again. So stick to your stretching program. If you have any questions, be sure to ask your healthcare provider.   Date Last Reviewed: 10/14/2015  © 9593-8099 "[employer]. [address] [place] [address], [place] [address]. All rights reserved. This information is not intended as a substitute for professional medical care. Always follow your healthcare professional's instructions.        Exercises for Shoulder Flexibility: Adduction (Reaching Across)    This stretch can help restore shoulder flexibility and relieve pain over time. When stretching, be sure to breathe deeply. And follow any special instructions from your doctor or physical therapist:  4. Put the  hand from the side you want to stretch on your opposite shoulder. Your elbow should point away from your body. Try to raise your elbow as close to shoulder height as you can.  5. With your other hand, push the raised elbow toward the opposite shoulder. Avoid turning your head. Stop when you feel the stretch. Try to hold the stretch for 5 seconds.  6. Work up to doing 3 sets of this stretch, 3 times a day. Work up to holding the stretch for 30 to 60 seconds.  Note: Be sure to push your elbow across your chest, not up toward your chin. Over time, try to push your elbow farther across your chest to enhance the stretch.  Frozen shoulder  Frozen shoulder is another name for adhesive capsulitis, which causes restricted movement in the shoulder. If you have frozen shoulder, this stretch may cause discomfort, especially when you first get started. A few months may pass before you achieve the results you want. Once your shoulder heals, it rarely becomes frozen again. So stick to your stretching program. If you have any questions, be sure to ask your doctor.   Date Last Reviewed: 8/16/2015 © 2000-2017 [employer]. [address] [place] [address]. All rights reserved. This information is not intended as a substitute for professional medical care. Always follow your healthcare professional's instructions.        Exercises for Shoulder Flexibility: Wall Walk    Improving your flexibility can reduce pain. Stretching exercises also can help increase your range of pain-free motion. Breathe normally when you exercise. Use smooth, fluid movements.  Note: Follow any special instructions you are given. If you feel pain, stop the exercise. If the pain continues after stopping, call your healthcare provider:  · Stand with your shoulder about 2 feet from the wall.  · Raise your arm to shoulder level and gently walk your fingers up the wall as high as you can.  · Hold for a few seconds. Then walk your fingers  back down.  · Repeat 3 times. Move closer to the wall as you repeat.  · Build up to holding each stretch for 30 seconds.  Caution: Do this stretch only if your healthcare provider recommends it. Dont do it when you are first injured.       Date Last Reviewed: 8/16/2015  © 9247-3727 [email or web address]. [address] [place] [address]. All rights reserved. This information is not intended as a substitute for professional medical care. Always follow your healthcare professional's instructions.        Exercises for Shoulder Flexibility: Broom Stretch    Improving your flexibility can reduce pain. Stretching exercises also can help increase your range of pain-free motion. Breathe normally when you exercise. Try to use smooth, fluid movements. Never force a stretch.  · Stand up or lie on the floor. Place the palm of your hand over the end of a broomstick or cane. Grasp the stick farther down with the other hand, palm facing down.  · Push the end of the stick up on the side of your injured shoulder as high as is reasonably comfortable.  · Hold for a few seconds. Return to the starting position.  · Repeat 3 to 5 times. Build up to holding each stretch for 10 to 30 seconds.  Note: Follow any special instructions you are given. If you feel pain, stop the exercise. If the pain continues after stopping, call your healthcare provider.   Date Last Reviewed: 10/14/2015  © 4930-2720 [email or web address]. [address] [place] [address]. All rights reserved. This information is not intended as a substitute for professional medical care. Always follow your healthcare professional's instructions.

## 2019-03-13 DIAGNOSIS — E11.9 TYPE 2 DIABETES MELLITUS WITHOUT COMPLICATION, WITHOUT LONG-TERM CURRENT USE OF INSULIN: ICD-10-CM

## 2019-03-13 NOTE — TELEPHONE ENCOUNTER
Pt Of Anoop Nicole MD    Last seen on: 3-1-19    Next appt: n/a    Last refill: 8-1-18    Allergies:   Review of patient's allergies indicates:   Allergen Reactions    Amoxil [amoxicillin] Hives       Pharmacy:   [facility] [address] [place] [address] [address] [facility]  [address] [facility] [address]  Phone: [phone] Fax: [phone]      Labs: Please review.      Please review! Thank you!

## 2019-03-29 ENCOUNTER — TELEPHONE (OUTPATIENT)
Dept: RHEUMATOLOGY | Facility: CLINIC | Age: 54
End: 2019-03-29

## 2019-03-29 NOTE — TELEPHONE ENCOUNTER
----- Message from Floyd Olson sent at 3/29/2019 11:49 AM CDT -----  Contact: Patient  Advised needs to see the Dr as soon as possible She has severe pain in her left hand and is dropping things this is not normal for her.  Also needs a refill on: traMADol (ULTRAM) 50 mg tablet  Please call [phone]      [facility] [address] [place] [address] [address] [place] [address]  [address] [place] [id number]  Phone: [phone] Fax: [phone]

## 2019-03-29 NOTE — TELEPHONE ENCOUNTER
Attempted to reach patient. No answer. lvm for patient to return clinic's call. Informed patient Dr. Chakraborty is not in office on Fridays.

## 2019-04-05 DIAGNOSIS — E11.9 TYPE 2 DIABETES MELLITUS WITHOUT COMPLICATION, UNSPECIFIED WHETHER LONG TERM INSULIN USE: ICD-10-CM

## 2019-04-10 ENCOUNTER — PATIENT OUTREACH (OUTPATIENT)
Dept: ADMINISTRATIVE | Facility: HOSPITAL | Age: 54
End: 2019-04-10

## 2019-04-10 ENCOUNTER — HOSPITAL ENCOUNTER (OUTPATIENT)
Dept: RADIOLOGY | Facility: HOSPITAL | Age: 54
Discharge: HOME | End: 2019-04-10
Attending: FAMILY MEDICINE
Payer: COMMERCIAL

## 2019-04-10 ENCOUNTER — OFFICE VISIT (OUTPATIENT)
Dept: FAMILY MEDICINE | Facility: CLINIC | Age: 54
End: 2019-04-10
Payer: COMMERCIAL

## 2019-04-10 VITALS
BODY MASS INDEX: 34.24 KG/M2 | HEART RATE: 82 BPM | HEIGHT: 63 IN | DIASTOLIC BLOOD PRESSURE: 86 MMHG | WEIGHT: 193.25 LBS | SYSTOLIC BLOOD PRESSURE: 136 MMHG

## 2019-04-10 DIAGNOSIS — E66.9 OBESITY (BMI 30-39.9): ICD-10-CM

## 2019-04-10 DIAGNOSIS — E03.4 HYPOTHYROIDISM DUE TO ACQUIRED ATROPHY OF THYROID: Primary | ICD-10-CM

## 2019-04-10 DIAGNOSIS — M05.79 SEROPOSITIVE RHEUMATOID ARTHRITIS OF MULTIPLE SITES: ICD-10-CM

## 2019-04-10 DIAGNOSIS — E11.40 TYPE 2 DIABETES MELLITUS WITH DIABETIC NEUROPATHY, WITHOUT LONG-TERM CURRENT USE OF INSULIN: ICD-10-CM

## 2019-04-10 DIAGNOSIS — I10 ESSENTIAL HYPERTENSION: ICD-10-CM

## 2019-04-10 PROCEDURE — 73130 XR HAND COMPLETE 3 VIEW LEFT: ICD-10-PCS | Mod: 26,LT,, | Performed by: RADIOLOGY

## 2019-04-10 PROCEDURE — 99999 PR PBB SHADOW E&M-EST. PATIENT-LVL IV: CPT | Mod: PBBFAC,,, | Performed by: FAMILY MEDICINE

## 2019-04-10 PROCEDURE — 99214 PR OFFICE/OUTPT VISIT, EST, LEVL IV, 30-39 MIN: ICD-10-PCS | Mod: S$GLB,,, | Performed by: FAMILY MEDICINE

## 2019-04-10 PROCEDURE — 3075F SYST BP GE 130 - 139MM HG: CPT | Mod: CPTII,S$GLB,, | Performed by: FAMILY MEDICINE

## 2019-04-10 PROCEDURE — 73130 X-RAY EXAM OF HAND: CPT | Mod: 26,LT,, | Performed by: RADIOLOGY

## 2019-04-10 PROCEDURE — 99214 OFFICE O/P EST MOD 30 MIN: CPT | Mod: S$GLB,,, | Performed by: FAMILY MEDICINE

## 2019-04-10 PROCEDURE — 3075F PR MOST RECENT SYSTOLIC BLOOD PRESS GE 130-139MM HG: ICD-10-PCS | Mod: CPTII,S$GLB,, | Performed by: FAMILY MEDICINE

## 2019-04-10 PROCEDURE — 3008F PR BODY MASS INDEX (BMI) DOCUMENTED: ICD-10-PCS | Mod: CPTII,S$GLB,, | Performed by: FAMILY MEDICINE

## 2019-04-10 PROCEDURE — 3008F BODY MASS INDEX DOCD: CPT | Mod: CPTII,S$GLB,, | Performed by: FAMILY MEDICINE

## 2019-04-10 PROCEDURE — 3079F DIAST BP 80-89 MM HG: CPT | Mod: CPTII,S$GLB,, | Performed by: FAMILY MEDICINE

## 2019-04-10 PROCEDURE — 99999 PR PBB SHADOW E&M-EST. PATIENT-LVL IV: ICD-10-PCS | Mod: PBBFAC,,, | Performed by: FAMILY MEDICINE

## 2019-04-10 PROCEDURE — 3045F PR MOST RECENT HEMOGLOBIN A1C LEVEL 7.0-9.0%: ICD-10-PCS | Mod: CPTII,S$GLB,, | Performed by: FAMILY MEDICINE

## 2019-04-10 PROCEDURE — 3045F PR MOST RECENT HEMOGLOBIN A1C LEVEL 7.0-9.0%: CPT | Mod: CPTII,S$GLB,, | Performed by: FAMILY MEDICINE

## 2019-04-10 PROCEDURE — 73130 X-RAY EXAM OF HAND: CPT | Mod: TC,PN,LT

## 2019-04-10 PROCEDURE — 3079F PR MOST RECENT DIASTOLIC BLOOD PRESSURE 80-89 MM HG: ICD-10-PCS | Mod: CPTII,S$GLB,, | Performed by: FAMILY MEDICINE

## 2019-04-10 NOTE — PATIENT INSTRUCTIONS
Record bp twice a month. Some in am and some in pm  Bring glucose log. Twice a week. Some fasting and some before a meal.   Optometry. Dr. King. [phone]  Bring in the stool kit.
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
## 2019-04-30 DIAGNOSIS — M05.79 SEROPOSITIVE RHEUMATOID ARTHRITIS OF MULTIPLE SITES: ICD-10-CM

## 2019-05-01 ENCOUNTER — TELEPHONE (OUTPATIENT)
Dept: FAMILY MEDICINE | Facility: CLINIC | Age: 54
End: 2019-05-01

## 2019-05-01 NOTE — TELEPHONE ENCOUNTER
Domi Leary (Key: [id number]) - [id number]  Invokana 100MG tablets  Status: Sent to Plan    Created: April 30th, 2019 [phone]    Sent: May 1st, 2019    [facility]  [phone]

## 2019-05-02 RX ORDER — HYDROXYCHLOROQUINE SULFATE 200 MG/1
200 TABLET, FILM COATED ORAL 2 TIMES DAILY
Qty: 60 TABLET | Refills: 6 | Status: SHIPPED | OUTPATIENT
Start: 2019-05-02 | End: 2019-05-14 | Stop reason: SDUPTHER

## 2019-05-06 NOTE — TELEPHONE ENCOUNTER
PA for Invokanna denied. Preferred alternatives include both Farxiga and Jardiance, however both of these will also require a PA. Please advise how to proceed.

## 2019-05-14 ENCOUNTER — OFFICE VISIT (OUTPATIENT)
Dept: RHEUMATOLOGY | Facility: CLINIC | Age: 54
End: 2019-05-14
Payer: COMMERCIAL

## 2019-05-14 ENCOUNTER — LAB VISIT (OUTPATIENT)
Dept: LAB | Facility: HOSPITAL | Age: 54
End: 2019-05-14
Attending: INTERNAL MEDICINE
Payer: COMMERCIAL

## 2019-05-14 VITALS
HEART RATE: 88 BPM | SYSTOLIC BLOOD PRESSURE: 151 MMHG | BODY MASS INDEX: 34.24 KG/M2 | HEIGHT: 63 IN | DIASTOLIC BLOOD PRESSURE: 87 MMHG | WEIGHT: 193.25 LBS

## 2019-05-14 DIAGNOSIS — M05.79 SEROPOSITIVE RHEUMATOID ARTHRITIS OF MULTIPLE SITES: ICD-10-CM

## 2019-05-14 DIAGNOSIS — M05.742 RHEUMATOID ARTHRITIS INVOLVING BOTH HANDS WITH POSITIVE RHEUMATOID FACTOR: Primary | ICD-10-CM

## 2019-05-14 DIAGNOSIS — M25.449: ICD-10-CM

## 2019-05-14 DIAGNOSIS — M05.741 RHEUMATOID ARTHRITIS INVOLVING BOTH HANDS WITH POSITIVE RHEUMATOID FACTOR: ICD-10-CM

## 2019-05-14 DIAGNOSIS — M05.741 RHEUMATOID ARTHRITIS INVOLVING BOTH HANDS WITH POSITIVE RHEUMATOID FACTOR: Primary | ICD-10-CM

## 2019-05-14 DIAGNOSIS — M05.742 RHEUMATOID ARTHRITIS INVOLVING BOTH HANDS WITH POSITIVE RHEUMATOID FACTOR: ICD-10-CM

## 2019-05-14 LAB
ALBUMIN SERPL BCP-MCNC: 3.6 G/DL (ref 3.5–5.2)
ALP SERPL-CCNC: 118 U/L (ref 55–135)
ALT SERPL W/O P-5'-P-CCNC: 36 U/L (ref 10–44)
ANION GAP SERPL CALC-SCNC: 11 MMOL/L (ref 8–16)
AST SERPL-CCNC: 30 U/L (ref 10–40)
BASOPHILS # BLD AUTO: 0.18 K/UL (ref 0–0.2)
BASOPHILS NFR BLD: 1.9 % (ref 0–1.9)
BILIRUB SERPL-MCNC: 0.2 MG/DL (ref 0.1–1)
BUN SERPL-MCNC: 17 MG/DL (ref 6–20)
CALCIUM SERPL-MCNC: 10.7 MG/DL (ref 8.7–10.5)
CHLORIDE SERPL-SCNC: 103 MMOL/L (ref 95–110)
CO2 SERPL-SCNC: 28 MMOL/L (ref 23–29)
CREAT SERPL-MCNC: 0.9 MG/DL (ref 0.5–1.4)
CRP SERPL-MCNC: 12 MG/L (ref 0–8.2)
DIFFERENTIAL METHOD: ABNORMAL
EOSINOPHIL # BLD AUTO: 0.3 K/UL (ref 0–0.5)
EOSINOPHIL NFR BLD: 2.7 % (ref 0–8)
ERYTHROCYTE [DISTWIDTH] IN BLOOD BY AUTOMATED COUNT: 14.8 % (ref 11.5–14.5)
ERYTHROCYTE [SEDIMENTATION RATE] IN BLOOD BY WESTERGREN METHOD: 18 MM/HR (ref 0–20)
EST. GFR  (AFRICAN AMERICAN): >60 ML/MIN/1.73 M^2
EST. GFR  (NON AFRICAN AMERICAN): >60 ML/MIN/1.73 M^2
GLUCOSE SERPL-MCNC: 196 MG/DL (ref 70–110)
HCT VFR BLD AUTO: 44.4 % (ref 37–48.5)
HGB BLD-MCNC: 13.6 G/DL (ref 12–16)
IMM GRANULOCYTES # BLD AUTO: 0.03 K/UL (ref 0–0.04)
IMM GRANULOCYTES NFR BLD AUTO: 0.3 % (ref 0–0.5)
LYMPHOCYTES # BLD AUTO: 4.6 K/UL (ref 1–4.8)
LYMPHOCYTES NFR BLD: 47.6 % (ref 18–48)
MCH RBC QN AUTO: 28.5 PG (ref 27–31)
MCHC RBC AUTO-ENTMCNC: 30.6 G/DL (ref 32–36)
MCV RBC AUTO: 93 FL (ref 82–98)
MONOCYTES # BLD AUTO: 0.9 K/UL (ref 0.3–1)
MONOCYTES NFR BLD: 9.3 % (ref 4–15)
NEUTROPHILS # BLD AUTO: 3.7 K/UL (ref 1.8–7.7)
NEUTROPHILS NFR BLD: 38.2 % (ref 38–73)
NRBC BLD-RTO: 0 /100 WBC
PLATELET # BLD AUTO: 470 K/UL (ref 150–350)
PMV BLD AUTO: 11.8 FL (ref 9.2–12.9)
POTASSIUM SERPL-SCNC: 4.9 MMOL/L (ref 3.5–5.1)
PROT SERPL-MCNC: 8.1 G/DL (ref 6–8.4)
RBC # BLD AUTO: 4.77 M/UL (ref 4–5.4)
SODIUM SERPL-SCNC: 142 MMOL/L (ref 136–145)
WBC # BLD AUTO: 9.66 K/UL (ref 3.9–12.7)

## 2019-05-14 PROCEDURE — 3008F BODY MASS INDEX DOCD: CPT | Mod: CPTII,S$GLB,, | Performed by: INTERNAL MEDICINE

## 2019-05-14 PROCEDURE — 36415 COLL VENOUS BLD VENIPUNCTURE: CPT | Mod: PO

## 2019-05-14 PROCEDURE — 3077F PR MOST RECENT SYSTOLIC BLOOD PRESSURE >= 140 MM HG: ICD-10-PCS | Mod: CPTII,S$GLB,, | Performed by: INTERNAL MEDICINE

## 2019-05-14 PROCEDURE — 99214 OFFICE O/P EST MOD 30 MIN: CPT | Mod: S$GLB,,, | Performed by: INTERNAL MEDICINE

## 2019-05-14 PROCEDURE — 3079F PR MOST RECENT DIASTOLIC BLOOD PRESSURE 80-89 MM HG: ICD-10-PCS | Mod: CPTII,S$GLB,, | Performed by: INTERNAL MEDICINE

## 2019-05-14 PROCEDURE — 85651 RBC SED RATE NONAUTOMATED: CPT | Mod: PO

## 2019-05-14 PROCEDURE — 80053 COMPREHEN METABOLIC PANEL: CPT

## 2019-05-14 PROCEDURE — 99999 PR PBB SHADOW E&M-EST. PATIENT-LVL III: CPT | Mod: PBBFAC,,, | Performed by: INTERNAL MEDICINE

## 2019-05-14 PROCEDURE — 85025 COMPLETE CBC W/AUTO DIFF WBC: CPT

## 2019-05-14 PROCEDURE — 3079F DIAST BP 80-89 MM HG: CPT | Mod: CPTII,S$GLB,, | Performed by: INTERNAL MEDICINE

## 2019-05-14 PROCEDURE — 3077F SYST BP >= 140 MM HG: CPT | Mod: CPTII,S$GLB,, | Performed by: INTERNAL MEDICINE

## 2019-05-14 PROCEDURE — 99999 PR PBB SHADOW E&M-EST. PATIENT-LVL III: ICD-10-PCS | Mod: PBBFAC,,, | Performed by: INTERNAL MEDICINE

## 2019-05-14 PROCEDURE — 3008F PR BODY MASS INDEX (BMI) DOCUMENTED: ICD-10-PCS | Mod: CPTII,S$GLB,, | Performed by: INTERNAL MEDICINE

## 2019-05-14 PROCEDURE — 86140 C-REACTIVE PROTEIN: CPT

## 2019-05-14 PROCEDURE — 99214 PR OFFICE/OUTPT VISIT, EST, LEVL IV, 30-39 MIN: ICD-10-PCS | Mod: S$GLB,,, | Performed by: INTERNAL MEDICINE

## 2019-05-14 RX ORDER — NABUMETONE 500 MG/1
500 TABLET, FILM COATED ORAL 2 TIMES DAILY
Qty: 60 TABLET | Refills: 4 | Status: SHIPPED | OUTPATIENT
Start: 2019-05-14 | End: 2019-06-13

## 2019-05-14 RX ORDER — LEFLUNOMIDE 20 MG/1
TABLET ORAL
Qty: 90 TABLET | Refills: 2 | Status: SHIPPED | OUTPATIENT
Start: 2019-05-14 | End: 2019-10-17 | Stop reason: SDUPTHER

## 2019-05-14 RX ORDER — HYDROXYCHLOROQUINE SULFATE 200 MG/1
200 TABLET, FILM COATED ORAL 2 TIMES DAILY
Qty: 60 TABLET | Refills: 6 | Status: SHIPPED | OUTPATIENT
Start: 2019-05-14 | End: 2019-10-17 | Stop reason: SDUPTHER

## 2019-05-14 RX ORDER — TRAMADOL HYDROCHLORIDE 50 MG/1
50 TABLET ORAL EVERY 12 HOURS PRN
Qty: 60 TABLET | Refills: 3 | Status: SHIPPED | OUTPATIENT
Start: 2019-05-14 | End: 2019-10-17 | Stop reason: SDUPTHER
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
## 2019-05-16 ENCOUNTER — PATIENT MESSAGE (OUTPATIENT)
Dept: RHEUMATOLOGY | Facility: CLINIC | Age: 54
End: 2019-05-16

## 2019-08-12 DIAGNOSIS — E11.40 TYPE 2 DIABETES MELLITUS WITH DIABETIC NEUROPATHY, WITHOUT LONG-TERM CURRENT USE OF INSULIN: ICD-10-CM

## 2019-08-12 RX ORDER — LEVOTHYROXINE SODIUM 175 UG/1
TABLET ORAL
Qty: 90 TABLET | Refills: 3 | Status: SHIPPED | OUTPATIENT
Start: 2019-08-12 | End: 2020-08-12

## 2019-08-12 RX ORDER — PRAVASTATIN SODIUM 10 MG/1
TABLET ORAL
Qty: 30 TABLET | Refills: 3 | Status: SHIPPED | OUTPATIENT
Start: 2019-08-12 | End: 2020-04-15

## 2019-09-06 ENCOUNTER — CLINICAL SUPPORT (OUTPATIENT)
Dept: FAMILY MEDICINE | Facility: CLINIC | Age: 54
End: 2019-09-06
Attending: FAMILY MEDICINE
Payer: COMMERCIAL

## 2019-09-06 DIAGNOSIS — E11.9 TYPE 2 DIABETES MELLITUS WITHOUT COMPLICATION, UNSPECIFIED WHETHER LONG TERM INSULIN USE: ICD-10-CM

## 2019-09-06 PROCEDURE — 99999 PR PBB SHADOW E&M-EST. PATIENT-LVL I: CPT | Mod: PBBFAC,,,

## 2019-09-06 PROCEDURE — 92250 DIABETIC EYE SCREENING PHOTO: ICD-10-PCS | Mod: TC,S$GLB,, | Performed by: FAMILY MEDICINE

## 2019-09-06 PROCEDURE — 92250 FUNDUS PHOTOGRAPHY W/I&R: CPT | Mod: 26,S$GLB,, | Performed by: OPHTHALMOLOGY

## 2019-09-06 PROCEDURE — 92250 FUNDUS PHOTOGRAPHY W/I&R: CPT | Mod: TC,S$GLB,, | Performed by: FAMILY MEDICINE

## 2019-09-06 PROCEDURE — 92250 DIABETIC EYE SCREENING PHOTO: ICD-10-PCS | Mod: 26,S$GLB,, | Performed by: OPHTHALMOLOGY

## 2019-09-06 PROCEDURE — 99999 PR PBB SHADOW E&M-EST. PATIENT-LVL I: ICD-10-PCS | Mod: PBBFAC,,,

## 2019-09-06 NOTE — PROGRESS NOTES
Domi Leary is a 54 y.o. female here for a diabetic eye screening with non-dilated fundus photos per Dr. Nicole.    Patient cooperative?: Yes  Small pupils?: Yes, OS only  Last eye exam: 2/15/17    For exam results, see Encounter Report.

## 2019-09-14 ENCOUNTER — NURSE TRIAGE (OUTPATIENT)
Dept: ADMINISTRATIVE | Facility: CLINIC | Age: 54
End: 2019-09-14

## 2019-09-14 NOTE — TELEPHONE ENCOUNTER
Reason for Disposition   [1] Red or very tender (to touch) area AND [2] started over 24 hours after the sting    Additional Information   Negative: [1] Life-threatening reaction (anaphylaxis) in the past to sting AND [2] < 2 hours since sting   Negative: Attacked by swarm of bees now   Negative: Passed out (i.e., lost consciousness, collapsed and was not responding)   Negative: Wheezing, stridor, or difficulty breathing   Negative: [1] Hoarseness or cough AND [2] sudden onset following sting   Negative: [1] Tightness in the throat or chest AND [2] sudden onset following sting   Negative: [1] Swollen tongue or difficulty swallowing AND [2] sudden onset following sting   Negative: Sounds like a life-threatening emergency to the triager   Negative: Not a bee, wasp, hornet, or yellow jacket sting   Negative: [1] Widespread hives, itching or facial swelling AND [2] started within 2 hours of sting (Exception: only at site of sting)   Negative: [1] Vomiting or abdominal cramps AND [2] started within 2 hours of sting   Negative: [1] Gave epinephrine shot AND [2] no symptoms now   Negative: Sting inside the mouth   Negative: Sting on eyeball (e.g., cornea)   Negative: Patient sounds very sick or weak to the triager   Negative: More than 50 stings   Negative: [1] Fever AND [2] red area   Negative: [1] Fever AND [2] area is very tender to touch   Negative: [1] Red streak or red line AND [2] length > 2 inches (5 cm)    Protocols used: BEE OR YELLOW JACKET STING-A-AH  Stung yest by yellow jackets- five stings on hand . 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 Hx RA. Stung on side of face and back on head also. rec Local UC due to pts hx and family concern. Offered local UC options. call back with questions

## 2019-09-16 ENCOUNTER — TELEPHONE (OUTPATIENT)
Dept: FAMILY MEDICINE | Facility: CLINIC | Age: 54
End: 2019-09-16

## 2019-09-16 NOTE — TELEPHONE ENCOUNTER
Lm advising pt to call back to discuss yellow jacket stings and a status of how she is feeling  If she was seen by UC?

## 2019-09-16 NOTE — TELEPHONE ENCOUNTER
----- Message from Leanne Padgett sent at 9/16/2019 10:07 AM CDT -----  Contact: self  Type:  Patient Returning Call    Who Called:  Patient   Who Left Message for Patient:  Nurse   Does the patient know what this is regarding?:    Best Call Back Number:  [phone] (home)     Additional Information:

## 2019-09-16 NOTE — TELEPHONE ENCOUNTER
Spoke with pt, states she is back to normal, states swelling is back to normal, pt states she did not go to UC, states she treated at home. No further complaints from bee stings

## 2019-10-17 ENCOUNTER — OFFICE VISIT (OUTPATIENT)
Dept: RHEUMATOLOGY | Facility: CLINIC | Age: 54
End: 2019-10-17
Payer: COMMERCIAL

## 2019-10-17 VITALS — BODY MASS INDEX: 34.61 KG/M2 | HEIGHT: 63 IN | WEIGHT: 195.31 LBS

## 2019-10-17 DIAGNOSIS — M05.79 SEROPOSITIVE RHEUMATOID ARTHRITIS OF MULTIPLE SITES: ICD-10-CM

## 2019-10-17 DIAGNOSIS — M25.441 FINGER JOINT SWELLING, RIGHT: Primary | ICD-10-CM

## 2019-10-17 PROCEDURE — 3008F BODY MASS INDEX DOCD: CPT | Mod: CPTII,S$GLB,, | Performed by: INTERNAL MEDICINE

## 2019-10-17 PROCEDURE — 3008F PR BODY MASS INDEX (BMI) DOCUMENTED: ICD-10-PCS | Mod: CPTII,S$GLB,, | Performed by: INTERNAL MEDICINE

## 2019-10-17 PROCEDURE — 99999 PR PBB SHADOW E&M-EST. PATIENT-LVL III: CPT | Mod: PBBFAC,,, | Performed by: INTERNAL MEDICINE

## 2019-10-17 PROCEDURE — 99999 PR PBB SHADOW E&M-EST. PATIENT-LVL III: ICD-10-PCS | Mod: PBBFAC,,, | Performed by: INTERNAL MEDICINE

## 2019-10-17 PROCEDURE — 99214 OFFICE O/P EST MOD 30 MIN: CPT | Mod: 25,S$GLB,, | Performed by: INTERNAL MEDICINE

## 2019-10-17 PROCEDURE — 99214 PR OFFICE/OUTPT VISIT, EST, LEVL IV, 30-39 MIN: ICD-10-PCS | Mod: 25,S$GLB,, | Performed by: INTERNAL MEDICINE

## 2019-10-17 PROCEDURE — 20600 SMALL JOINT ASPIRATION/INJECTION: R INDEX MCP: ICD-10-PCS | Mod: RT,S$GLB,, | Performed by: INTERNAL MEDICINE

## 2019-10-17 PROCEDURE — 20600 DRAIN/INJ JOINT/BURSA W/O US: CPT | Mod: RT,S$GLB,, | Performed by: INTERNAL MEDICINE

## 2019-10-17 RX ORDER — LEFLUNOMIDE 20 MG/1
TABLET ORAL
Qty: 90 TABLET | Refills: 2 | Status: SHIPPED | OUTPATIENT
Start: 2019-10-17 | End: 2021-03-09 | Stop reason: SDUPTHER

## 2019-10-17 RX ORDER — TRAMADOL HYDROCHLORIDE 50 MG/1
50 TABLET ORAL EVERY 12 HOURS PRN
Qty: 60 TABLET | Refills: 3 | Status: SHIPPED | OUTPATIENT
Start: 2019-10-17 | End: 2021-05-10 | Stop reason: SDUPTHER

## 2019-10-17 RX ORDER — HYDROXYCHLOROQUINE SULFATE 200 MG/1
200 TABLET, FILM COATED ORAL 2 TIMES DAILY
Qty: 60 TABLET | Refills: 6 | Status: SHIPPED | OUTPATIENT
Start: 2019-10-17 | End: 2020-05-12

## 2019-10-17 RX ADMIN — METHYLPREDNISOLONE ACETATE 40 MG: 40 INJECTION, SUSPENSION INTRA-ARTICULAR; INTRALESIONAL; INTRAMUSCULAR; SOFT TISSUE at 01:10

## 2019-10-17 ASSESSMENT — ROUTINE ASSESSMENT OF PATIENT INDEX DATA (RAPID3)
PAIN SCORE: 7
PSYCHOLOGICAL DISTRESS SCORE: 3.3
PATIENT GLOBAL ASSESSMENT SCORE: 4
MDHAQ FUNCTION SCORE: .5
TOTAL RAPID3 SCORE: 4.22
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
## 2019-10-17 NOTE — PROCEDURES
Small Joint Aspiration/Injection: R index MCP  Date/Time: 10/17/2019 1:00 PM  Performed by: Lu Chakraborty DO  Authorized by: Lu Chakraborty, DO     Consent Done?:  Yes (Verbal)  Indications:  Joint swelling and pain  Site marked: The procedure site was marked    Timeout: Prior to procedure the correct patient, procedure, and site was verified      Location:  Index finger  Site:  R index MCP  Prep: Patient was prepped and draped in usual sterile fashion    Medications:  40 mg methylPREDNISolone acetate 40 mg/mL  Patient tolerance:  Patient tolerated the procedure well with no immediate complications     Additional Comments: Patient informed of the risks, benefits, side effects of corticosteroid injections including but not limited to skin hypopigmentation, atrophy, ineffectiveness and infection.

## 2019-10-27 RX ORDER — METHYLPREDNISOLONE ACETATE 40 MG/ML
40 INJECTION, SUSPENSION INTRA-ARTICULAR; INTRALESIONAL; INTRAMUSCULAR; SOFT TISSUE
Status: DISCONTINUED | OUTPATIENT
Start: 2019-10-17 | End: 2019-10-27 | Stop reason: HOSPADM

## 2019-10-29 ENCOUNTER — PATIENT OUTREACH (OUTPATIENT)
Dept: ADMINISTRATIVE | Facility: HOSPITAL | Age: 54
End: 2019-10-29

## 2019-10-31 ENCOUNTER — TELEPHONE (OUTPATIENT)
Dept: FAMILY MEDICINE | Facility: CLINIC | Age: 54
End: 2019-10-31

## 2019-10-31 NOTE — TELEPHONE ENCOUNTER
Rec'd PA request from [facility](fax [phone]) for Jardiance. Pt's ID # [id number].  Initiated PA via [employer]  Key [id number].    Awaiting determination response.  Need to check on this. Encounter not closed until approved or denied.

## 2019-10-31 NOTE — TELEPHONE ENCOUNTER
Rec'd fax from [employer] that this request could not be processed because they could not locate this pt in their system. Fax sent back to [facility] asking them to make sure they update insurance in their system before sending PA request on a pt.

## 2019-11-18 ENCOUNTER — PATIENT OUTREACH (OUTPATIENT)
Dept: ADMINISTRATIVE | Facility: HOSPITAL | Age: 54
End: 2019-11-18

## 2019-11-18 DIAGNOSIS — Z12.31 ENCOUNTER FOR SCREENING MAMMOGRAM FOR BREAST CANCER: Primary | ICD-10-CM

## 2019-12-23 ENCOUNTER — PATIENT OUTREACH (OUTPATIENT)
Dept: ADMINISTRATIVE | Facility: HOSPITAL | Age: 54
End: 2019-12-23

## 2020-01-14 ENCOUNTER — PATIENT OUTREACH (OUTPATIENT)
Dept: ADMINISTRATIVE | Facility: HOSPITAL | Age: 55
End: 2020-01-14

## 2020-02-03 ENCOUNTER — PATIENT OUTREACH (OUTPATIENT)
Dept: ADMINISTRATIVE | Facility: HOSPITAL | Age: 55
End: 2020-02-03

## 2020-02-07 DIAGNOSIS — E11.9 TYPE 2 DIABETES MELLITUS WITHOUT COMPLICATION: ICD-10-CM

## 2020-02-07 DIAGNOSIS — Z12.11 COLON CANCER SCREENING: ICD-10-CM

## 2020-02-14 ENCOUNTER — PATIENT OUTREACH (OUTPATIENT)
Dept: ADMINISTRATIVE | Facility: OTHER | Age: 55
End: 2020-02-14

## 2020-04-02 ENCOUNTER — PATIENT OUTREACH (OUTPATIENT)
Dept: ADMINISTRATIVE | Facility: HOSPITAL | Age: 55
End: 2020-04-02

## 2020-04-08 ENCOUNTER — PATIENT OUTREACH (OUTPATIENT)
Dept: ADMINISTRATIVE | Facility: HOSPITAL | Age: 55
End: 2020-04-08

## 2020-04-08 NOTE — PROGRESS NOTES
Hemoglobin A1C non-compliant report.    Chart review completed 04/08/2020.  Care Everywhere updates requested and reviewed.  Immunizations reconciled. Media reviewed.     Last A1C 11/21/2018 Needs labs and follow up appointment.  Not in DIS, Not in [facility].    Health Maintenance Due   Topic Date Due    Shingles Vaccine (1 of 2) 03/02/2016    Colonoscopy  03/02/2016    Mammogram  10/25/2018    Hemoglobin A1c  05/21/2019    Lipid Panel  08/14/2019    Influenza Vaccine (1) 09/01/2019    Pap Smear with HPV Cotest  10/24/2019    Urine Microalbumin  11/21/2019    Foot Exam  04/10/2020

## 2020-04-15 DIAGNOSIS — E11.40 TYPE 2 DIABETES MELLITUS WITH DIABETIC NEUROPATHY, WITHOUT LONG-TERM CURRENT USE OF INSULIN: ICD-10-CM

## 2020-04-15 RX ORDER — PRAVASTATIN SODIUM 10 MG/1
TABLET ORAL
Qty: 30 TABLET | Refills: 1 | Status: SHIPPED | OUTPATIENT
Start: 2020-04-15 | End: 2021-02-18 | Stop reason: SDUPTHER

## 2020-04-17 NOTE — TELEPHONE ENCOUNTER
Called patient in regards to rx refill notification and scheduling follow up appts. No answer. LVM to return phone call to clinic.

## 2020-05-06 ENCOUNTER — PATIENT MESSAGE (OUTPATIENT)
Dept: ADMINISTRATIVE | Facility: HOSPITAL | Age: 55
End: 2020-05-06

## 2020-05-10 DIAGNOSIS — M05.79 SEROPOSITIVE RHEUMATOID ARTHRITIS OF MULTIPLE SITES: ICD-10-CM

## 2020-05-12 RX ORDER — HYDROXYCHLOROQUINE SULFATE 200 MG/1
TABLET, FILM COATED ORAL
Qty: 60 TABLET | Refills: 6 | Status: SHIPPED | OUTPATIENT
Start: 2020-05-12 | End: 2021-02-18 | Stop reason: SDUPTHER

## 2020-06-18 ENCOUNTER — PATIENT OUTREACH (OUTPATIENT)
Dept: ADMINISTRATIVE | Facility: HOSPITAL | Age: 55
End: 2020-06-18

## 2020-08-12 DIAGNOSIS — E03.4 HYPOTHYROIDISM DUE TO ACQUIRED ATROPHY OF THYROID: ICD-10-CM

## 2020-08-12 DIAGNOSIS — E11.40 TYPE 2 DIABETES MELLITUS WITH DIABETIC NEUROPATHY, WITHOUT LONG-TERM CURRENT USE OF INSULIN: Primary | ICD-10-CM

## 2020-08-12 RX ORDER — LEVOTHYROXINE SODIUM 175 UG/1
TABLET ORAL
Qty: 90 TABLET | Refills: 0 | Status: SHIPPED | OUTPATIENT
Start: 2020-08-12 | End: 2020-11-23

## 2020-08-13 NOTE — TELEPHONE ENCOUNTER
Called patient in regards to letting her know her medications were refilled and sent to their preferred pharmacy and scheduling labs and a follow up appt.  No answer. Left voice mail notification.

## 2020-08-13 NOTE — TELEPHONE ENCOUNTER
2nd attempt to contact pt in regards to scheduling a follow up appt. No answer. LVM to return phone call to clinic.

## 2020-08-25 ENCOUNTER — PATIENT OUTREACH (OUTPATIENT)
Dept: ADMINISTRATIVE | Facility: HOSPITAL | Age: 55
End: 2020-08-25

## 2020-09-03 ENCOUNTER — PATIENT OUTREACH (OUTPATIENT)
Dept: ADMINISTRATIVE | Facility: HOSPITAL | Age: 55
End: 2020-09-03

## 2020-09-03 NOTE — LETTER
September 3, 2020    Domi Olson Gibran  [address]  [place] [address]             [facility]  [address]  [facility] [address]  Phone: [phone] Dear Mrs. Leary:    Our records show you are due for colon cancer screening.  If you have already had your screening, or you have made an appointment for your screening, congratulations!  Youre on the road to good health. If you havent signed up for a colorectal screening please accept this invitation to be screened.      According to the American Cancer Society, colon cancer is the third most common cancer for people in the [place].  A simple screening test Fit Kit - done in your own home - can help find colon cancer at an early stage when it can be treated, even before any signs or symptoms develop.     Testing for blood in your stool (feces or bowel movement) is the first step. If you have an upcoming visit with your Primary Care Physician you can  a Fit Kit during your visit, or you can  a Fit Kit at your Primary Care Clinic prior to your visit.    If your test results are negative, you wont need testing again for another year.  If results show you need more testing, we will call you with next steps.    Regular colorectal cancer screening is one of the most powerful weapons for preventing coloncancer.     I wish you continued good health!    Sincerely,      Светлана Fowler, Panel Care Coordinator  [facility]  Phone: [phone]  Fax: [phone]

## 2020-09-03 NOTE — PROGRESS NOTES
Non-compliant report chart audits. Chart review completed for HM test overdue (mammograms, Colonoscopies, pap smears, DM labs, and/or EYE EXAMs)  06/18/2020    Care Everywhere and media, updates requested and reviewed.        LMOR, letter mailed.

## 2020-09-11 ENCOUNTER — PATIENT OUTREACH (OUTPATIENT)
Dept: ADMINISTRATIVE | Facility: HOSPITAL | Age: 55
End: 2020-09-11

## 2020-09-11 NOTE — PROGRESS NOTES
Non-compliant GAP report chart review - Chart review completed for the following HM test if overdue  (Mammogram, Colonoscopy, Cervical Cancer Screening,  Diabetic lab testing, and/or Dilated EYE EXAM)  09/11/2020    Care Everywhere and Media reports - updates requested and reviewed.        [facility] and [facility] reviewed.  Pap Smear and/or  LABS      DIS reviewed for test needed.  Mammogram            Health Maintenance Due   Topic Date Due    Shingles Vaccine (1 of 2) 03/02/2016    Colorectal Cancer Screening  03/02/2016    Mammogram  10/24/2018    Hemoglobin A1c  05/21/2019    Lipid Panel  08/14/2019    Cervical Cancer Screening  10/24/2019    Urine Microalbumin  11/21/2019    Foot Exam  04/10/2020    Influenza Vaccine (1) 08/01/2020    Eye Exam  09/06/2020

## 2020-09-11 NOTE — LETTER
September 21, 2020    Domi Leary  [address]  [place] [address]             [facility]  [address]  [facility] [address]  Phone: [phone] Dear Pamela, [facility] committed to your overall health. Currently, we have Anoop Nicole MD listed as your primary care provider. If this is incorrect, please let us know by emailing or contacting our office at [phone] so we can update our records.     Our records show you have not been seen in twelve months by your primary care provider. Please schedule online through your [email or web address]  account or call our office at [phone].   You may also be due for the following test and/or procedures:     Shingles Immunization   Influenza Vaccine   Colon cancer screening   Mammogram   Pap smear   Diabetic lab testing   Annual Dilated Eye Exam     If you already have any of the above scheduled, please disregard this message.  If you have had any of the above done at a non-[facility] facility, please notify us so that we can obtain a copy or bring a copy to your next Primary Care visit.     Your [facility] care team is committed to supporting your overall health. We look forward to seeing you soon and appreciate the opportunity to serve you.       Sincerely,     Anoop Nicole MD and your [facility] Team

## 2020-10-05 ENCOUNTER — PATIENT MESSAGE (OUTPATIENT)
Dept: ADMINISTRATIVE | Facility: HOSPITAL | Age: 55
End: 2020-10-05

## 2020-11-23 RX ORDER — LEVOTHYROXINE SODIUM 175 UG/1
TABLET ORAL
Qty: 90 TABLET | Refills: 0 | Status: SHIPPED | OUTPATIENT
Start: 2020-11-23 | End: 2021-02-19

## 2020-12-01 ENCOUNTER — PATIENT OUTREACH (OUTPATIENT)
Dept: ADMINISTRATIVE | Facility: HOSPITAL | Age: 55
End: 2020-12-01

## 2020-12-01 DIAGNOSIS — Z12.31 ENCOUNTER FOR SCREENING MAMMOGRAM FOR MALIGNANT NEOPLASM OF BREAST: Primary | ICD-10-CM

## 2020-12-01 NOTE — LETTER
December 1, 2020    Domi Olson Gibran  [address]  [place] [address]             [facility]  [address]  [facility] [address]  Phone: [phone] Dear Mrs. Leary:    Your [facility] primary care team is dedicated to assisting you achieve your health goals.  In order to do so, scheduling your routine screenings and tests are key to your good health.  Our records indicate you may be overdue for your mammogram.  Mammography screening can help find breast cancer at an early stage, when it is most likely to be successfully treated.    Anoop Nicole MD has entered your screening mammogram order.  We encourage you to schedule your appointment at any of our [facility] imaging locations.  Please contact our office at the number provided below and we will be more than happy to assist you in scheduling your mammogram.     If you recently had your mammogram outside of [facility], please let your primary care team know so that they can update your health record.  Please send a message to your primary care physician via your [email or web address] account or contact his/her office.     Thank you for letting us care for you.    Sincerely,    Anoop Nicole MD and your Primary Care Team  Oj Cortes LPN Clinical Care Coordinator  [facility]  [facility] [address] [address]  [place] [address]  P: [phone]  F: [phone]

## 2020-12-01 NOTE — PROGRESS NOTES
Non-compliant report chart audits. Chart review completed 12/01/2020 for HM test overdue.    Care Everywhere and media, updates requested and reviewed.

## 2020-12-16 DIAGNOSIS — M05.79 SEROPOSITIVE RHEUMATOID ARTHRITIS OF MULTIPLE SITES: ICD-10-CM

## 2020-12-16 NOTE — TELEPHONE ENCOUNTER
----- Message from Rabianatasha Hdz sent at 12/16/2020  4:35 PM CST -----  Regarding: refill  Contact: pt  Type: Needs Medical Advice    Who Called:      Best Call Back Number:       Additional Information: Requesting a call back from Nurse, regarding a refill for Rx leflunomide (ARAVA) 20 MG Tab has been called in 2 weeks ago pharmacy has been sending request pt is out and needs more refills  and a refill for Rx traMADol (ULTRAM) 50 mg tablet,please call back with advice

## 2020-12-17 RX ORDER — LEFLUNOMIDE 20 MG/1
TABLET ORAL
Qty: 90 TABLET | Refills: 2 | OUTPATIENT
Start: 2020-12-17

## 2020-12-17 RX ORDER — TRAMADOL HYDROCHLORIDE 50 MG/1
50 TABLET ORAL EVERY 12 HOURS PRN
Qty: 60 TABLET | Refills: 3 | OUTPATIENT
Start: 2020-12-17

## 2020-12-22 ENCOUNTER — TELEPHONE (OUTPATIENT)
Dept: RHEUMATOLOGY | Facility: CLINIC | Age: 55
End: 2020-12-22

## 2020-12-22 NOTE — TELEPHONE ENCOUNTER
----- Message from Olya Madrigal sent at 12/22/2020  7:46 AM CST -----  Contact: call  pt [phone]    Type:  Sooner Apoointment Request    Caller is requesting a sooner appointment.  Caller declined first available appointment listed below.  Caller will not accept being placed on the waitlist and is requesting a message be sent to doctor.    Name of Caller: pt  When is the first available appointment?      Several  months  out   Symptoms: ckup  // meds  renewal  Best Call Back Number:call  pt [phone]  Additional Information:    pt is  out of  meds // please call  to  fit  pt in

## 2021-01-04 ENCOUNTER — PATIENT MESSAGE (OUTPATIENT)
Dept: ADMINISTRATIVE | Facility: HOSPITAL | Age: 56
End: 2021-01-04

## 2021-02-10 DIAGNOSIS — Z12.11 COLON CANCER SCREENING: ICD-10-CM

## 2021-02-18 DIAGNOSIS — M05.79 SEROPOSITIVE RHEUMATOID ARTHRITIS OF MULTIPLE SITES: ICD-10-CM

## 2021-02-18 DIAGNOSIS — E11.9 TYPE 2 DIABETES MELLITUS WITHOUT COMPLICATION, WITHOUT LONG-TERM CURRENT USE OF INSULIN: ICD-10-CM

## 2021-02-18 DIAGNOSIS — E11.40 TYPE 2 DIABETES MELLITUS WITH DIABETIC NEUROPATHY, WITHOUT LONG-TERM CURRENT USE OF INSULIN: ICD-10-CM

## 2021-02-19 DIAGNOSIS — M05.79 SEROPOSITIVE RHEUMATOID ARTHRITIS OF MULTIPLE SITES: ICD-10-CM

## 2021-02-19 RX ORDER — GLIMEPIRIDE 4 MG/1
TABLET ORAL
Qty: 180 TABLET | Refills: 0 | Status: SHIPPED | OUTPATIENT
Start: 2021-02-19 | End: 2021-05-21

## 2021-02-19 RX ORDER — METFORMIN HYDROCHLORIDE 500 MG/1
500 TABLET, EXTENDED RELEASE ORAL 2 TIMES DAILY WITH MEALS
Qty: 360 TABLET | Refills: 3 | Status: SHIPPED | OUTPATIENT
Start: 2021-02-19 | End: 2022-03-02

## 2021-02-19 RX ORDER — HYDROXYCHLOROQUINE SULFATE 200 MG/1
TABLET, FILM COATED ORAL
Qty: 60 TABLET | Refills: 6 | Status: SHIPPED | OUTPATIENT
Start: 2021-02-19 | End: 2021-05-10 | Stop reason: SDUPTHER

## 2021-02-19 RX ORDER — LEVOTHYROXINE SODIUM 175 UG/1
TABLET ORAL
Qty: 90 TABLET | Refills: 0 | Status: SHIPPED | OUTPATIENT
Start: 2021-02-19 | End: 2021-05-17

## 2021-02-19 RX ORDER — PRAVASTATIN SODIUM 10 MG/1
10 TABLET ORAL DAILY
Qty: 30 TABLET | Refills: 1 | Status: SHIPPED | OUTPATIENT
Start: 2021-02-19 | End: 2021-04-14

## 2021-02-23 RX ORDER — LEFLUNOMIDE 20 MG/1
TABLET ORAL
Qty: 90 TABLET | Refills: 2 | OUTPATIENT
Start: 2021-02-23

## 2021-03-03 ENCOUNTER — LAB VISIT (OUTPATIENT)
Dept: LAB | Facility: HOSPITAL | Age: 56
End: 2021-03-03
Attending: FAMILY MEDICINE
Payer: COMMERCIAL

## 2021-03-03 DIAGNOSIS — Z79.60 LONG-TERM USE OF IMMUNOSUPPRESSANT MEDICATION: ICD-10-CM

## 2021-03-03 DIAGNOSIS — M05.79 SEROPOSITIVE RHEUMATOID ARTHRITIS OF MULTIPLE SITES: ICD-10-CM

## 2021-03-03 DIAGNOSIS — E11.40 TYPE 2 DIABETES MELLITUS WITH DIABETIC NEUROPATHY, WITHOUT LONG-TERM CURRENT USE OF INSULIN: ICD-10-CM

## 2021-03-03 DIAGNOSIS — E03.4 HYPOTHYROIDISM DUE TO ACQUIRED ATROPHY OF THYROID: ICD-10-CM

## 2021-03-03 LAB
BASOPHILS # BLD AUTO: 0.16 K/UL (ref 0–0.2)
BASOPHILS NFR BLD: 1.6 % (ref 0–1.9)
CRP SERPL-MCNC: 52.2 MG/L (ref 0–8.2)
DIFFERENTIAL METHOD: ABNORMAL
EOSINOPHIL # BLD AUTO: 0.2 K/UL (ref 0–0.5)
EOSINOPHIL NFR BLD: 2.1 % (ref 0–8)
ERYTHROCYTE [DISTWIDTH] IN BLOOD BY AUTOMATED COUNT: 14.5 % (ref 11.5–14.5)
ERYTHROCYTE [SEDIMENTATION RATE] IN BLOOD BY WESTERGREN METHOD: 42 MM/HR (ref 0–36)
HCT VFR BLD AUTO: 45.5 % (ref 37–48.5)
HGB BLD-MCNC: 14.2 G/DL (ref 12–16)
IMM GRANULOCYTES # BLD AUTO: 0.04 K/UL (ref 0–0.04)
IMM GRANULOCYTES NFR BLD AUTO: 0.4 % (ref 0–0.5)
LYMPHOCYTES # BLD AUTO: 3.8 K/UL (ref 1–4.8)
LYMPHOCYTES NFR BLD: 37.8 % (ref 18–48)
MCH RBC QN AUTO: 30.3 PG (ref 27–31)
MCHC RBC AUTO-ENTMCNC: 31.2 G/DL (ref 32–36)
MCV RBC AUTO: 97 FL (ref 82–98)
MONOCYTES # BLD AUTO: 0.9 K/UL (ref 0.3–1)
MONOCYTES NFR BLD: 9.2 % (ref 4–15)
NEUTROPHILS # BLD AUTO: 4.9 K/UL (ref 1.8–7.7)
NEUTROPHILS NFR BLD: 48.9 % (ref 38–73)
NRBC BLD-RTO: 0 /100 WBC
PLATELET # BLD AUTO: 385 K/UL (ref 150–350)
PMV BLD AUTO: 11.7 FL (ref 9.2–12.9)
RBC # BLD AUTO: 4.69 M/UL (ref 4–5.4)
WBC # BLD AUTO: 10.08 K/UL (ref 3.9–12.7)

## 2021-03-03 PROCEDURE — 85025 COMPLETE CBC W/AUTO DIFF WBC: CPT | Performed by: INTERNAL MEDICINE

## 2021-03-03 PROCEDURE — 36415 COLL VENOUS BLD VENIPUNCTURE: CPT | Mod: PN | Performed by: FAMILY MEDICINE

## 2021-03-03 PROCEDURE — 80053 COMPREHEN METABOLIC PANEL: CPT | Performed by: FAMILY MEDICINE

## 2021-03-03 PROCEDURE — 86140 C-REACTIVE PROTEIN: CPT | Performed by: INTERNAL MEDICINE

## 2021-03-03 PROCEDURE — 80061 LIPID PANEL: CPT | Performed by: FAMILY MEDICINE

## 2021-03-03 PROCEDURE — 83036 HEMOGLOBIN GLYCOSYLATED A1C: CPT | Performed by: FAMILY MEDICINE

## 2021-03-03 PROCEDURE — 84443 ASSAY THYROID STIM HORMONE: CPT | Performed by: FAMILY MEDICINE

## 2021-03-03 PROCEDURE — 85652 RBC SED RATE AUTOMATED: CPT | Performed by: INTERNAL MEDICINE

## 2021-03-03 PROCEDURE — 84439 ASSAY OF FREE THYROXINE: CPT | Performed by: FAMILY MEDICINE

## 2021-03-04 LAB
ALBUMIN SERPL BCP-MCNC: 3.7 G/DL (ref 3.5–5.2)
ALP SERPL-CCNC: 99 U/L (ref 55–135)
ALT SERPL W/O P-5'-P-CCNC: 20 U/L (ref 10–44)
ANION GAP SERPL CALC-SCNC: 12 MMOL/L (ref 8–16)
AST SERPL-CCNC: 21 U/L (ref 10–40)
BILIRUB SERPL-MCNC: 0.4 MG/DL (ref 0.1–1)
BUN SERPL-MCNC: 11 MG/DL (ref 6–20)
CALCIUM SERPL-MCNC: 9.3 MG/DL (ref 8.7–10.5)
CHLORIDE SERPL-SCNC: 102 MMOL/L (ref 95–110)
CHOLEST SERPL-MCNC: 216 MG/DL (ref 120–199)
CHOLEST/HDLC SERPL: 3 {RATIO} (ref 2–5)
CO2 SERPL-SCNC: 28 MMOL/L (ref 23–29)
CREAT SERPL-MCNC: 1 MG/DL (ref 0.5–1.4)
EST. GFR  (AFRICAN AMERICAN): >60 ML/MIN/1.73 M^2
EST. GFR  (NON AFRICAN AMERICAN): >60 ML/MIN/1.73 M^2
ESTIMATED AVG GLUCOSE: ABNORMAL MG/DL (ref 68–131)
GLUCOSE SERPL-MCNC: 198 MG/DL (ref 70–110)
HBA1C MFR BLD: >14 % (ref 4–5.6)
HDLC SERPL-MCNC: 73 MG/DL (ref 40–75)
HDLC SERPL: 33.8 % (ref 20–50)
LDLC SERPL CALC-MCNC: 85.4 MG/DL (ref 63–159)
NONHDLC SERPL-MCNC: 143 MG/DL
POTASSIUM SERPL-SCNC: 4.3 MMOL/L (ref 3.5–5.1)
PROT SERPL-MCNC: 7.6 G/DL (ref 6–8.4)
SODIUM SERPL-SCNC: 142 MMOL/L (ref 136–145)
T4 FREE SERPL-MCNC: 0.97 NG/DL (ref 0.71–1.51)
TRIGL SERPL-MCNC: 288 MG/DL (ref 30–150)
TSH SERPL DL<=0.005 MIU/L-ACNC: 34.32 UIU/ML (ref 0.4–4)

## 2021-03-09 ENCOUNTER — OFFICE VISIT (OUTPATIENT)
Dept: FAMILY MEDICINE | Facility: CLINIC | Age: 56
End: 2021-03-09
Payer: COMMERCIAL

## 2021-03-09 VITALS
SYSTOLIC BLOOD PRESSURE: 136 MMHG | HEART RATE: 88 BPM | HEIGHT: 63 IN | BODY MASS INDEX: 34.43 KG/M2 | WEIGHT: 194.31 LBS | DIASTOLIC BLOOD PRESSURE: 84 MMHG | TEMPERATURE: 98 F

## 2021-03-09 DIAGNOSIS — I10 ESSENTIAL HYPERTENSION: ICD-10-CM

## 2021-03-09 DIAGNOSIS — M65.4 DE QUERVAIN'S TENOSYNOVITIS, RIGHT: ICD-10-CM

## 2021-03-09 DIAGNOSIS — E11.40 TYPE 2 DIABETES MELLITUS WITH DIABETIC NEUROPATHY, WITHOUT LONG-TERM CURRENT USE OF INSULIN: Primary | ICD-10-CM

## 2021-03-09 DIAGNOSIS — Z12.11 COLON CANCER SCREENING: ICD-10-CM

## 2021-03-09 DIAGNOSIS — M05.79 SEROPOSITIVE RHEUMATOID ARTHRITIS OF MULTIPLE SITES: ICD-10-CM

## 2021-03-09 DIAGNOSIS — E03.4 HYPOTHYROIDISM DUE TO ACQUIRED ATROPHY OF THYROID: ICD-10-CM

## 2021-03-09 PROCEDURE — 99214 OFFICE O/P EST MOD 30 MIN: CPT | Mod: S$GLB,,, | Performed by: FAMILY MEDICINE

## 2021-03-09 PROCEDURE — 3079F DIAST BP 80-89 MM HG: CPT | Mod: CPTII,S$GLB,, | Performed by: FAMILY MEDICINE

## 2021-03-09 PROCEDURE — 3075F PR MOST RECENT SYSTOLIC BLOOD PRESS GE 130-139MM HG: ICD-10-PCS | Mod: CPTII,S$GLB,, | Performed by: FAMILY MEDICINE

## 2021-03-09 PROCEDURE — 3008F BODY MASS INDEX DOCD: CPT | Mod: CPTII,S$GLB,, | Performed by: FAMILY MEDICINE

## 2021-03-09 PROCEDURE — 3079F PR MOST RECENT DIASTOLIC BLOOD PRESSURE 80-89 MM HG: ICD-10-PCS | Mod: CPTII,S$GLB,, | Performed by: FAMILY MEDICINE

## 2021-03-09 PROCEDURE — 99214 PR OFFICE/OUTPT VISIT, EST, LEVL IV, 30-39 MIN: ICD-10-PCS | Mod: S$GLB,,, | Performed by: FAMILY MEDICINE

## 2021-03-09 PROCEDURE — 3075F SYST BP GE 130 - 139MM HG: CPT | Mod: CPTII,S$GLB,, | Performed by: FAMILY MEDICINE

## 2021-03-09 PROCEDURE — 3008F PR BODY MASS INDEX (BMI) DOCUMENTED: ICD-10-PCS | Mod: CPTII,S$GLB,, | Performed by: FAMILY MEDICINE

## 2021-03-09 RX ORDER — LEFLUNOMIDE 20 MG/1
TABLET ORAL
Qty: 90 TABLET | Refills: 0 | Status: SHIPPED | OUTPATIENT
Start: 2021-03-09 | End: 2021-05-10 | Stop reason: SDUPTHER

## 2021-04-05 ENCOUNTER — PATIENT MESSAGE (OUTPATIENT)
Dept: ADMINISTRATIVE | Facility: HOSPITAL | Age: 56
End: 2021-04-05

## 2021-04-06 ENCOUNTER — PATIENT MESSAGE (OUTPATIENT)
Dept: ADMINISTRATIVE | Facility: HOSPITAL | Age: 56
End: 2021-04-06

## 2021-04-14 DIAGNOSIS — E11.40 TYPE 2 DIABETES MELLITUS WITH DIABETIC NEUROPATHY, WITHOUT LONG-TERM CURRENT USE OF INSULIN: ICD-10-CM

## 2021-04-14 RX ORDER — PRAVASTATIN SODIUM 10 MG/1
TABLET ORAL
Qty: 30 TABLET | Refills: 3 | Status: SHIPPED | OUTPATIENT
Start: 2021-04-14 | End: 2021-08-12

## 2021-05-06 ENCOUNTER — LAB VISIT (OUTPATIENT)
Dept: LAB | Facility: HOSPITAL | Age: 56
End: 2021-05-06
Attending: FAMILY MEDICINE
Payer: COMMERCIAL

## 2021-05-06 DIAGNOSIS — E03.4 HYPOTHYROIDISM DUE TO ACQUIRED ATROPHY OF THYROID: ICD-10-CM

## 2021-05-06 DIAGNOSIS — E11.40 TYPE 2 DIABETES MELLITUS WITH DIABETIC NEUROPATHY, WITHOUT LONG-TERM CURRENT USE OF INSULIN: ICD-10-CM

## 2021-05-06 LAB
ESTIMATED AVG GLUCOSE: 252 MG/DL (ref 68–131)
HBA1C MFR BLD: 10.4 % (ref 4–5.6)

## 2021-05-06 PROCEDURE — 83036 HEMOGLOBIN GLYCOSYLATED A1C: CPT | Performed by: FAMILY MEDICINE

## 2021-05-06 PROCEDURE — 36415 COLL VENOUS BLD VENIPUNCTURE: CPT | Mod: PN | Performed by: FAMILY MEDICINE

## 2021-05-06 PROCEDURE — 84443 ASSAY THYROID STIM HORMONE: CPT | Performed by: FAMILY MEDICINE

## 2021-05-07 LAB — TSH SERPL DL<=0.005 MIU/L-ACNC: 3.51 UIU/ML (ref 0.4–4)

## 2021-05-08 ENCOUNTER — PATIENT OUTREACH (OUTPATIENT)
Dept: ADMINISTRATIVE | Facility: OTHER | Age: 56
End: 2021-05-08

## 2021-05-10 ENCOUNTER — OFFICE VISIT (OUTPATIENT)
Dept: RHEUMATOLOGY | Facility: CLINIC | Age: 56
End: 2021-05-10
Payer: COMMERCIAL

## 2021-05-10 VITALS
BODY MASS INDEX: 33.89 KG/M2 | WEIGHT: 191.25 LBS | SYSTOLIC BLOOD PRESSURE: 135 MMHG | DIASTOLIC BLOOD PRESSURE: 76 MMHG | HEART RATE: 98 BPM | HEIGHT: 63 IN

## 2021-05-10 DIAGNOSIS — Z79.899 HIGH RISK MEDICATIONS (NOT ANTICOAGULANTS) LONG-TERM USE: Primary | ICD-10-CM

## 2021-05-10 DIAGNOSIS — M65.4 TENOSYNOVITIS, DE QUERVAIN: ICD-10-CM

## 2021-05-10 DIAGNOSIS — D84.9 IMMUNOSUPPRESSION: ICD-10-CM

## 2021-05-10 DIAGNOSIS — M05.79 SEROPOSITIVE RHEUMATOID ARTHRITIS OF MULTIPLE SITES: ICD-10-CM

## 2021-05-10 PROCEDURE — 99999 PR PBB SHADOW E&M-EST. PATIENT-LVL III: ICD-10-PCS | Mod: PBBFAC,,, | Performed by: INTERNAL MEDICINE

## 2021-05-10 PROCEDURE — 99214 PR OFFICE/OUTPT VISIT, EST, LEVL IV, 30-39 MIN: ICD-10-PCS | Mod: 25,S$GLB,, | Performed by: INTERNAL MEDICINE

## 2021-05-10 PROCEDURE — 1125F AMNT PAIN NOTED PAIN PRSNT: CPT | Mod: S$GLB,,, | Performed by: INTERNAL MEDICINE

## 2021-05-10 PROCEDURE — 20550 NJX 1 TENDON SHEATH/LIGAMENT: CPT | Mod: S$GLB,,, | Performed by: INTERNAL MEDICINE

## 2021-05-10 PROCEDURE — 20550 PR INJECT TENDON SHEATH/LIGAMENT: ICD-10-PCS | Mod: S$GLB,,, | Performed by: INTERNAL MEDICINE

## 2021-05-10 PROCEDURE — 99214 OFFICE O/P EST MOD 30 MIN: CPT | Mod: 25,S$GLB,, | Performed by: INTERNAL MEDICINE

## 2021-05-10 PROCEDURE — 99999 PR PBB SHADOW E&M-EST. PATIENT-LVL III: CPT | Mod: PBBFAC,,, | Performed by: INTERNAL MEDICINE

## 2021-05-10 PROCEDURE — 1125F PR PAIN SEVERITY QUANTIFIED, PAIN PRESENT: ICD-10-PCS | Mod: S$GLB,,, | Performed by: INTERNAL MEDICINE

## 2021-05-10 PROCEDURE — 3008F BODY MASS INDEX DOCD: CPT | Mod: CPTII,S$GLB,, | Performed by: INTERNAL MEDICINE

## 2021-05-10 PROCEDURE — 3008F PR BODY MASS INDEX (BMI) DOCUMENTED: ICD-10-PCS | Mod: CPTII,S$GLB,, | Performed by: INTERNAL MEDICINE

## 2021-05-10 RX ORDER — LEFLUNOMIDE 20 MG/1
TABLET ORAL
Qty: 90 TABLET | Refills: 3 | Status: SHIPPED | OUTPATIENT
Start: 2021-05-10 | End: 2021-06-01

## 2021-05-10 RX ORDER — METHYLPREDNISOLONE ACETATE 40 MG/ML
40 INJECTION, SUSPENSION INTRA-ARTICULAR; INTRALESIONAL; INTRAMUSCULAR; SOFT TISSUE
Status: DISCONTINUED | OUTPATIENT
Start: 2021-05-10 | End: 2021-05-10 | Stop reason: HOSPADM

## 2021-05-10 RX ORDER — TRAMADOL HYDROCHLORIDE 50 MG/1
50 TABLET ORAL EVERY 12 HOURS PRN
Qty: 60 TABLET | Refills: 3 | Status: SHIPPED | OUTPATIENT
Start: 2021-05-10 | End: 2022-05-10

## 2021-05-10 RX ORDER — HYDROXYCHLOROQUINE SULFATE 200 MG/1
TABLET, FILM COATED ORAL
Qty: 60 TABLET | Refills: 6 | Status: SHIPPED | OUTPATIENT
Start: 2021-05-10 | End: 2022-01-04

## 2021-05-10 RX ADMIN — METHYLPREDNISOLONE ACETATE 40 MG: 40 INJECTION, SUSPENSION INTRA-ARTICULAR; INTRALESIONAL; INTRAMUSCULAR; SOFT TISSUE at 01:05

## 2021-05-12 ENCOUNTER — OFFICE VISIT (OUTPATIENT)
Dept: FAMILY MEDICINE | Facility: CLINIC | Age: 56
End: 2021-05-12
Payer: COMMERCIAL

## 2021-05-12 VITALS
BODY MASS INDEX: 34.02 KG/M2 | DIASTOLIC BLOOD PRESSURE: 78 MMHG | WEIGHT: 192 LBS | SYSTOLIC BLOOD PRESSURE: 110 MMHG | HEIGHT: 63 IN | HEART RATE: 78 BPM

## 2021-05-12 DIAGNOSIS — I10 ESSENTIAL HYPERTENSION: ICD-10-CM

## 2021-05-12 DIAGNOSIS — E03.4 HYPOTHYROIDISM DUE TO ACQUIRED ATROPHY OF THYROID: ICD-10-CM

## 2021-05-12 DIAGNOSIS — E11.40 TYPE 2 DIABETES MELLITUS WITH DIABETIC NEUROPATHY, WITHOUT LONG-TERM CURRENT USE OF INSULIN: Primary | ICD-10-CM

## 2021-05-12 PROCEDURE — 1125F PR PAIN SEVERITY QUANTIFIED, PAIN PRESENT: ICD-10-PCS | Mod: S$GLB,,, | Performed by: FAMILY MEDICINE

## 2021-05-12 PROCEDURE — 3046F PR MOST RECENT HEMOGLOBIN A1C LEVEL > 9.0%: ICD-10-PCS | Mod: CPTII,S$GLB,, | Performed by: FAMILY MEDICINE

## 2021-05-12 PROCEDURE — 99214 PR OFFICE/OUTPT VISIT, EST, LEVL IV, 30-39 MIN: ICD-10-PCS | Mod: S$GLB,,, | Performed by: FAMILY MEDICINE

## 2021-05-12 PROCEDURE — 99999 PR PBB SHADOW E&M-EST. PATIENT-LVL IV: CPT | Mod: PBBFAC,,, | Performed by: FAMILY MEDICINE

## 2021-05-12 PROCEDURE — 3046F HEMOGLOBIN A1C LEVEL >9.0%: CPT | Mod: CPTII,S$GLB,, | Performed by: FAMILY MEDICINE

## 2021-05-12 PROCEDURE — 1125F AMNT PAIN NOTED PAIN PRSNT: CPT | Mod: S$GLB,,, | Performed by: FAMILY MEDICINE

## 2021-05-12 PROCEDURE — 3008F BODY MASS INDEX DOCD: CPT | Mod: CPTII,S$GLB,, | Performed by: FAMILY MEDICINE

## 2021-05-12 PROCEDURE — 99999 PR PBB SHADOW E&M-EST. PATIENT-LVL IV: ICD-10-PCS | Mod: PBBFAC,,, | Performed by: FAMILY MEDICINE

## 2021-05-12 PROCEDURE — 3008F PR BODY MASS INDEX (BMI) DOCUMENTED: ICD-10-PCS | Mod: CPTII,S$GLB,, | Performed by: FAMILY MEDICINE

## 2021-05-12 PROCEDURE — 99214 OFFICE O/P EST MOD 30 MIN: CPT | Mod: S$GLB,,, | Performed by: FAMILY MEDICINE

## 2021-05-21 RX ORDER — GLIMEPIRIDE 4 MG/1
TABLET ORAL
Qty: 180 TABLET | Refills: 3 | Status: SHIPPED | OUTPATIENT
Start: 2021-05-21 | End: 2022-08-08 | Stop reason: SDUPTHER

## 2021-07-07 ENCOUNTER — PATIENT MESSAGE (OUTPATIENT)
Dept: ADMINISTRATIVE | Facility: HOSPITAL | Age: 56
End: 2021-07-07

## 2021-07-21 DIAGNOSIS — E11.9 TYPE 2 DIABETES MELLITUS WITHOUT COMPLICATION, UNSPECIFIED WHETHER LONG TERM INSULIN USE: ICD-10-CM

## 2021-08-11 ENCOUNTER — LAB VISIT (OUTPATIENT)
Dept: LAB | Facility: HOSPITAL | Age: 56
End: 2021-08-11
Attending: INTERNAL MEDICINE
Payer: COMMERCIAL

## 2021-08-11 DIAGNOSIS — Z79.899 HIGH RISK MEDICATIONS (NOT ANTICOAGULANTS) LONG-TERM USE: ICD-10-CM

## 2021-08-11 DIAGNOSIS — E11.40 TYPE 2 DIABETES MELLITUS WITH DIABETIC NEUROPATHY, WITHOUT LONG-TERM CURRENT USE OF INSULIN: ICD-10-CM

## 2021-08-11 DIAGNOSIS — M05.79 SEROPOSITIVE RHEUMATOID ARTHRITIS OF MULTIPLE SITES: ICD-10-CM

## 2021-08-11 LAB
CHOLEST SERPL-MCNC: 134 MG/DL (ref 120–199)
CHOLEST/HDLC SERPL: 2.4 {RATIO} (ref 2–5)
CRP SERPL-MCNC: 26 MG/L (ref 0–8.2)
ESTIMATED AVG GLUCOSE: 212 MG/DL (ref 68–131)
HBA1C MFR BLD: 9 % (ref 4–5.6)
HDLC SERPL-MCNC: 55 MG/DL (ref 40–75)
HDLC SERPL: 41 % (ref 20–50)
LDLC SERPL CALC-MCNC: 50.2 MG/DL (ref 63–159)
NONHDLC SERPL-MCNC: 79 MG/DL
TRIGL SERPL-MCNC: 144 MG/DL (ref 30–150)

## 2021-08-11 PROCEDURE — 36415 COLL VENOUS BLD VENIPUNCTURE: CPT | Mod: PN | Performed by: INTERNAL MEDICINE

## 2021-08-11 PROCEDURE — 80061 LIPID PANEL: CPT | Performed by: FAMILY MEDICINE

## 2021-08-11 PROCEDURE — 86140 C-REACTIVE PROTEIN: CPT | Performed by: INTERNAL MEDICINE

## 2021-08-11 PROCEDURE — 83036 HEMOGLOBIN GLYCOSYLATED A1C: CPT | Performed by: FAMILY MEDICINE

## 2021-08-15 RX ORDER — LEVOTHYROXINE SODIUM 175 UG/1
TABLET ORAL
Qty: 90 TABLET | Refills: 3 | Status: SHIPPED | OUTPATIENT
Start: 2021-08-15 | End: 2022-08-08 | Stop reason: SDUPTHER

## 2021-09-13 ENCOUNTER — TELEPHONE (OUTPATIENT)
Dept: RHEUMATOLOGY | Facility: CLINIC | Age: 56
End: 2021-09-13

## 2021-09-14 ENCOUNTER — PATIENT MESSAGE (OUTPATIENT)
Dept: RHEUMATOLOGY | Facility: CLINIC | Age: 56
End: 2021-09-14

## 2021-11-10 ENCOUNTER — HOSPITAL ENCOUNTER (OUTPATIENT)
Dept: RADIOLOGY | Facility: HOSPITAL | Age: 56
Discharge: HOME | End: 2021-11-10
Attending: FAMILY MEDICINE
Payer: COMMERCIAL

## 2021-11-10 DIAGNOSIS — Z12.31 ENCOUNTER FOR SCREENING MAMMOGRAM FOR MALIGNANT NEOPLASM OF BREAST: ICD-10-CM

## 2021-11-10 PROCEDURE — 77067 MAMMO DIGITAL SCREENING BILAT WITH TOMO: ICD-10-PCS | Mod: 26,,, | Performed by: RADIOLOGY

## 2021-11-10 PROCEDURE — 77063 MAMMO DIGITAL SCREENING BILAT WITH TOMO: ICD-10-PCS | Mod: 26,,, | Performed by: RADIOLOGY

## 2021-11-10 PROCEDURE — 77063 BREAST TOMOSYNTHESIS BI: CPT | Mod: 26,,, | Performed by: RADIOLOGY

## 2021-11-10 PROCEDURE — 77067 SCR MAMMO BI INCL CAD: CPT | Mod: TC,PO

## 2021-11-10 PROCEDURE — 77067 SCR MAMMO BI INCL CAD: CPT | Mod: 26,,, | Performed by: RADIOLOGY

## 2021-12-01 ENCOUNTER — PATIENT MESSAGE (OUTPATIENT)
Dept: ADMINISTRATIVE | Facility: HOSPITAL | Age: 56
End: 2021-12-01
Payer: COMMERCIAL

## 2021-12-01 ENCOUNTER — PATIENT OUTREACH (OUTPATIENT)
Dept: ADMINISTRATIVE | Facility: HOSPITAL | Age: 56
End: 2021-12-01
Payer: COMMERCIAL

## 2021-12-13 ENCOUNTER — PATIENT OUTREACH (OUTPATIENT)
Dept: ADMINISTRATIVE | Facility: HOSPITAL | Age: 56
End: 2021-12-13
Payer: COMMERCIAL

## 2022-01-01 DIAGNOSIS — M05.79 SEROPOSITIVE RHEUMATOID ARTHRITIS OF MULTIPLE SITES: ICD-10-CM

## 2022-01-04 RX ORDER — HYDROXYCHLOROQUINE SULFATE 200 MG/1
TABLET, FILM COATED ORAL
Qty: 60 TABLET | Refills: 6 | Status: SHIPPED | OUTPATIENT
Start: 2022-01-04 | End: 2022-10-13

## 2022-01-26 ENCOUNTER — PATIENT OUTREACH (OUTPATIENT)
Dept: ADMINISTRATIVE | Facility: HOSPITAL | Age: 57
End: 2022-01-26
Payer: COMMERCIAL

## 2022-01-26 ENCOUNTER — PATIENT MESSAGE (OUTPATIENT)
Dept: ADMINISTRATIVE | Facility: HOSPITAL | Age: 57
End: 2022-01-26
Payer: COMMERCIAL

## 2022-01-26 NOTE — PROGRESS NOTES
Gap report chart review completed for overdue diabetic eye exam  Care everywhere and Media reports reviewed.     Patient Outreach performed.

## 2022-02-17 ENCOUNTER — PATIENT MESSAGE (OUTPATIENT)
Dept: ADMINISTRATIVE | Facility: HOSPITAL | Age: 57
End: 2022-02-17
Payer: COMMERCIAL

## 2022-02-17 ENCOUNTER — PATIENT OUTREACH (OUTPATIENT)
Dept: ADMINISTRATIVE | Facility: HOSPITAL | Age: 57
End: 2022-02-17
Payer: COMMERCIAL

## 2022-02-17 NOTE — LETTER
February 25, 2022    Domi Leary  [address]  [place] [address]             [facility]  [address]  [facility] [address]  Phone: [phone] Dear Pamela, [facility] wants to help patients achieve their health goals. Our records show that you may have been told you have diabetes.     Diabetes is a medical condition that needs to be managed all the time to reduce your risk of things like heart, kidney and eye disease. Your [facility] primary care team wants to be sure you get important tests and screenings done regularly to assure that your health needs are met.  If you believe this diagnosis of diabetes is in error, please let your primary care physician or care team know so that he/she can update your health record.     Our records indicate you may be overdue for the following:     Topic   Eye Exam    Hemoglobin A1C    Foot Exam (Due Soon 3/9/2022)       If you recently had any of the above test done at another facility, please let your primary care provider know so that they can update your health record.  Please send a message to your primary care physician via [email or web address] or contact his/her office at [phone]. If you have a copy of these records, please provide a copy so that we may update your records.  Also, You are welcome to request that the report be faxed to us at ([phone]).       If you have an upcoming scheduled appointment for the above test and/or procedures, please disregard this letter.  Otherwise, please send a message via [email or web address] or by calling [phone] and we will assist in scheduling these appointments for you.        Sincerely,     Anoop Nicole MD and your [facility] Team

## 2022-02-17 NOTE — PROGRESS NOTES
Non-compliant report chart audits HGBA1C.        Care Everywhere and media, updates requested and reviewed.      [facility] and [facility] reviewed for tests needed.    Outreach to patient    Portal message sent    Outreach:  HGBA1C

## 2022-02-23 DIAGNOSIS — E11.9 TYPE 2 DIABETES MELLITUS WITHOUT COMPLICATION: ICD-10-CM

## 2022-02-28 ENCOUNTER — PATIENT MESSAGE (OUTPATIENT)
Dept: ADMINISTRATIVE | Facility: HOSPITAL | Age: 57
End: 2022-02-28
Payer: COMMERCIAL

## 2022-03-02 DIAGNOSIS — E11.9 TYPE 2 DIABETES MELLITUS WITHOUT COMPLICATION, WITHOUT LONG-TERM CURRENT USE OF INSULIN: ICD-10-CM

## 2022-03-02 RX ORDER — METFORMIN HYDROCHLORIDE 500 MG/1
TABLET, EXTENDED RELEASE ORAL
Qty: 360 TABLET | Refills: 0 | Status: SHIPPED | OUTPATIENT
Start: 2022-03-02 | End: 2022-08-08

## 2022-03-02 NOTE — TELEPHONE ENCOUNTER
This Rx Request does not qualify for assessment with the OR   Please review protocol details and the Care Due Message for extra clinical information    Reasons Rx Request may be deferred:  Labs/Vitals overdue  Labs/Vitals abnormal  Patient has been seen in the ED/Hospital since the last PCP visit  Medication is non-delegated  Provider is a non-participating provide    Note composed:11:55 AM 03/02/2022

## 2022-03-09 ENCOUNTER — PATIENT MESSAGE (OUTPATIENT)
Dept: FAMILY MEDICINE | Facility: CLINIC | Age: 57
End: 2022-03-09
Payer: COMMERCIAL

## 2022-03-10 ENCOUNTER — PATIENT MESSAGE (OUTPATIENT)
Dept: RHEUMATOLOGY | Facility: CLINIC | Age: 57
End: 2022-03-10
Payer: COMMERCIAL

## 2022-04-04 ENCOUNTER — PATIENT OUTREACH (OUTPATIENT)
Dept: ADMINISTRATIVE | Facility: HOSPITAL | Age: 57
End: 2022-04-04
Payer: COMMERCIAL

## 2022-04-04 NOTE — PROGRESS NOTES
Non-compliant report chart audits DIABETIC EYE EXAM.   Chart review completed for HM test overdue (mammograms, Colonoscopies, pap smears, DM labs, and/or EYE EXAMs)      Care Everywhere and media, updates requested and reviewed.      RE:  Patient needs diabetic eye exam.    Outreach to patient.        Outreach:  Diabetic eye exam

## 2022-05-09 ENCOUNTER — PATIENT OUTREACH (OUTPATIENT)
Dept: ADMINISTRATIVE | Facility: HOSPITAL | Age: 57
End: 2022-05-09
Payer: COMMERCIAL

## 2022-06-29 ENCOUNTER — LAB VISIT (OUTPATIENT)
Dept: LAB | Facility: HOSPITAL | Age: 57
End: 2022-06-29
Attending: INTERNAL MEDICINE
Payer: COMMERCIAL

## 2022-06-29 ENCOUNTER — PATIENT MESSAGE (OUTPATIENT)
Dept: RHEUMATOLOGY | Facility: CLINIC | Age: 57
End: 2022-06-29
Payer: COMMERCIAL

## 2022-06-29 ENCOUNTER — OFFICE VISIT (OUTPATIENT)
Dept: RHEUMATOLOGY | Facility: CLINIC | Age: 57
End: 2022-06-29
Payer: COMMERCIAL

## 2022-06-29 VITALS
BODY MASS INDEX: 33.48 KG/M2 | HEART RATE: 111 BPM | HEIGHT: 63 IN | DIASTOLIC BLOOD PRESSURE: 85 MMHG | WEIGHT: 188.94 LBS | SYSTOLIC BLOOD PRESSURE: 144 MMHG

## 2022-06-29 DIAGNOSIS — M05.79 SEROPOSITIVE RHEUMATOID ARTHRITIS OF MULTIPLE SITES: Primary | ICD-10-CM

## 2022-06-29 DIAGNOSIS — Z79.899 LONG-TERM USE OF PLAQUENIL: ICD-10-CM

## 2022-06-29 DIAGNOSIS — M05.79 SEROPOSITIVE RHEUMATOID ARTHRITIS OF MULTIPLE SITES: ICD-10-CM

## 2022-06-29 DIAGNOSIS — D84.9 IMMUNOSUPPRESSION: ICD-10-CM

## 2022-06-29 LAB
ALBUMIN SERPL BCP-MCNC: 2.9 G/DL (ref 3.5–5.2)
ALP SERPL-CCNC: 94 U/L (ref 55–135)
ALT SERPL W/O P-5'-P-CCNC: 33 U/L (ref 10–44)
ANION GAP SERPL CALC-SCNC: 9 MMOL/L (ref 8–16)
AST SERPL-CCNC: 20 U/L (ref 10–40)
BASOPHILS # BLD AUTO: 0.18 K/UL (ref 0–0.2)
BASOPHILS NFR BLD: 2.1 % (ref 0–1.9)
BILIRUB SERPL-MCNC: 0.5 MG/DL (ref 0.1–1)
BUN SERPL-MCNC: 7 MG/DL (ref 6–20)
CALCIUM SERPL-MCNC: 9.8 MG/DL (ref 8.7–10.5)
CHLORIDE SERPL-SCNC: 101 MMOL/L (ref 95–110)
CO2 SERPL-SCNC: 29 MMOL/L (ref 23–29)
CREAT SERPL-MCNC: 0.9 MG/DL (ref 0.5–1.4)
CRP SERPL-MCNC: 66.2 MG/L (ref 0–8.2)
DIFFERENTIAL METHOD: ABNORMAL
EOSINOPHIL # BLD AUTO: 0.3 K/UL (ref 0–0.5)
EOSINOPHIL NFR BLD: 3.9 % (ref 0–8)
ERYTHROCYTE [DISTWIDTH] IN BLOOD BY AUTOMATED COUNT: 14.6 % (ref 11.5–14.5)
ERYTHROCYTE [SEDIMENTATION RATE] IN BLOOD BY PHOTOMETRIC METHOD: 15 MM/HR (ref 0–36)
EST. GFR  (AFRICAN AMERICAN): >60 ML/MIN/1.73 M^2
EST. GFR  (NON AFRICAN AMERICAN): >60 ML/MIN/1.73 M^2
GLUCOSE SERPL-MCNC: 313 MG/DL (ref 70–110)
HCT VFR BLD AUTO: 45 % (ref 37–48.5)
HGB BLD-MCNC: 13.6 G/DL (ref 12–16)
IMM GRANULOCYTES # BLD AUTO: 0.03 K/UL (ref 0–0.04)
IMM GRANULOCYTES NFR BLD AUTO: 0.3 % (ref 0–0.5)
LYMPHOCYTES # BLD AUTO: 2.7 K/UL (ref 1–4.8)
LYMPHOCYTES NFR BLD: 31 % (ref 18–48)
MCH RBC QN AUTO: 29 PG (ref 27–31)
MCHC RBC AUTO-ENTMCNC: 30.2 G/DL (ref 32–36)
MCV RBC AUTO: 96 FL (ref 82–98)
MONOCYTES # BLD AUTO: 0.9 K/UL (ref 0.3–1)
MONOCYTES NFR BLD: 10 % (ref 4–15)
NEUTROPHILS # BLD AUTO: 4.5 K/UL (ref 1.8–7.7)
NEUTROPHILS NFR BLD: 52.7 % (ref 38–73)
NRBC BLD-RTO: 0 /100 WBC
PLATELET # BLD AUTO: 356 K/UL (ref 150–450)
PMV BLD AUTO: 12.4 FL (ref 9.2–12.9)
POTASSIUM SERPL-SCNC: 4.3 MMOL/L (ref 3.5–5.1)
PROT SERPL-MCNC: 7.4 G/DL (ref 6–8.4)
RBC # BLD AUTO: 4.69 M/UL (ref 4–5.4)
SODIUM SERPL-SCNC: 139 MMOL/L (ref 136–145)
WBC # BLD AUTO: 8.62 K/UL (ref 3.9–12.7)

## 2022-06-29 PROCEDURE — 80053 COMPREHEN METABOLIC PANEL: CPT | Performed by: INTERNAL MEDICINE

## 2022-06-29 PROCEDURE — 3077F SYST BP >= 140 MM HG: CPT | Mod: CPTII,S$GLB,, | Performed by: INTERNAL MEDICINE

## 2022-06-29 PROCEDURE — 3077F PR MOST RECENT SYSTOLIC BLOOD PRESSURE >= 140 MM HG: ICD-10-PCS | Mod: CPTII,S$GLB,, | Performed by: INTERNAL MEDICINE

## 2022-06-29 PROCEDURE — 3008F PR BODY MASS INDEX (BMI) DOCUMENTED: ICD-10-PCS | Mod: CPTII,S$GLB,, | Performed by: INTERNAL MEDICINE

## 2022-06-29 PROCEDURE — 99214 PR OFFICE/OUTPT VISIT, EST, LEVL IV, 30-39 MIN: ICD-10-PCS | Mod: S$GLB,,, | Performed by: INTERNAL MEDICINE

## 2022-06-29 PROCEDURE — 36415 COLL VENOUS BLD VENIPUNCTURE: CPT | Mod: PO | Performed by: INTERNAL MEDICINE

## 2022-06-29 PROCEDURE — 3079F DIAST BP 80-89 MM HG: CPT | Mod: CPTII,S$GLB,, | Performed by: INTERNAL MEDICINE

## 2022-06-29 PROCEDURE — 99999 PR PBB SHADOW E&M-EST. PATIENT-LVL IV: ICD-10-PCS | Mod: PBBFAC,,, | Performed by: INTERNAL MEDICINE

## 2022-06-29 PROCEDURE — 99214 OFFICE O/P EST MOD 30 MIN: CPT | Mod: S$GLB,,, | Performed by: INTERNAL MEDICINE

## 2022-06-29 PROCEDURE — 99999 PR PBB SHADOW E&M-EST. PATIENT-LVL IV: CPT | Mod: PBBFAC,,, | Performed by: INTERNAL MEDICINE

## 2022-06-29 PROCEDURE — 86140 C-REACTIVE PROTEIN: CPT | Performed by: INTERNAL MEDICINE

## 2022-06-29 PROCEDURE — 3079F PR MOST RECENT DIASTOLIC BLOOD PRESSURE 80-89 MM HG: ICD-10-PCS | Mod: CPTII,S$GLB,, | Performed by: INTERNAL MEDICINE

## 2022-06-29 PROCEDURE — 1159F MED LIST DOCD IN RCRD: CPT | Mod: CPTII,S$GLB,, | Performed by: INTERNAL MEDICINE

## 2022-06-29 PROCEDURE — 85652 RBC SED RATE AUTOMATED: CPT | Performed by: INTERNAL MEDICINE

## 2022-06-29 PROCEDURE — 1159F PR MEDICATION LIST DOCUMENTED IN MEDICAL RECORD: ICD-10-PCS | Mod: CPTII,S$GLB,, | Performed by: INTERNAL MEDICINE

## 2022-06-29 PROCEDURE — 85025 COMPLETE CBC W/AUTO DIFF WBC: CPT | Performed by: INTERNAL MEDICINE

## 2022-06-29 PROCEDURE — 3008F BODY MASS INDEX DOCD: CPT | Mod: CPTII,S$GLB,, | Performed by: INTERNAL MEDICINE

## 2022-06-29 ASSESSMENT — ROUTINE ASSESSMENT OF PATIENT INDEX DATA (RAPID3)
PAIN SCORE: 2
FATIGUE SCORE: 0
TOTAL RAPID3 SCORE: 1.67
MDHAQ FUNCTION SCORE: 0.6
PATIENT GLOBAL ASSESSMENT SCORE: 1
PSYCHOLOGICAL DISTRESS SCORE: 2.2

## 2022-06-29 NOTE — PROGRESS NOTES
Subjective:          Chief Complaint: Domi Leary is a 57 y.o. female who had concerns including Disease Management.    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  Unfortunately by 2009 her arthritis flaring significantly  Seen last in 5/2021    Current:  LFA 20mg will not fill w/o labs but appears her PCP did   HCQ 200mg BID (overdue for eye exam)       Improved swelling  MCP 1st on right and   5th on left (injection 5/2019). And persistent swelling at the left 5th MCP but not painful.      Feet, knees hips. This visit ok.     AM stiffness: <30 min   Worst for her is at night sitting still  Some dry eye, no dry mouth  Previous medications    Enbrel: diffuse psoriasis after starting Enbrel. Resolved with holding.       Component      Latest Ref Rng & Units 1/31/2018 9/4/2009   PIA      Neg <1:160  Negative   CCP Antibodies      <5.0 U/mL  637.7   CRP      0.0 - 8.2 mg/L 12.8 (H) 32.2 (H)   Sed Rate      0 - 20 mm/Hr 12 23 (H)   Rheumatoid Factor      0 - 15 IU/ml  157 (H)     REVIEW OF SYSTEMS:    Review of Systems   Constitutional: Negative for fever, malaise/fatigue and weight loss.   HENT: Negative for sore throat.    Eyes: Negative for double vision, photophobia and redness.   Respiratory: Negative for cough, shortness of breath and wheezing.    Cardiovascular: Negative for chest pain, palpitations and orthopnea.   Gastrointestinal: Negative for abdominal pain, constipation and diarrhea.   Genitourinary: Negative for dysuria, hematuria and urgency.   Musculoskeletal: Positive for joint pain. Negative for back pain and myalgias.   Skin: Negative for rash.   Neurological: Negative for dizziness, tingling, focal weakness and headaches.  "  Endo/Heme/Allergies: Does not bruise/bleed easily.   Psychiatric/Behavioral: Negative for depression, hallucinations and suicidal ideas.               Objective:            Past Medical History:   Diagnosis Date    Diabetes 2012    Hypothyroid 2012    Rheumatoid arthritis 2012     Family History   Problem Relation Age of Onset    Diabetes Maternal Aunt     Breast cancer Maternal Aunt     Cataracts Paternal Grandmother      Social History     Tobacco Use    Smoking status: Former Smoker     Types: Cigarettes     Quit date: 2009     Years since quittin.3    Smokeless tobacco: Never Used   Substance Use Topics    Alcohol use: Yes     Comment: occassionally    Drug use: No         Current Outpatient Medications on File Prior to Visit   Medication Sig Dispense Refill    blood sugar diagnostic Strp To check BG 1 times daily, to use with insurance preferred meter (Patient taking differently: To check BG 1 times daily, One Touch Ultramini) 100 strip 5    empagliflozin (JARDIANCE) 25 mg tablet Take 1 tablet (25 mg total) by mouth once daily. 90 tablet 3    glimepiride (AMARYL) 4 MG tablet TAKE 1 TABLET BY MOUTH TWICE DAILY 180 tablet 3    hydrOXYchloroQUINE (PLAQUENIL) 200 mg tablet TAKE 1 TABLET(200 MG) BY MOUTH TWICE DAILY 60 tablet 6    lancets Misc To check BG 1 times daily, to use with insurance preferred meter 100 each 5    leflunomide (ARAVA) 20 MG Tab TAKE 1 TABLET(20 MG) BY MOUTH EVERY DAY 90 tablet 3    levothyroxine (SYNTHROID, LEVOTHROID) 175 MCG tablet TAKE 1 TABLET(175 MCG) BY MOUTH EVERY DAY 90 tablet 3    metFORMIN (GLUCOPHAGE-XR) 500 MG ER 24hr tablet TAKE 1 TABLET BY MOUTH TWICE DAILY WITH MEALS 360 tablet 0    pen needle, diabetic 31 gauge x 1/4" Ndle 1.8 mg by Misc.(Non-Drug; Combo Route) route once daily. 100 each 11    pravastatin (PRAVACHOL) 10 MG tablet TAKE 1 TABLET(10 MG) BY MOUTH EVERY DAY 90 tablet 0     No current facility-administered medications on " file prior to visit.       Vitals:    06/29/22 1309   BP: (!) 144/85   Pulse: (!) 111       Physical Exam:    Physical Exam  Constitutional:       Appearance: She is well-developed.   HENT:      Head: Normocephalic and atraumatic.   Eyes:      Pupils: Pupils are equal, round, and reactive to light.   Cardiovascular:      Rate and Rhythm: Normal rate and regular rhythm.      Heart sounds: Normal heart sounds.   Pulmonary:      Effort: Pulmonary effort is normal.      Breath sounds: Normal breath sounds.   Musculoskeletal:      Right shoulder: No swelling or tenderness. Normal range of motion.      Left shoulder: No swelling or tenderness. Normal range of motion.      Right elbow: No swelling. Normal range of motion. No tenderness.      Left elbow: No swelling. Normal range of motion. No tenderness.      Right wrist: No swelling or tenderness. Normal range of motion.      Left wrist: No swelling or tenderness. Normal range of motion.      Right hand: Swelling and tenderness present. Normal range of motion.      Left hand: Swelling and tenderness present. Normal range of motion.      Cervical back: Normal range of motion.      Right knee: No swelling. Normal range of motion. No tenderness.      Left knee: No swelling. Normal range of motion. No tenderness.      Right foot: Normal range of motion. No swelling or tenderness.      Left foot: Normal range of motion. No swelling or tenderness.      Comments: Right 1MCP no synovitis   Left 5th MCP synovitis interdigital space. Mild TTP.   Early swan neck deformity.     + finkelstein's right wrist.      Skin:     General: Skin is warm and dry.   Neurological:      Mental Status: She is alert and oriented to person, place, and time.   Psychiatric:         Behavior: Behavior normal.       Component      Latest Ref Rng & Units 8/8/2018   Sed Rate      0 - 20 mm/Hr 15   CRP      0.0 - 8.2 mg/L 10.0 (H)   Rheumatoid Factor      0.0 - 15.0 IU/mL 85.0 (H)   CCP Antibodies      <5.0  U/mL 646.5 (H)             Assessment:       Encounter Diagnoses   Name Primary?    Seropositive rheumatoid arthritis of multiple sites Yes    Immunosuppression     Long-term use of Plaquenil           Plan:        Seropositive rheumatoid arthritis of multiple sites  -     CBC Auto Differential; Standing; Expected date: 06/29/2022  -     Comprehensive Metabolic Panel; Standing; Expected date: 06/29/2022  -     Sedimentation rate; Standing; Expected date: 06/29/2022  -     C-Reactive Protein; Standing; Expected date: 06/29/2022  -     CBC Auto Differential; Standing; Expected date: 06/29/2022  -     Comprehensive Metabolic Panel; Standing; Expected date: 06/29/2022  -     Sedimentation rate; Standing; Expected date: 06/29/2022  -     C-Reactive Protein; Standing; Expected date: 06/29/2022    Immunosuppression  -     CBC Auto Differential; Standing; Expected date: 06/29/2022  -     Comprehensive Metabolic Panel; Standing; Expected date: 06/29/2022  -     Sedimentation rate; Standing; Expected date: 06/29/2022  -     C-Reactive Protein; Standing; Expected date: 06/29/2022  -     CBC Auto Differential; Standing; Expected date: 06/29/2022  -     Comprehensive Metabolic Panel; Standing; Expected date: 06/29/2022  -     Sedimentation rate; Standing; Expected date: 06/29/2022  -     C-Reactive Protein; Standing; Expected date: 06/29/2022    Long-term use of Plaquenil  -     Ambulatory referral/consult to Optometry; Future; Expected date: 07/06/2022         Very pleasant 58 y/o female with hx of sero+ RA and felty's s/p splenectomy   LOV 2019, RTC today only missed approximately 3 months of therapy over 2020 and doing well.    -continue LFA 20mg daily (must have regular labs and f/u  with this medication!)    -add HCQ 200mg BID (must have annual eye exam)   -cotninue Diclofenac     Discussed with patient possibility for biologic therapy but having good response and tolerance with HCQ        Patient would like to avoid  biologics if able.   Follow up in about 6 months (around 12/29/2022).   F/u 4 months      30min consultation with greater than 50% spent in counseling, chart review and coordination of care. All questions answered.

## 2022-06-30 ENCOUNTER — PATIENT OUTREACH (OUTPATIENT)
Dept: ADMINISTRATIVE | Facility: HOSPITAL | Age: 57
End: 2022-06-30
Payer: COMMERCIAL

## 2022-06-30 ENCOUNTER — PATIENT MESSAGE (OUTPATIENT)
Dept: ADMINISTRATIVE | Facility: HOSPITAL | Age: 57
End: 2022-06-30
Payer: COMMERCIAL

## 2022-06-30 NOTE — PROGRESS NOTES
Pt outreach to establish with new PCP, as Dr Nicole has retired    LMOR to return call to clinic    Portal message sent

## 2022-07-01 ENCOUNTER — PATIENT MESSAGE (OUTPATIENT)
Dept: RHEUMATOLOGY | Facility: CLINIC | Age: 57
End: 2022-07-01
Payer: COMMERCIAL

## 2022-07-01 ENCOUNTER — TELEPHONE (OUTPATIENT)
Dept: FAMILY MEDICINE | Facility: CLINIC | Age: 57
End: 2022-07-01
Payer: COMMERCIAL

## 2022-07-01 DIAGNOSIS — M05.79 SEROPOSITIVE RHEUMATOID ARTHRITIS OF MULTIPLE SITES: ICD-10-CM

## 2022-07-01 NOTE — TELEPHONE ENCOUNTER
----- Message from Malinda Tavarez sent at 7/1/2022  3:45 PM CDT -----  .Type:  RX Refill Request    Who Called: PT     Refill or New Rx: REFILL     RX Name and Strength: leflunomide (ARAVA) 20 MG Tab    [facility] [phone]     Pt Call Back Number: [phone]    Additional Information: PT HAS BEEN OUT OF THIS MEDICATION TO LONG PLEASE REFILL THIS TODAY PLEASE     Thank You

## 2022-07-01 NOTE — TELEPHONE ENCOUNTER
----- Message from Malinda Tavarez sent at 7/1/2022  3:45 PM CDT -----  .Type:  RX Refill Request    Who Called: PT     Refill or New Rx: REFILL     RX Name and Strength: leflunomide (ARAVA) 20 MG Tab    [facility] [phone]     Pt Call Back Number: [phone]    Additional Information: PT HAS BEEN OUT OF THIS MEDICATION TO LONG PLEASE REFILL THIS TODAY PLEASE     Thank You

## 2022-07-01 NOTE — TELEPHONE ENCOUNTER
----- Message from Malinda Tavarez sent at 7/1/2022  3:45 PM CDT -----  .Type:  RX Refill Request    Who Called: PT     Refill or New Rx: REFILL     RX Name and Strength: leflunomide (ARAVA) 20 MG Tab    [facility] [phone]     Pt Call Back Number: [phone]    Additional Information: PT HAS BEEN OUT OF THIS MEDICATION TO LONG PLEASE REFILL THIS TODAY PLEASE     Thank You

## 2022-07-06 RX ORDER — LEFLUNOMIDE 20 MG/1
TABLET ORAL
Qty: 90 TABLET | Refills: 3 | Status: SHIPPED | OUTPATIENT
Start: 2022-07-06 | End: 2023-06-11

## 2022-07-13 ENCOUNTER — PATIENT MESSAGE (OUTPATIENT)
Dept: ADMINISTRATIVE | Facility: HOSPITAL | Age: 57
End: 2022-07-13
Payer: COMMERCIAL

## 2022-07-13 ENCOUNTER — PATIENT OUTREACH (OUTPATIENT)
Dept: ADMINISTRATIVE | Facility: HOSPITAL | Age: 57
End: 2022-07-13
Payer: COMMERCIAL

## 2022-07-13 NOTE — PROGRESS NOTES
2022 Care Everywhere updates requested and reviewed.  Immunizations reconciled. Media reports reviewed.  Duplicate HM overrides and  orders removed.  Overdue HM topic chart audit and/or requested.  Overdue lab testing linked to upcoming lab appointments if applies.  Lab pastora, and [employer] reviewed   Pap Smear and Lab testing     Eye exam scheduled 2022    Health Maintenance Due   Topic Date Due    COVID-19 Vaccine (1) Never done    Shingles Vaccine (1 of 2) Never done    Colorectal Cancer Screening  Never done    Cervical Cancer Screening  10/24/2019    Eye Exam  2020    Pneumococcal Vaccines (Age 0-64) (3 - PPSV23 or PCV20) 10/06/2020    Hemoglobin A1c  2021    Foot Exam  2022    Diabetes Urine Screening  2022

## 2022-07-13 NOTE — LETTER
July 20, 2022    Domi Leary  [address]  [place] [address]             [facility]  [address]  [facility] [address]  Phone: [phone] Dear Mrs. Leary:    We have tried to reach you by mychart unsuccessfully.    [facility] is committed to your overall health.  To help you get the most out of each of your visits, we will review your information to make sure you are up to date on all of your recommended tests and/or procedures.       As a new patient to Ros[facility] Boles MD, we may not have your complete medical records.  After reviewing your chart have found that you may be due for the following:     Health Maintenance Due   Topic    COVID-19 Vaccine     Shingles Vaccine     Colorectal Cancer Screening     Cervical Cancer Screening     Pneumococcal Vaccines     Hemoglobin A1c     Foot Exam     Diabetes Urine Screening       If you have had any of the above done at another facility, please bring the records or information with you so that your record at [facility] will be complete.       If you are currently taking medication, please bring it with you to your appointment for review.     Thank you for letting us care for you,        Your [facility] Team

## 2022-08-08 ENCOUNTER — TELEPHONE (OUTPATIENT)
Dept: FAMILY MEDICINE | Facility: CLINIC | Age: 57
End: 2022-08-08

## 2022-08-08 ENCOUNTER — OFFICE VISIT (OUTPATIENT)
Dept: OPTOMETRY | Facility: CLINIC | Age: 57
End: 2022-08-08
Payer: COMMERCIAL

## 2022-08-08 ENCOUNTER — OFFICE VISIT (OUTPATIENT)
Dept: FAMILY MEDICINE | Facility: CLINIC | Age: 57
End: 2022-08-08
Payer: COMMERCIAL

## 2022-08-08 VITALS
DIASTOLIC BLOOD PRESSURE: 80 MMHG | OXYGEN SATURATION: 95 % | BODY MASS INDEX: 33.2 KG/M2 | HEART RATE: 100 BPM | WEIGHT: 187.38 LBS | HEIGHT: 63 IN | SYSTOLIC BLOOD PRESSURE: 120 MMHG

## 2022-08-08 DIAGNOSIS — Z78.0 MENOPAUSE: ICD-10-CM

## 2022-08-08 DIAGNOSIS — Z13.820 SCREENING FOR OSTEOPOROSIS: ICD-10-CM

## 2022-08-08 DIAGNOSIS — Z11.59 NEED FOR HEPATITIS C SCREENING TEST: ICD-10-CM

## 2022-08-08 DIAGNOSIS — E11.40 TYPE 2 DIABETES MELLITUS WITH DIABETIC NEUROPATHY, WITHOUT LONG-TERM CURRENT USE OF INSULIN: ICD-10-CM

## 2022-08-08 DIAGNOSIS — E66.9 OBESITY (BMI 30-39.9): ICD-10-CM

## 2022-08-08 DIAGNOSIS — H52.4 HYPEROPIA WITH ASTIGMATISM AND PRESBYOPIA, BILATERAL: ICD-10-CM

## 2022-08-08 DIAGNOSIS — M05.79 SEROPOSITIVE RHEUMATOID ARTHRITIS OF MULTIPLE SITES: ICD-10-CM

## 2022-08-08 DIAGNOSIS — E78.2 MIXED HYPERLIPIDEMIA: ICD-10-CM

## 2022-08-08 DIAGNOSIS — Z12.11 COLON CANCER SCREENING: ICD-10-CM

## 2022-08-08 DIAGNOSIS — H52.03 HYPEROPIA WITH ASTIGMATISM AND PRESBYOPIA, BILATERAL: ICD-10-CM

## 2022-08-08 DIAGNOSIS — Z00.00 WELLNESS EXAMINATION: Primary | ICD-10-CM

## 2022-08-08 DIAGNOSIS — E11.40 TYPE 2 DIABETES MELLITUS WITH DIABETIC NEUROPATHY, WITHOUT LONG-TERM CURRENT USE OF INSULIN: Primary | ICD-10-CM

## 2022-08-08 DIAGNOSIS — Z90.81 ASPLENIA AFTER SURGICAL PROCEDURE: ICD-10-CM

## 2022-08-08 DIAGNOSIS — Z79.899 LONG-TERM USE OF PLAQUENIL: ICD-10-CM

## 2022-08-08 DIAGNOSIS — R79.89 ABNORMAL CBC: ICD-10-CM

## 2022-08-08 DIAGNOSIS — E11.9 DIABETES MELLITUS TYPE 2 WITHOUT RETINOPATHY: Primary | ICD-10-CM

## 2022-08-08 DIAGNOSIS — I10 ESSENTIAL HYPERTENSION: ICD-10-CM

## 2022-08-08 DIAGNOSIS — H52.203 HYPEROPIA WITH ASTIGMATISM AND PRESBYOPIA, BILATERAL: ICD-10-CM

## 2022-08-08 DIAGNOSIS — H43.813 POSTERIOR VITREOUS DETACHMENT, BILATERAL: ICD-10-CM

## 2022-08-08 DIAGNOSIS — Z13.5 GLAUCOMA SCREENING: ICD-10-CM

## 2022-08-08 DIAGNOSIS — E03.4 HYPOTHYROIDISM DUE TO ACQUIRED ATROPHY OF THYROID: ICD-10-CM

## 2022-08-08 DIAGNOSIS — Z12.31 VISIT FOR SCREENING MAMMOGRAM: ICD-10-CM

## 2022-08-08 PROCEDURE — 99999 PR PBB SHADOW E&M-EST. PATIENT-LVL III: CPT | Mod: PBBFAC,,, | Performed by: OPTOMETRIST

## 2022-08-08 PROCEDURE — 92134 POSTERIOR SEGMENT OCT RETINA (OCULAR COHERENCE TOMOGRAPHY)-BOTH EYES: ICD-10-PCS | Mod: S$GLB,,, | Performed by: OPTOMETRIST

## 2022-08-08 PROCEDURE — 2023F DILAT RTA XM W/O RTNOPTHY: CPT | Mod: CPTII,S$GLB,, | Performed by: OPTOMETRIST

## 2022-08-08 PROCEDURE — 90471 IMMUNIZATION ADMIN: CPT | Mod: S$GLB,,, | Performed by: INTERNAL MEDICINE

## 2022-08-08 PROCEDURE — 3008F PR BODY MASS INDEX (BMI) DOCUMENTED: ICD-10-PCS | Mod: CPTII,S$GLB,, | Performed by: INTERNAL MEDICINE

## 2022-08-08 PROCEDURE — 99999 PR PBB SHADOW E&M-EST. PATIENT-LVL IV: ICD-10-PCS | Mod: PBBFAC,,, | Performed by: INTERNAL MEDICINE

## 2022-08-08 PROCEDURE — 90732 PNEUMOCOCCAL POLYSACCHARIDE VACCINE 23-VALENT =>2YO SQ IM: ICD-10-PCS | Mod: S$GLB,,, | Performed by: INTERNAL MEDICINE

## 2022-08-08 PROCEDURE — 1159F MED LIST DOCD IN RCRD: CPT | Mod: CPTII,S$GLB,, | Performed by: OPTOMETRIST

## 2022-08-08 PROCEDURE — 2023F PR DILATED RETINAL EXAM W/O EVID OF RETINOPATHY: ICD-10-PCS | Mod: CPTII,S$GLB,, | Performed by: OPTOMETRIST

## 2022-08-08 PROCEDURE — 1160F PR REVIEW ALL MEDS BY PRESCRIBER/CLIN PHARMACIST DOCUMENTED: ICD-10-PCS | Mod: CPTII,S$GLB,, | Performed by: INTERNAL MEDICINE

## 2022-08-08 PROCEDURE — 1159F PR MEDICATION LIST DOCUMENTED IN MEDICAL RECORD: ICD-10-PCS | Mod: CPTII,S$GLB,, | Performed by: INTERNAL MEDICINE

## 2022-08-08 PROCEDURE — 99999 PR PBB SHADOW E&M-EST. PATIENT-LVL III: ICD-10-PCS | Mod: PBBFAC,,, | Performed by: OPTOMETRIST

## 2022-08-08 PROCEDURE — 99396 PR PREVENTIVE VISIT,EST,40-64: ICD-10-PCS | Mod: 25,S$GLB,, | Performed by: INTERNAL MEDICINE

## 2022-08-08 PROCEDURE — 1160F RVW MEDS BY RX/DR IN RCRD: CPT | Mod: CPTII,S$GLB,, | Performed by: OPTOMETRIST

## 2022-08-08 PROCEDURE — 90732 PPSV23 VACC 2 YRS+ SUBQ/IM: CPT | Mod: S$GLB,,, | Performed by: INTERNAL MEDICINE

## 2022-08-08 PROCEDURE — 1159F PR MEDICATION LIST DOCUMENTED IN MEDICAL RECORD: ICD-10-PCS | Mod: CPTII,S$GLB,, | Performed by: OPTOMETRIST

## 2022-08-08 PROCEDURE — 92004 COMPRE OPH EXAM NEW PT 1/>: CPT | Mod: S$GLB,,, | Performed by: OPTOMETRIST

## 2022-08-08 PROCEDURE — 99396 PREV VISIT EST AGE 40-64: CPT | Mod: 25,S$GLB,, | Performed by: INTERNAL MEDICINE

## 2022-08-08 PROCEDURE — 92004 PR EYE EXAM, NEW PATIENT,COMPREHESV: ICD-10-PCS | Mod: S$GLB,,, | Performed by: OPTOMETRIST

## 2022-08-08 PROCEDURE — 1160F PR REVIEW ALL MEDS BY PRESCRIBER/CLIN PHARMACIST DOCUMENTED: ICD-10-PCS | Mod: CPTII,S$GLB,, | Performed by: OPTOMETRIST

## 2022-08-08 PROCEDURE — 3079F PR MOST RECENT DIASTOLIC BLOOD PRESSURE 80-89 MM HG: ICD-10-PCS | Mod: CPTII,S$GLB,, | Performed by: INTERNAL MEDICINE

## 2022-08-08 PROCEDURE — 3074F PR MOST RECENT SYSTOLIC BLOOD PRESSURE < 130 MM HG: ICD-10-PCS | Mod: CPTII,S$GLB,, | Performed by: INTERNAL MEDICINE

## 2022-08-08 PROCEDURE — 3079F DIAST BP 80-89 MM HG: CPT | Mod: CPTII,S$GLB,, | Performed by: INTERNAL MEDICINE

## 2022-08-08 PROCEDURE — 1160F RVW MEDS BY RX/DR IN RCRD: CPT | Mod: CPTII,S$GLB,, | Performed by: INTERNAL MEDICINE

## 2022-08-08 PROCEDURE — 99999 PR PBB SHADOW E&M-EST. PATIENT-LVL IV: CPT | Mod: PBBFAC,,, | Performed by: INTERNAL MEDICINE

## 2022-08-08 PROCEDURE — 90471 PNEUMOCOCCAL POLYSACCHARIDE VACCINE 23-VALENT =>2YO SQ IM: ICD-10-PCS | Mod: S$GLB,,, | Performed by: INTERNAL MEDICINE

## 2022-08-08 PROCEDURE — 92134 CPTRZ OPH DX IMG PST SGM RTA: CPT | Mod: S$GLB,,, | Performed by: OPTOMETRIST

## 2022-08-08 PROCEDURE — 1159F MED LIST DOCD IN RCRD: CPT | Mod: CPTII,S$GLB,, | Performed by: INTERNAL MEDICINE

## 2022-08-08 PROCEDURE — 3008F BODY MASS INDEX DOCD: CPT | Mod: CPTII,S$GLB,, | Performed by: INTERNAL MEDICINE

## 2022-08-08 PROCEDURE — 3074F SYST BP LT 130 MM HG: CPT | Mod: CPTII,S$GLB,, | Performed by: INTERNAL MEDICINE

## 2022-08-08 RX ORDER — ONDANSETRON 4 MG/1
4 TABLET, ORALLY DISINTEGRATING ORAL EVERY 6 HOURS PRN
Qty: 30 TABLET | Refills: 0 | Status: SHIPPED | OUTPATIENT
Start: 2022-08-08 | End: 2022-11-07

## 2022-08-08 RX ORDER — PRAVASTATIN SODIUM 10 MG/1
10 TABLET ORAL DAILY
Qty: 90 TABLET | Refills: 2 | Status: SHIPPED | OUTPATIENT
Start: 2022-08-08 | End: 2023-05-10 | Stop reason: SDUPTHER

## 2022-08-08 RX ORDER — GLIMEPIRIDE 4 MG/1
4 TABLET ORAL
Qty: 90 TABLET | Refills: 2 | Status: SHIPPED | OUTPATIENT
Start: 2022-08-08 | End: 2023-05-09

## 2022-08-08 RX ORDER — EMPAGLIFLOZIN, METFORMIN HYDROCHLORIDE 12.5; 1 MG/1; MG/1
1 TABLET, EXTENDED RELEASE ORAL DAILY
Qty: 90 TABLET | Refills: 1 | Status: SHIPPED | OUTPATIENT
Start: 2022-08-08 | End: 2023-02-09

## 2022-08-08 RX ORDER — SEMAGLUTIDE 1.34 MG/ML
INJECTION, SOLUTION SUBCUTANEOUS
Qty: 2 PEN | Refills: 2 | Status: SHIPPED | OUTPATIENT
Start: 2022-08-08 | End: 2022-11-07

## 2022-08-08 RX ORDER — GLIMEPIRIDE 4 MG/1
4 TABLET ORAL
Qty: 60 TABLET | Refills: 2 | Status: SHIPPED | OUTPATIENT
Start: 2022-08-08 | End: 2022-08-08 | Stop reason: SDUPTHER

## 2022-08-08 RX ORDER — LEVOTHYROXINE SODIUM 175 UG/1
175 TABLET ORAL DAILY
Qty: 90 TABLET | Refills: 2 | Status: SHIPPED | OUTPATIENT
Start: 2022-08-08 | End: 2022-11-07 | Stop reason: SDUPTHER

## 2022-08-08 NOTE — PROGRESS NOTES
HPI     Diabetic Eye Exam      Additional comments: DM eye exam              Comments     DLS: 4/3/13    Pt states dist va doing well. Uses +1.75 OTC readers for near. No floaters   or flashes. Was seeing off and on flash of light OS x 1 month ago but then   it stopped. + eyes tear a lot.      LBSL: does not ck daily  Hemoglobin A1C       Date                     Value               Ref Range             Status                08/11/2021               9.0 (H)             4.0 - 5.6 %           Final                 05/06/2021               10.4 (H)            4.0 - 5.6 %           Final                 03/03/2021               >14.0 (H)           4.0 - 5.6 %           Final                    Last edited by MAYELA King, OD on 8/8/2022  8:14 AM. (History)        ROS     Positive for: Eyes    Negative for: Constitutional, Gastrointestinal, Neurological, Skin,   Genitourinary, Musculoskeletal, HENT, Endocrine, Cardiovascular,   Respiratory, Psychiatric, Allergic/Imm, Heme/Lymph    Last edited by MAYELA King, OD on 8/8/2022  8:14 AM. (History)        Assessment /Plan     For exam results, see Encounter Report.    Diabetes mellitus type 2 without retinopathy    Seropositive rheumatoid arthritis of multiple sites  -     Posterior Segment OCT Retina-Both eyes  -     Llamas Visual Field - OU - Extended - Both Eyes; Future    Long-term use of Plaquenil  -     Ambulatory referral/consult to Optometry  -     Posterior Segment OCT Retina-Both eyes  -     Llamas Visual Field - OU - Extended - Both Eyes; Future    Glaucoma screening    Posterior vitreous detachment, bilateral    Hyperopia with astigmatism and presbyopia, bilateral      1. No jim/ retinopathy, no csme, gave Diabetic Retinopathy info, control glucose and BP  Advise annual DFE  2,3. No new ocular manifestations     Plaquenil for 7+ years (pt unsure)  4.59 mg/ kg/ day  Cumulative ~ 1 kg    OCT 8/8/22  Normal macula/ fovea    Scheduled 10-2 white target      4. Not suspect  5. RD precautions given and reviewed. Patient knows to call/ message if any further changes in symptoms occur.  6. Updated but no new refraction today     Discussed and educated patient on current findings /plan.  RTC 1 year, prn if any changes / issues

## 2022-08-08 NOTE — PROGRESS NOTES
"Subjective:       Patient ID: Domi Leary is a 57 y.o. female.    Chief Complaint: Establish Care  Gyn: 2019 Dr Sanchez   MM/21  Dexa: never   Colonoscopy:  years ago at [facility]   Immunizations: Flu: no Tdap:  2013 Pneumovax:  2015 update  Prevnar 13:  2016 Shingles:  Recommend Covid:    Smoker:  Former  Eye: O     HPI    Here to establish previous Dr. Nicole patient    Diabetes type 2: uncontrolled not up todate w labs.    A1c 9.0 //  Rx glimepiride 4 twice daily, Jardiance 25, metformin ER 1000 causes diarrhea.   Hx of Felty syndrome: s/p splenectomy for cytopenia. approx age 43.  // seen Dr Calle past   Hypothyroid:  Rx levo 175  Hyperlipidemia:LDL goal < 70//  Rx pravastatin 10  Rheumatoid arthritis:  Mgmt Rheum Dr Chakraborty // Rx Plaquenil 200, Leflunomide 20   Metabolic syndrome BMI 33 weight 187.       Review of Systems:  Review of Systems   Constitutional: Negative for appetite change.   HENT: Negative for nosebleeds.    Eyes: Negative for pain.   Respiratory: Negative for choking.    Cardiovascular: Negative for chest pain.   Gastrointestinal: Negative for blood in stool.   Genitourinary: Negative for hematuria.   Musculoskeletal: Positive for arthralgias. Negative for joint swelling.   Skin: Negative for pallor.   Neurological: Negative for facial asymmetry.   Hematological: Does not bruise/bleed easily.   Psychiatric/Behavioral: Negative for confusion.       Objective:     Vitals:    22 1140   BP: 120/80   Pulse: 100   SpO2: 95%   Weight: 85 kg (187 lb 6.3 oz)   Height: 5' 3" (1.6 m)          Physical Exam  Vitals reviewed.   Constitutional:       Appearance: Normal appearance.   HENT:      Head: Normocephalic and atraumatic.      Mouth/Throat:      Pharynx: Oropharynx is clear.   Eyes:      Extraocular Movements: Extraocular movements intact.      Conjunctiva/sclera: Conjunctivae normal.      Pupils: Pupils are equal, round, and reactive to light.   Cardiovascular:      Rate and " Rhythm: Normal rate and regular rhythm.      Heart sounds: Normal heart sounds.   Pulmonary:      Effort: Pulmonary effort is normal.      Breath sounds: Normal breath sounds.   Abdominal:      General: Bowel sounds are normal.      Palpations: Abdomen is soft.   Musculoskeletal:         General: Normal range of motion.      Cervical back: Normal range of motion and neck supple.   Skin:     General: Skin is warm and dry.   Neurological:      General: No focal deficit present.      Mental Status: She is alert and oriented to person, place, and time.   Psychiatric:         Mood and Affect: Mood normal.         Medication List with Changes/Refills   New Medications    EMPAGLIFLOZIN-METFORMIN (SYNJARDY XR) 12.5-1,000 MG TBPH    Take 1 tablet by mouth Daily.    ONDANSETRON (ZOFRAN-ODT) 4 MG TBDL    Take 1 tablet (4 mg total) by mouth every 6 (six) hours as needed (nausea).    SEMAGLUTIDE (OZEMPIC) 0.25 MG OR 0.5 MG(2 MG/1.5 ML) PEN INJECTOR    Inject 0.25 mg SQ weekly x 4 wks; then increase to 0.5 mg SQ weekly   Current Medications    BLOOD SUGAR DIAGNOSTIC STRP    To check BG 1 times daily, to use with insurance preferred meter    HYDROXYCHLOROQUINE (PLAQUENIL) 200 MG TABLET    TAKE 1 TABLET(200 MG) BY MOUTH TWICE DAILY    LANCETS MISC    To check BG 1 times daily, to use with insurance preferred meter    LEFLUNOMIDE (ARAVA) 20 MG TAB    TAKE 1 TABLET(20 MG) BY MOUTH EVERY DAY   Changed and/or Refilled Medications    Modified Medication Previous Medication    GLIMEPIRIDE (AMARYL) 4 MG TABLET glimepiride (AMARYL) 4 MG tablet       Take 1 tablet (4 mg total) by mouth daily with breakfast. TAKE 1 TABLET BY MOUTH TWICE DAILY    TAKE 1 TABLET BY MOUTH TWICE DAILY    LEVOTHYROXINE (SYNTHROID, LEVOTHROID) 175 MCG TABLET levothyroxine (SYNTHROID, LEVOTHROID) 175 MCG tablet       Take 1 tablet (175 mcg total) by mouth once daily.    TAKE 1 TABLET(175 MCG) BY MOUTH EVERY DAY    PRAVASTATIN (PRAVACHOL) 10 MG TABLET pravastatin  "(PRAVACHOL) 10 MG tablet       Take 1 tablet (10 mg total) by mouth once daily.    TAKE 1 TABLET(10 MG) BY MOUTH EVERY DAY   Discontinued Medications    EMPAGLIFLOZIN (JARDIANCE) 25 MG TABLET    Take 1 tablet (25 mg total) by mouth once daily.    METFORMIN (GLUCOPHAGE-XR) 500 MG ER 24HR TABLET    TAKE 1 TABLET BY MOUTH TWICE DAILY WITH MEALS    PEN NEEDLE, DIABETIC 31 GAUGE X 1/4" NDLE    1.8 mg by Misc.(Non-Drug; Combo Route) route once daily.       Assessment & Plan:  1. Wellness examination  - Iron and TIBC; Future  - Hemoglobin A1C; Future  - Lipid Panel; Future  - TSH; Future  - Microalbumin/Creatinine Ratio, Urine; Future  - Urinalysis; Future    2. Essential hypertension    3. Type 2 diabetes mellitus with diabetic neuropathy, without long-term current use of insulin  - semaglutide (OZEMPIC) 0.25 mg or 0.5 mg(2 mg/1.5 mL) pen injector; Inject 0.25 mg SQ weekly x 4 wks; then increase to 0.5 mg SQ weekly  Dispense: 2 pen; Refill: 2  - empagliflozin-metformin (SYNJARDY XR) 12.5-1,000 mg TBph; Take 1 tablet by mouth Daily.  Dispense: 90 tablet; Refill: 01  - Hemoglobin A1C; Future  - Microalbumin/Creatinine Ratio, Urine; Future  - Urinalysis; Future  - Hemoglobin A1C; Future  - Comprehensive Metabolic Panel; Future  - pravastatin (PRAVACHOL) 10 MG tablet; Take 1 tablet (10 mg total) by mouth once daily.  Dispense: 90 tablet; Refill: 2    4. Hypothyroidism due to acquired atrophy of thyroid  - TSH; Future  - T4, Free; Future    5. Seropositive rheumatoid arthritis of multiple sites    6. Mixed hyperlipidemia  - Lipid Panel; Future    7. Menopause  - DXA Bone Density Spine And Hip; Future    8. Asplenia after surgical procedure    9. Abnormal CBC  - Iron and TIBC; Future    10. Colon cancer screening  - Case Request Endoscopy: COLONOSCOPY    11. Need for hepatitis C screening test    12. Visit for screening mammogram  - Mammo Digital Screening Bilat w/ Armaan; Future    13. Screening for osteoporosis  - DXA Bone " Density Spine And Hip; Future    14. Obesity (BMI 30-39.9)     Wellness examination  Comments:  Labs today not fasting  Orders:  -     Iron and TIBC; Future; Expected date: 08/08/2022  -     Hemoglobin A1C; Future; Expected date: 08/08/2022  -     Lipid Panel; Future; Expected date: 08/08/2022  -     TSH; Future; Expected date: 08/08/2022  -     Microalbumin/Creatinine Ratio, Urine; Future; Expected date: 08/08/2022  -     Urinalysis; Future; Expected date: 08/08/2022    Essential hypertension    Type 2 diabetes mellitus with diabetic neuropathy, without long-term current use of insulin  -     semaglutide (OZEMPIC) 0.25 mg or 0.5 mg(2 mg/1.5 mL) pen injector; Inject 0.25 mg SQ weekly x 4 wks; then increase to 0.5 mg SQ weekly  Dispense: 2 pen; Refill: 2  -     empagliflozin-metformin (SYNJARDY XR) 12.5-1,000 mg TBph; Take 1 tablet by mouth Daily.  Dispense: 90 tablet; Refill: 01  -     Hemoglobin A1C; Future; Expected date: 08/08/2022  -     Microalbumin/Creatinine Ratio, Urine; Future; Expected date: 08/08/2022  -     Urinalysis; Future; Expected date: 08/08/2022  -     Hemoglobin A1C; Future; Expected date: 08/08/2022  -     Comprehensive Metabolic Panel; Future; Expected date: 08/08/2022  -     pravastatin (PRAVACHOL) 10 MG tablet; Take 1 tablet (10 mg total) by mouth once daily.  Dispense: 90 tablet; Refill: 2    Hypothyroidism due to acquired atrophy of thyroid  -     TSH; Future; Expected date: 08/08/2022  -     T4, Free; Future; Expected date: 08/08/2022    Seropositive rheumatoid arthritis of multiple sites    Mixed hyperlipidemia  -     Lipid Panel; Future; Expected date: 08/08/2022    Menopause  -     DXA Bone Density Spine And Hip; Future; Expected date: 08/08/2022    Asplenia after surgical procedure    Abnormal CBC  -     Iron and TIBC; Future; Expected date: 08/08/2022    Colon cancer screening  -     Case Request Endoscopy: COLONOSCOPY    Need for hepatitis C screening test    Visit for screening  mammogram  -     Mammo Digital Screening Bilat w/ Armaan; Future; Expected date: 08/08/2022    Screening for osteoporosis  -     DXA Bone Density Spine And Hip; Future; Expected date: 08/08/2022    Obesity (BMI 30-39.9)    Other orders  -     ondansetron (ZOFRAN-ODT) 4 MG TbDL; Take 1 tablet (4 mg total) by mouth every 6 (six) hours as needed (nausea).  Dispense: 30 tablet; Refill: 0  -     (In Office Administered) Pneumococcal Polysaccharide Vaccine (23 Valent) (SQ/IM)  -     levothyroxine (SYNTHROID, LEVOTHROID) 175 MCG tablet; Take 1 tablet (175 mcg total) by mouth once daily.  Dispense: 90 tablet; Refill: 2  -     glimepiride (AMARYL) 4 MG tablet; Take 1 tablet (4 mg total) by mouth daily with breakfast. TAKE 1 TABLET BY MOUTH TWICE DAILY  Dispense: 60 tablet; Refill: 2    cut Glimepiride to once daily - goal is to d. c     Continue to work on regular exercise, maintain healthy weight, balanced diet. Avoid unhealthy habits: smoking, excessive alcohol intake.

## 2022-08-08 NOTE — PATIENT INSTRUCTIONS
"DRY EYES -- BURNING OR VEE SYMPTOMS:  Use Over The Counter artificial tears as needed for dry eye symptoms.   Some common brands include:  Systane, Optive, Refresh, and Thera-Tears.  These drops can be used as frequently as desired, but may be most helpful use during long periods of concentrated work.  For example, reading / working at the computer. Start with 3-4x per day.     Nighttime Ophthalmic gel or ointments are available: Refresh PM, Genteal, and Lacrilube.    Avoid drops that "get redness out" (Visine, Murine, Clear Eyes), as these may contain medication that could further irritate the eyes, especially with chronic use.    ALLERGY EYES -- ITCHING SYMPTOMS:  Over the counter medications include--Pataday, Zaditor, and Alaway.  Use as directed 1-2 drops daily for symptoms of itching / watering eyes.  These drops will not help for dry eye or exposure symptoms.    REDNESS RELIEF:  Lumify---is a good redness reliever that will not cause irritation if used chronically.        DIABETES AND THE EYE / DIABETIC RETINOPATHY    Diabetic retinopathy is a condition occurring in persons with diabetes, which causes progressive damage to the retina, the light sensitive lining at the back of the eye. It is a serious sight-threatening complication of diabetes.    Diabetic retinopathy is the result of damage to the tiny blood vessels that nourish the retina. They leak blood and other fluids that cause swelling of retinal tissue and clouding of vision. The condition usually affects both eyes. The longer a person has diabetes, the more likely they will develop diabetic retinopathy. If left untreated, diabetic retinopathy can cause blindness.  There are two basic types of diabetic retinopathy:    Background or nonproliferative diabetic retinopathy (NPDR)  Nonproliferative diabetic retinopathy (NPDR) is the earliest stage of diabetic retinopathy. With this condition, damaged blood vessels in the retina begin to leak extra fluid " and small amounts of blood into the eye. Sometimes, deposits of cholesterol or other fats from the blood may leak into the retina. Many people with diabetes have mild NPDR, which usually does not affect their vision. However, if their vision is affected, it is the result of macular edema and macular ischemia.    If vision is affected due to macular changes, a consult with a Retina Specialist may be advised.  This is an ophthalmologist that treats retina conditions, including diabetic retinopathy.     Proliferative diabetic retinopathy (PDR)  Proliferative diabetic retinopathy (PDR) mainly occurs when many of the blood vessels in the retina close, preventing enough blood flow. In an attempt to supply blood to the area where the original vessels closed, the retina responds by growing new blood vessels. This is called neovascularization. However, these new blood vessels are abnormal and do not supply the retina with proper blood flow. The new vessels are also often accompanied by scar tissue that may cause the retina to wrinkle or detach. PDR may cause more severe vision loss than NPDR because it can affect both central and peripheral vision.     A patient diagnosed with proliferative diabetic eye disease will be referred to a retinal specialist for consultation.    Often there are no visual symptoms in the early stages of diabetic retinopathy. That is why our eye care professionals recommend that everyone with diabetes have a comprehensive dilated eye examination once a year. Early detection and treatment can limit the potential for significant vision loss from diabetic retinopathy.       FLASHES / FLOATERS / POSTERIOR VITREOUS DETACHMENT    Call the clinic if you have any further changes in symptoms.  Including:  Increased numbers of floaters or flashing lights, dimness or darkness that moves through or stays constant in your vision, or any pain in the eye (s).    You may sometimes see small specks or clouds moving  in your field of vision.  They are called FLOATERS.  You can often see them when looking at a plain background, like a blank wall or blue cayla.  Floaters are actually tiny clumps of gel or cells inside the VITREOUS, the clear jelly-like fluid that fills the inside of your eye.    While these objects look like they are in front of your eye, they are actually floating inside.  What you see are the shadows they cast on the RETINA, the nerve layer at the back of the eye that senses light and allows you to see.      POSTERIOR VITREOUS DETACHMENT    The appearance of new floaters may be alarming.  If you suddenly develop new floaters, you should contact your eye care professional  right away.    The retina can tear if the shrinking vitreous pulls away from the wall of the eye.  This sometimes causes a small amount of bleeding in the eye that may appear as new floaters.    A torn retina is always a serious problem, since it can lead to a retinal detachment.  You should see your eye care professional as soon as possible if:    even one new floater appears suddenly;  you see sudden flashes of light;  you notice other symptoms, like the loss of side vision, or a curtain closes down in your vision        POSTERIOR VITREOUS DETACHMENT is more common for people who:    are nearsighted;  have had cataract surgery;  have had YAG laser surgery of the eye;  have had inflammation inside the eye;  are over age 60.      While some floaters may remain visible, many of them will fade over time and become less noticeable.  Even if you've had some floaters for years, you should have your eyes checked as soon as possible if you notice new ones.    FLASHING LIGHTS    When the vitreous gel rubs or pulls on the retina, you may see what look like flashing lights or lightning streaks.  These flashes can appear off and on for several weeks or months.      Some people experience flashes of light that appear as jagged lines or heat waves in both  eyes, lasting 10-20 minutes.  These flashes are caused by a spasm of blood vessels in the brain, which is called a migraine.    If a headache follows these flashes, it's called a migraine headache.  If   no headache occurs, these flashes are called Ophthalmic or Ocular Migraine.           Using the Amsler Grid  If you are at risk for vision loss, you may be told to check your eyesight regularly using the Amsler grid. Below is the grid and instructions for using it.         The Amsler grid helps you track changes in your vision.    How to Use the Amsler Grid  Use the grid in a well-lighted area.  Wear glasses or contact lenses if you usually wear them.  Hold the grid at your normal reading distance (about 16 inches).  Cover your left eye.  With your right eye, look at the dot in the center of the grid.  While looking at the dot, notice if any of the lines look wavy, if any lines disappear, or if the boxes change shape.  Write down on a piece of paper any vision changes from the last time you used the grid.  Now repeat the exercise, this time covering your right eye.  Call your doctor right away if you notice any vision changes.  How Often Should I Check My Vision?  Use the Amsler grid as often as your eye doctor suggests. Keep the grid where youll remember to use it. Call your eye doctor right away if you notice any changes with your eyesight. This includes if your vision improves.  © 4175-9124 [employer]az[employer], [address] [place] [address], [place] [address]. All rights reserved. This information is not intended as a substitute for professional medical care. Always follow your healthcare professional's instructions.

## 2022-08-08 NOTE — TELEPHONE ENCOUNTER
----- Message from Gregg Beasley sent at 8/8/2022  1:20 PM CDT -----  Contact: Pharmacy  Type: Needs Medical Advice    Who Called:[facility]  Best Call Back Number:[phone]  Additional Information Requesting a call back regarding Pharmacy was calling to verify directions on pt glimepiride (AMARYL) 4 MG tablet please call back when available Thank you  Please Advise-Thank you

## 2022-08-09 ENCOUNTER — TELEPHONE (OUTPATIENT)
Dept: GASTROENTEROLOGY | Facility: CLINIC | Age: 57
End: 2022-08-09
Payer: COMMERCIAL

## 2022-08-24 ENCOUNTER — PATIENT MESSAGE (OUTPATIENT)
Dept: ADMINISTRATIVE | Facility: HOSPITAL | Age: 57
End: 2022-08-24
Payer: COMMERCIAL

## 2022-10-10 ENCOUNTER — PATIENT MESSAGE (OUTPATIENT)
Dept: ADMINISTRATIVE | Facility: HOSPITAL | Age: 57
End: 2022-10-10
Payer: COMMERCIAL

## 2022-10-11 ENCOUNTER — PATIENT OUTREACH (OUTPATIENT)
Dept: ADMINISTRATIVE | Facility: HOSPITAL | Age: 57
End: 2022-10-11
Payer: COMMERCIAL

## 2022-10-24 ENCOUNTER — PATIENT MESSAGE (OUTPATIENT)
Dept: ADMINISTRATIVE | Facility: HOSPITAL | Age: 57
End: 2022-10-24
Payer: COMMERCIAL

## 2022-10-24 ENCOUNTER — PATIENT OUTREACH (OUTPATIENT)
Dept: ADMINISTRATIVE | Facility: HOSPITAL | Age: 57
End: 2022-10-24
Payer: COMMERCIAL

## 2022-10-24 ENCOUNTER — TELEPHONE (OUTPATIENT)
Dept: GASTROENTEROLOGY | Facility: CLINIC | Age: 57
End: 2022-10-24
Payer: COMMERCIAL

## 2022-10-24 NOTE — LETTER
October 31, 2022    Domi Leary  [address]  [place] [address]             [facility]  [address]  [facility] [address]  Phone: [phone] Dear Pamela [facility] is committed to your overall health.  To help you get the most out of each of your visits, we will review your information to make sure you are up to date on all of your recommended tests and/or procedures.       Rosales Boles MD  has found that your chart shows you may be due for :         Health Maintenance Due   Topic    COVID-19 Vaccine     Shingles Vaccine     Colorectal Cancer Screening     Cervical Cancer Screening     Foot Exam     Influenza Vaccine    Mammogram          If you have had any of the above done at another facility, please bring the records or information with you so that your record at [facility] will be complete.  If you would like to schedule any of these test, please call [phone] or send a message via [email or web address].        If you are currently taking medication, please bring it with you to your appointment for review.           Thank you for letting us care for you,        Rosales Boles MD and your [facility] Team

## 2022-10-24 NOTE — TELEPHONE ENCOUNTER
"Patient refused scheduled colonscopy screening from PCP request stating "Not at this time." Informed patient that I will cancel the request but that will not prevent patient scheduling her colonoscopy when she is ready. Patient verbalized understanding. Cancelled case request Notified ordering provider. Will schedule colonoscopy from this order upon patient call back.  "

## 2022-10-24 NOTE — PROGRESS NOTES
10/24/2022 Care Everywhere updates requested and reviewed.  Immunizations reconciled. Media reports reviewed.  Duplicate HM overrides and  orders removed.  Overdue HM topic chart audit and/or requested.  Overdue lab testing linked to upcoming lab appointments if applies.  Lab pastora, and [employer] reviewed   Pap Smear and Lab testing     A1c, urine micro, lipid scheduled 10.29.22    Health Maintenance Due   Topic Date Due    COVID-19 Vaccine (1) Never done    Shingles Vaccine (1 of 2) Never done    Colorectal Cancer Screening  Never done    Cervical Cancer Screening  10/24/2019    Hemoglobin A1c  2021    Foot Exam  2022    Diabetes Urine Screening  2022    Lipid Panel  2022    Influenza Vaccine (1) 2022    Mammogram  11/10/2022

## 2022-10-29 ENCOUNTER — LAB VISIT (OUTPATIENT)
Dept: LAB | Facility: HOSPITAL | Age: 57
End: 2022-10-29
Payer: COMMERCIAL

## 2022-10-29 DIAGNOSIS — E78.2 MIXED HYPERLIPIDEMIA: ICD-10-CM

## 2022-10-29 DIAGNOSIS — E03.4 HYPOTHYROIDISM DUE TO ACQUIRED ATROPHY OF THYROID: ICD-10-CM

## 2022-10-29 DIAGNOSIS — R79.89 ABNORMAL CBC: ICD-10-CM

## 2022-10-29 DIAGNOSIS — Z00.00 WELLNESS EXAMINATION: ICD-10-CM

## 2022-10-29 DIAGNOSIS — E11.40 TYPE 2 DIABETES MELLITUS WITH DIABETIC NEUROPATHY, WITHOUT LONG-TERM CURRENT USE OF INSULIN: ICD-10-CM

## 2022-10-29 LAB
ALBUMIN SERPL BCP-MCNC: 3.3 G/DL (ref 3.5–5.2)
ALP SERPL-CCNC: 101 U/L (ref 55–135)
ALT SERPL W/O P-5'-P-CCNC: 30 U/L (ref 10–44)
ANION GAP SERPL CALC-SCNC: 10 MMOL/L (ref 8–16)
AST SERPL-CCNC: 25 U/L (ref 10–40)
BILIRUB SERPL-MCNC: 0.6 MG/DL (ref 0.1–1)
BUN SERPL-MCNC: 10 MG/DL (ref 6–20)
CALCIUM SERPL-MCNC: 9.8 MG/DL (ref 8.7–10.5)
CHLORIDE SERPL-SCNC: 106 MMOL/L (ref 95–110)
CHOLEST SERPL-MCNC: 118 MG/DL (ref 120–199)
CHOLEST/HDLC SERPL: 2.1 {RATIO} (ref 2–5)
CO2 SERPL-SCNC: 26 MMOL/L (ref 23–29)
CREAT SERPL-MCNC: 0.7 MG/DL (ref 0.5–1.4)
EST. GFR  (NO RACE VARIABLE): >60 ML/MIN/1.73 M^2
ESTIMATED AVG GLUCOSE: 174 MG/DL (ref 68–131)
GLUCOSE SERPL-MCNC: 134 MG/DL (ref 70–110)
HBA1C MFR BLD: 7.7 % (ref 4–5.6)
HDLC SERPL-MCNC: 57 MG/DL (ref 40–75)
HDLC SERPL: 48.3 % (ref 20–50)
IRON SERPL-MCNC: 41 UG/DL (ref 30–160)
LDLC SERPL CALC-MCNC: 45 MG/DL (ref 63–159)
NONHDLC SERPL-MCNC: 61 MG/DL
POTASSIUM SERPL-SCNC: 4.1 MMOL/L (ref 3.5–5.1)
PROT SERPL-MCNC: 7.5 G/DL (ref 6–8.4)
SATURATED IRON: 10 % (ref 20–50)
SODIUM SERPL-SCNC: 142 MMOL/L (ref 136–145)
T4 FREE SERPL-MCNC: 1.13 NG/DL (ref 0.71–1.51)
TOTAL IRON BINDING CAPACITY: 414 UG/DL (ref 250–450)
TRANSFERRIN SERPL-MCNC: 280 MG/DL (ref 200–375)
TRIGL SERPL-MCNC: 80 MG/DL (ref 30–150)
TSH SERPL DL<=0.005 MIU/L-ACNC: 0.14 UIU/ML (ref 0.4–4)

## 2022-10-29 PROCEDURE — 84466 ASSAY OF TRANSFERRIN: CPT | Performed by: INTERNAL MEDICINE

## 2022-10-29 PROCEDURE — 36415 COLL VENOUS BLD VENIPUNCTURE: CPT | Mod: PO | Performed by: INTERNAL MEDICINE

## 2022-10-29 PROCEDURE — 83036 HEMOGLOBIN GLYCOSYLATED A1C: CPT | Performed by: INTERNAL MEDICINE

## 2022-10-29 PROCEDURE — 80053 COMPREHEN METABOLIC PANEL: CPT | Performed by: INTERNAL MEDICINE

## 2022-10-29 PROCEDURE — 84443 ASSAY THYROID STIM HORMONE: CPT | Performed by: INTERNAL MEDICINE

## 2022-10-29 PROCEDURE — 84439 ASSAY OF FREE THYROXINE: CPT | Performed by: INTERNAL MEDICINE

## 2022-10-29 PROCEDURE — 80061 LIPID PANEL: CPT | Performed by: INTERNAL MEDICINE

## 2022-11-07 ENCOUNTER — OFFICE VISIT (OUTPATIENT)
Dept: FAMILY MEDICINE | Facility: CLINIC | Age: 57
End: 2022-11-07
Payer: COMMERCIAL

## 2022-11-07 VITALS
HEIGHT: 63 IN | DIASTOLIC BLOOD PRESSURE: 76 MMHG | SYSTOLIC BLOOD PRESSURE: 114 MMHG | WEIGHT: 176.5 LBS | BODY MASS INDEX: 31.27 KG/M2

## 2022-11-07 DIAGNOSIS — Z01.89 ENCOUNTER FOR ROUTINE LABORATORY TESTING: ICD-10-CM

## 2022-11-07 DIAGNOSIS — E11.40 TYPE 2 DIABETES MELLITUS WITH DIABETIC NEUROPATHY, WITHOUT LONG-TERM CURRENT USE OF INSULIN: Primary | ICD-10-CM

## 2022-11-07 DIAGNOSIS — I10 ESSENTIAL HYPERTENSION: ICD-10-CM

## 2022-11-07 DIAGNOSIS — E78.2 MIXED HYPERLIPIDEMIA: ICD-10-CM

## 2022-11-07 DIAGNOSIS — E03.4 HYPOTHYROIDISM DUE TO ACQUIRED ATROPHY OF THYROID: ICD-10-CM

## 2022-11-07 DIAGNOSIS — E61.1 IRON DEFICIENCY: ICD-10-CM

## 2022-11-07 PROCEDURE — 3074F PR MOST RECENT SYSTOLIC BLOOD PRESSURE < 130 MM HG: ICD-10-PCS | Mod: CPTII,S$GLB,, | Performed by: INTERNAL MEDICINE

## 2022-11-07 PROCEDURE — 3061F PR NEG MICROALBUMINURIA RESULT DOCUMENTED/REVIEW: ICD-10-PCS | Mod: CPTII,S$GLB,, | Performed by: INTERNAL MEDICINE

## 2022-11-07 PROCEDURE — 3074F SYST BP LT 130 MM HG: CPT | Mod: CPTII,S$GLB,, | Performed by: INTERNAL MEDICINE

## 2022-11-07 PROCEDURE — 99999 PR PBB SHADOW E&M-EST. PATIENT-LVL III: CPT | Mod: PBBFAC,,, | Performed by: INTERNAL MEDICINE

## 2022-11-07 PROCEDURE — 99214 OFFICE O/P EST MOD 30 MIN: CPT | Mod: S$GLB,,, | Performed by: INTERNAL MEDICINE

## 2022-11-07 PROCEDURE — 1159F PR MEDICATION LIST DOCUMENTED IN MEDICAL RECORD: ICD-10-PCS | Mod: CPTII,S$GLB,, | Performed by: INTERNAL MEDICINE

## 2022-11-07 PROCEDURE — 3078F PR MOST RECENT DIASTOLIC BLOOD PRESSURE < 80 MM HG: ICD-10-PCS | Mod: CPTII,S$GLB,, | Performed by: INTERNAL MEDICINE

## 2022-11-07 PROCEDURE — 1160F RVW MEDS BY RX/DR IN RCRD: CPT | Mod: CPTII,S$GLB,, | Performed by: INTERNAL MEDICINE

## 2022-11-07 PROCEDURE — 1159F MED LIST DOCD IN RCRD: CPT | Mod: CPTII,S$GLB,, | Performed by: INTERNAL MEDICINE

## 2022-11-07 PROCEDURE — 3008F PR BODY MASS INDEX (BMI) DOCUMENTED: ICD-10-PCS | Mod: CPTII,S$GLB,, | Performed by: INTERNAL MEDICINE

## 2022-11-07 PROCEDURE — 99214 PR OFFICE/OUTPT VISIT, EST, LEVL IV, 30-39 MIN: ICD-10-PCS | Mod: S$GLB,,, | Performed by: INTERNAL MEDICINE

## 2022-11-07 PROCEDURE — 3051F PR MOST RECENT HEMOGLOBIN A1C LEVEL 7.0 - < 8.0%: ICD-10-PCS | Mod: CPTII,S$GLB,, | Performed by: INTERNAL MEDICINE

## 2022-11-07 PROCEDURE — 3066F PR DOCUMENTATION OF TREATMENT FOR NEPHROPATHY: ICD-10-PCS | Mod: CPTII,S$GLB,, | Performed by: INTERNAL MEDICINE

## 2022-11-07 PROCEDURE — 3008F BODY MASS INDEX DOCD: CPT | Mod: CPTII,S$GLB,, | Performed by: INTERNAL MEDICINE

## 2022-11-07 PROCEDURE — 99999 PR PBB SHADOW E&M-EST. PATIENT-LVL III: ICD-10-PCS | Mod: PBBFAC,,, | Performed by: INTERNAL MEDICINE

## 2022-11-07 PROCEDURE — 3051F HG A1C>EQUAL 7.0%<8.0%: CPT | Mod: CPTII,S$GLB,, | Performed by: INTERNAL MEDICINE

## 2022-11-07 PROCEDURE — 3066F NEPHROPATHY DOC TX: CPT | Mod: CPTII,S$GLB,, | Performed by: INTERNAL MEDICINE

## 2022-11-07 PROCEDURE — 1160F PR REVIEW ALL MEDS BY PRESCRIBER/CLIN PHARMACIST DOCUMENTED: ICD-10-PCS | Mod: CPTII,S$GLB,, | Performed by: INTERNAL MEDICINE

## 2022-11-07 PROCEDURE — 3078F DIAST BP <80 MM HG: CPT | Mod: CPTII,S$GLB,, | Performed by: INTERNAL MEDICINE

## 2022-11-07 PROCEDURE — 3061F NEG MICROALBUMINURIA REV: CPT | Mod: CPTII,S$GLB,, | Performed by: INTERNAL MEDICINE

## 2022-11-07 RX ORDER — SEMAGLUTIDE 1.34 MG/ML
1 INJECTION, SOLUTION SUBCUTANEOUS
Qty: 3 PEN | Refills: 2 | Status: SHIPPED | OUTPATIENT
Start: 2022-11-07 | End: 2023-04-19

## 2022-11-07 RX ORDER — LEVOTHYROXINE SODIUM 150 UG/1
150 TABLET ORAL DAILY
Qty: 90 TABLET | Refills: 3 | Status: SHIPPED | OUTPATIENT
Start: 2022-11-07 | End: 2023-09-13

## 2022-11-07 NOTE — PROGRESS NOTES
"Subjective:       Patient ID: Domi Leary is a 57 y.o. female.    Chief Complaint: Follow-up  Gyn: 2019 Dr Sanchez   MM/21  Dexa: never / rx   Colonoscopy:  years ago at [facility] will due in future   Immunizations: Flu: D Tdap:   Pneumovax:   Prevnar 13:  2016 Shingles:  Recommend Covid:    Smoker:  Former  Eye: [facility]     HPI    Here for f/u previous Dr. Nicole patient    Diabetes type 2:  A1c down to 7.7 from  A1c 9.0 //  added Ozempic currently at 0.5/  Rx glimepiride 4 q.d., Synjardy 1 q.d.  (SE: Metformin ER 1000 diarrhea. )    Hypothyroid: low TSH  Rx levo 175 taking daily with other a.m. meds.  Will reduce to 150 today.    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  // seen Dr Calle past     Metabolic syndrome BMI 33 weight 187.  Down to 31/weight 176 lb with Ozempic addition      Review of Systems:  Review of Systems   Constitutional:  Negative for appetite change.   HENT:  Negative for nosebleeds.    Eyes:  Negative for pain.   Respiratory:  Negative for choking.    Gastrointestinal:  Negative for blood in stool.   Genitourinary:  Negative for hematuria.   Skin:  Negative for pallor.   Neurological:  Negative for facial asymmetry.   Hematological:  Does not bruise/bleed easily.   Psychiatric/Behavioral:  Negative for confusion.      Objective:     Vitals:    22 0956   BP: 114/76   Weight: 80 kg (176 lb 7.7 oz)   Height: 5' 3" (1.6 m)          Physical Exam  Vitals reviewed.   Constitutional:       Appearance: Normal appearance.   HENT:      Head: Normocephalic and atraumatic.      Mouth/Throat:      Pharynx: Oropharynx is clear.   Eyes:      Extraocular Movements: Extraocular movements intact.      Conjunctiva/sclera: Conjunctivae normal.      Pupils: Pupils are equal, round, and reactive to light.   Cardiovascular:      " Rate and Rhythm: Normal rate and regular rhythm.      Heart sounds: Normal heart sounds.   Pulmonary:      Effort: Pulmonary effort is normal.      Breath sounds: Normal breath sounds.   Abdominal:      General: Bowel sounds are normal.      Palpations: Abdomen is soft.   Musculoskeletal:         General: Normal range of motion.      Cervical back: Normal range of motion and neck supple.   Skin:     General: Skin is warm and dry.   Neurological:      General: No focal deficit present.      Mental Status: She is alert and oriented to person, place, and time.   Psychiatric:         Mood and Affect: Mood normal.       Medication List with Changes/Refills   New Medications    SEMAGLUTIDE (OZEMPIC) 1 MG/DOSE (4 MG/3 ML)    Inject 1 mg into the skin every 7 days.   Current Medications    EMPAGLIFLOZIN-METFORMIN (SYNJARDY XR) 12.5-1,000 MG TBPH    Take 1 tablet by mouth Daily.    GLIMEPIRIDE (AMARYL) 4 MG TABLET    Take 1 tablet (4 mg total) by mouth daily with breakfast.    HYDROXYCHLOROQUINE (PLAQUENIL) 200 MG TABLET    TAKE 1 TABLET(200 MG) BY MOUTH TWICE DAILY    LEFLUNOMIDE (ARAVA) 20 MG TAB    TAKE 1 TABLET(20 MG) BY MOUTH EVERY DAY    PRAVASTATIN (PRAVACHOL) 10 MG TABLET    Take 1 tablet (10 mg total) by mouth once daily.   Changed and/or Refilled Medications    Modified Medication Previous Medication    LEVOTHYROXINE (SYNTHROID) 150 MCG TABLET levothyroxine (SYNTHROID, LEVOTHROID) 175 MCG tablet       Take 1 tablet (150 mcg total) by mouth once daily.    Take 1 tablet (175 mcg total) by mouth once daily.   Discontinued Medications    BLOOD SUGAR DIAGNOSTIC STRP    To check BG 1 times daily, to use with insurance preferred meter    LANCETS MISC    To check BG 1 times daily, to use with insurance preferred meter    ONDANSETRON (ZOFRAN-ODT) 4 MG TBDL    Take 1 tablet (4 mg total) by mouth every 6 (six) hours as needed (nausea).    SEMAGLUTIDE (OZEMPIC) 0.25 MG OR 0.5 MG(2 MG/1.5 ML) PEN INJECTOR    Inject 0.25 mg SQ  weekly x 4 wks; then increase to 0.5 mg SQ weekly       Assessment & Plan:  1. Type 2 diabetes mellitus with diabetic neuropathy, without long-term current use of insulin  - semaglutide (OZEMPIC) 1 mg/dose (4 mg/3 mL); Inject 1 mg into the skin every 7 days.  Dispense: 3 pen; Refill: 2  - Hemoglobin A1C; Future  - Hemoglobin A1C  - Comprehensive Metabolic Panel; Future  - Hemoglobin A1C; Future    2. Iron deficiency  - CBC Without Differential; Future  - Iron and TIBC; Future  - CBC Without Differential  - Iron and TIBC  - CBC Without Differential; Future  - Iron and TIBC; Future    3. Essential hypertension    4. Hypothyroidism due to acquired atrophy of thyroid  - TSH; Future  - T4, Free; Future  - TSH  - T4, Free  - levothyroxine (SYNTHROID) 150 MCG tablet; Take 1 tablet (150 mcg total) by mouth once daily.  Dispense: 90 tablet; Refill: 3  - TSH; Future  - T4, Free; Future    5. Mixed hyperlipidemia  - Lipid Panel; Future  - Lipid Panel  - Lipid Panel; Future    6. Encounter for routine laboratory testing  - CBC Without Differential; Future  - Hemoglobin A1C; Future  - Lipid Panel; Future  - TSH; Future  - T4, Free; Future  - Iron and TIBC; Future  - CBC Without Differential  - Hemoglobin A1C  - Lipid Panel  - TSH  - T4, Free  - Iron and TIBC  - CBC Without Differential; Future  - Comprehensive Metabolic Panel; Future  - Hemoglobin A1C; Future  - Lipid Panel; Future  - Iron and TIBC; Future  - TSH; Future  - T4, Free; Future     Type 2 diabetes mellitus with diabetic neuropathy, without long-term current use of insulin  -     semaglutide (OZEMPIC) 1 mg/dose (4 mg/3 mL); Inject 1 mg into the skin every 7 days.  Dispense: 3 pen; Refill: 2  -     Cancel: Comprehensive Metabolic Panel; Future; Expected date: 11/07/2022  -     Hemoglobin A1C; Future; Expected date: 11/07/2022  -     Comprehensive Metabolic Panel; Future; Expected date: 11/07/2022  -     Hemoglobin A1C; Future; Expected date: 11/07/2022    Iron  deficiency  Comments:  Add OTC multivitamins iron in the evening or plain iron 2-3 times weekly  Orders:  -     CBC Without Differential; Future; Expected date: 11/07/2022  -     Iron and TIBC; Future; Expected date: 11/07/2022  -     CBC Without Differential; Future; Expected date: 11/07/2022  -     Iron and TIBC; Future; Expected date: 11/07/2022    Essential hypertension    Hypothyroidism due to acquired atrophy of thyroid  Comments:  Cut down to 150 daily  Orders:  -     TSH; Future; Expected date: 11/07/2022  -     T4, Free; Future; Expected date: 11/07/2022  -     levothyroxine (SYNTHROID) 150 MCG tablet; Take 1 tablet (150 mcg total) by mouth once daily.  Dispense: 90 tablet; Refill: 3  -     TSH; Future; Expected date: 11/07/2022  -     T4, Free; Future; Expected date: 11/07/2022    Mixed hyperlipidemia  -     Lipid Panel; Future; Expected date: 11/07/2022  -     Lipid Panel; Future; Expected date: 11/07/2022    Encounter for routine laboratory testing  -     CBC Without Differential; Future; Expected date: 11/07/2022  -     Cancel: Comprehensive Metabolic Panel; Future; Expected date: 11/07/2022  -     Hemoglobin A1C; Future; Expected date: 11/07/2022  -     Lipid Panel; Future; Expected date: 11/07/2022  -     TSH; Future; Expected date: 11/07/2022  -     T4, Free; Future; Expected date: 11/07/2022  -     Iron and TIBC; Future; Expected date: 11/07/2022  -     CBC Without Differential; Future; Expected date: 11/07/2022  -     Comprehensive Metabolic Panel; Future; Expected date: 11/07/2022  -     Hemoglobin A1C; Future; Expected date: 11/07/2022  -     Lipid Panel; Future; Expected date: 11/07/2022  -     Iron and TIBC; Future; Expected date: 11/07/2022  -     TSH; Future; Expected date: 11/07/2022  -     T4, Free; Future; Expected date: 11/07/2022      Continue to work on regular exercise, maintain healthy weight, balanced diet. Avoid unhealthy habits: smoking, excessive alcohol intake.

## 2022-11-08 ENCOUNTER — TELEPHONE (OUTPATIENT)
Dept: GASTROENTEROLOGY | Facility: CLINIC | Age: 57
End: 2022-11-08
Payer: COMMERCIAL

## 2022-11-08 NOTE — TELEPHONE ENCOUNTER
Scheduled screening colonoscopy with Dr. Olmstead on 3/23/23. Patient agreed to procedure date. Called [facility] to [facility]. Mailed prep instructions to patient address.   Quality 111:Pneumonia Vaccination Status For Older Adults: Pneumococcal Vaccination Previously Received Quality 137: Melanoma: Continuity Of Care - Recall System: Patient information entered into a recall system that includes: target date for the next exam specified AND a process to follow up with patients regarding missed or unscheduled appointments Quality 130: Documentation Of Current Medications In The Medical Record: Current Medications Documented Quality 47: Advance Care Plan: Advance Care Planning discussed and documented in the medical record; patient did not wish or was not able to name a surrogate decision maker or provide an advance care plan. Quality 110: Preventive Care And Screening: Influenza Immunization: Influenza Immunization not Administered because Patient Refused. Quality 224: Stage 0-Iic Melanoma: Overutilization Of Imaging Studies For Only Stage 0-Iic Melanoma: None of the following diagnostic imaging studies ordered: chest X-ray, CT, Ultrasound, MRI, PET, or nuclear medicine scans (ML) Detail Level: Detailed Quality 226: Preventive Care And Screening: Tobacco Use: Screening And Cessation Intervention: Patient screened for tobacco and never smoked

## 2022-12-08 NOTE — TELEPHONE ENCOUNTER
Pharmacy requesting refill on Tramadol  Pt's LOV 06/29/2022  Pt's NOV 12/29/2022  Medication pending   aware verified last refill 09/09/2021

## 2022-12-12 RX ORDER — TRAMADOL HYDROCHLORIDE 50 MG/1
50 TABLET ORAL EVERY 12 HOURS PRN
Qty: 60 TABLET | Refills: 0 | OUTPATIENT
Start: 2022-12-12

## 2022-12-12 NOTE — TELEPHONE ENCOUNTER
Patient cancelled 12/8/22 f/u appt. By law I cannot write controlled substance if last visit missed/cancelled or greater than 4 months. Dr. BUTCHER

## 2023-01-17 ENCOUNTER — PATIENT MESSAGE (OUTPATIENT)
Dept: ADMINISTRATIVE | Facility: HOSPITAL | Age: 58
End: 2023-01-17
Payer: COMMERCIAL

## 2023-02-09 DIAGNOSIS — E11.40 TYPE 2 DIABETES MELLITUS WITH DIABETIC NEUROPATHY, WITHOUT LONG-TERM CURRENT USE OF INSULIN: ICD-10-CM

## 2023-02-09 RX ORDER — EMPAGLIFLOZIN, METFORMIN HYDROCHLORIDE 12.5; 1 MG/1; MG/1
1 TABLET, EXTENDED RELEASE ORAL DAILY
Qty: 90 TABLET | Refills: 0 | Status: SHIPPED | OUTPATIENT
Start: 2023-02-09 | End: 2023-02-09 | Stop reason: SDUPTHER

## 2023-02-09 RX ORDER — EMPAGLIFLOZIN, METFORMIN HYDROCHLORIDE 12.5; 1 MG/1; MG/1
1 TABLET, EXTENDED RELEASE ORAL DAILY
Qty: 30 TABLET | Refills: 0 | Status: SHIPPED | OUTPATIENT
Start: 2023-02-09 | End: 2023-05-09

## 2023-02-09 NOTE — TELEPHONE ENCOUNTER
Care Due:                  Date            Visit Type   Department     Provider  --------------------------------------------------------------------------------                                EP -                              [facility]  Last Visit: 11-      CARE ([facility])   MEDICINE       Rosales Boles                               -                              [facility]  Next Visit: 05-      CARE ([facility])   MEDICINE       Rosales Boles                                                            Last  Test          Frequency    Reason                     Performed    Due Date  --------------------------------------------------------------------------------    HBA1C.......  6 months...  empagliflozin-metformin,   10-   04-                             glimepiride, semaglutide.    [facility] Embedded Care Gaps. Reference number: [id number]. 2/09/2023   6:08:44 AM CST

## 2023-02-09 NOTE — TELEPHONE ENCOUNTER
Refill Decision Note   Domi Leary  is requesting a refill authorization.  Brief Assessment and Rationale for Refill:  Approve     Medication Therapy Plan:       Medication Reconciliation Completed: No   Comments:     Provider Staff:     Action is required for this patient.   Please see care gap opportunities below in Care Due Message.     Thanks!  [facility]     Appointments      Date Provider   Last Visit   11/7/2022 Rosales Boles MD   Next Visit   5/8/2023 Rosales Boles MD     Note composed:8:32 AM 02/09/2023           Note composed:8:32 AM 02/09/2023

## 2023-03-21 ENCOUNTER — TELEPHONE (OUTPATIENT)
Dept: SURGERY | Facility: CLINIC | Age: 58
End: 2023-03-21
Payer: COMMERCIAL

## 2023-03-21 NOTE — TELEPHONE ENCOUNTER
----- Message from Lilliana Azul sent at 3/21/2023  1:56 PM CDT -----  Contact: pt  Type:  Needs Medical Advice    Who Called: pt  Would the patient rather a call back or a response via [facility]? call  Best Call Back Number: [phone]  Additional Information: States that pt would like her co pay before procedure. Please advise thank you

## 2023-03-23 ENCOUNTER — ANESTHESIA EVENT (OUTPATIENT)
Dept: ENDOSCOPY | Facility: HOSPITAL | Age: 58
End: 2023-03-23
Payer: COMMERCIAL

## 2023-03-23 ENCOUNTER — ANESTHESIA (OUTPATIENT)
Dept: ENDOSCOPY | Facility: HOSPITAL | Age: 58
End: 2023-03-23
Payer: COMMERCIAL

## 2023-03-23 ENCOUNTER — HOSPITAL ENCOUNTER (OUTPATIENT)
Facility: HOSPITAL | Age: 58
Discharge: HOME | End: 2023-03-23
Attending: SURGERY | Admitting: SURGERY
Payer: COMMERCIAL

## 2023-03-23 DIAGNOSIS — Z12.11 ENCOUNTER FOR SCREENING COLONOSCOPY: Primary | ICD-10-CM

## 2023-03-23 LAB — GLUCOSE SERPL-MCNC: 169 MG/DL (ref 70–110)

## 2023-03-23 PROCEDURE — D9220A PRA ANESTHESIA: Mod: 33,CRNA,, | Performed by: NURSE ANESTHETIST, CERTIFIED REGISTERED

## 2023-03-23 PROCEDURE — 63600175 PHARM REV CODE 636 W HCPCS: Mod: PO | Performed by: SURGERY

## 2023-03-23 PROCEDURE — 88305 TISSUE EXAM BY PATHOLOGIST: CPT | Performed by: PATHOLOGY

## 2023-03-23 PROCEDURE — 25000003 PHARM REV CODE 250: Mod: PO | Performed by: NURSE ANESTHETIST, CERTIFIED REGISTERED

## 2023-03-23 PROCEDURE — 63600175 PHARM REV CODE 636 W HCPCS: Mod: PO | Performed by: NURSE ANESTHETIST, CERTIFIED REGISTERED

## 2023-03-23 PROCEDURE — 45385 PR COLONOSCOPY,REMV LESN,SNARE: ICD-10-PCS | Mod: 33,,, | Performed by: SURGERY

## 2023-03-23 PROCEDURE — 45385 COLONOSCOPY W/LESION REMOVAL: CPT | Mod: PT,PO | Performed by: SURGERY

## 2023-03-23 PROCEDURE — 88305 TISSUE EXAM BY PATHOLOGIST: CPT | Mod: 26,,, | Performed by: PATHOLOGY

## 2023-03-23 PROCEDURE — 88305 TISSUE EXAM BY PATHOLOGIST: ICD-10-PCS | Mod: 26,,, | Performed by: PATHOLOGY

## 2023-03-23 PROCEDURE — D9220A PRA ANESTHESIA: ICD-10-PCS | Mod: 33,ANES,, | Performed by: ANESTHESIOLOGY

## 2023-03-23 PROCEDURE — 37000008 HC ANESTHESIA 1ST 15 MINUTES: Mod: PO | Performed by: SURGERY

## 2023-03-23 PROCEDURE — D9220A PRA ANESTHESIA: ICD-10-PCS | Mod: 33,CRNA,, | Performed by: NURSE ANESTHETIST, CERTIFIED REGISTERED

## 2023-03-23 PROCEDURE — 27201089 HC SNARE, DISP (ANY): Mod: PO | Performed by: SURGERY

## 2023-03-23 PROCEDURE — D9220A PRA ANESTHESIA: Mod: 33,ANES,, | Performed by: ANESTHESIOLOGY

## 2023-03-23 PROCEDURE — 45385 COLONOSCOPY W/LESION REMOVAL: CPT | Mod: 33,,, | Performed by: SURGERY

## 2023-03-23 PROCEDURE — 37000009 HC ANESTHESIA EA ADD 15 MINS: Mod: PO | Performed by: SURGERY

## 2023-03-23 RX ORDER — LIDOCAINE HYDROCHLORIDE 20 MG/ML
INJECTION INTRAVENOUS
Status: DISCONTINUED | OUTPATIENT
Start: 2023-03-23 | End: 2023-03-23

## 2023-03-23 RX ORDER — SODIUM CHLORIDE, SODIUM LACTATE, POTASSIUM CHLORIDE, CALCIUM CHLORIDE 600; 310; 30; 20 MG/100ML; MG/100ML; MG/100ML; MG/100ML
INJECTION, SOLUTION INTRAVENOUS CONTINUOUS
Status: DISCONTINUED | OUTPATIENT
Start: 2023-03-23 | End: 2023-03-23 | Stop reason: HOSPADM

## 2023-03-23 RX ORDER — PROPOFOL 10 MG/ML
VIAL (ML) INTRAVENOUS
Status: DISCONTINUED | OUTPATIENT
Start: 2023-03-23 | End: 2023-03-23

## 2023-03-23 RX ADMIN — PROPOFOL 25 MG: 10 INJECTION, EMULSION INTRAVENOUS at 07:03

## 2023-03-23 RX ADMIN — SODIUM CHLORIDE, SODIUM LACTATE, POTASSIUM CHLORIDE, AND CALCIUM CHLORIDE: .6; .31; .03; .02 INJECTION, SOLUTION INTRAVENOUS at 07:03

## 2023-03-23 RX ADMIN — LIDOCAINE HYDROCHLORIDE 100 MG: 20 INJECTION INTRAVENOUS at 07:03

## 2023-03-23 RX ADMIN — PROPOFOL 150 MG: 10 INJECTION, EMULSION INTRAVENOUS at 07:03

## 2023-03-23 NOTE — TRANSFER OF CARE
"Anesthesia Transfer of Care Note    Patient: Domi Leary    Procedure(s) Performed: Procedure(s) (LRB):  COLONOSCOPY (N/A)    Patient location: PACU    Anesthesia Type: general    Transport from OR: Transported from OR on room air with adequate spontaneous ventilation    Post pain: adequate analgesia    Post assessment: no apparent anesthetic complications and tolerated procedure well    Post vital signs: stable    Level of consciousness: sedated    Nausea/Vomiting: no nausea/vomiting    Complications: none    Transfer of care protocol was followed      Last vitals:   Visit Vitals  BP (!) 146/68   Pulse 90   Temp 36.9 °C (98.4 °F) (Skin)   Resp 16   Ht 5' 3" (1.6 m)   Wt 79.8 kg (176 lb)   SpO2 96%   Breastfeeding No   BMI 31.18 kg/m²     "

## 2023-03-23 NOTE — ANESTHESIA PREPROCEDURE EVALUATION
03/23/2023  Domi Leary is a 58 y.o., female.      Pre-op Assessment    I have reviewed the Patient Summary Reports.     I have reviewed the Nursing Notes.       Review of Systems  Anesthesia Hx:  No problems with previous Anesthesia    Cardiovascular:   Hypertension    Musculoskeletal:   Arthritis     Endocrine:   Diabetes Hypothyroidism        Physical Exam  General: Well nourished        Anesthesia Plan  Type of Anesthesia, risks & benefits discussed:    Anesthesia Type: Gen Natural Airway  Intra-op Monitoring Plan: Standard ASA Monitors  Induction:  IV  Informed Consent: Informed consent signed with the Patient and all parties understand the risks and agree with anesthesia plan.  All questions answered.   ASA Score: 3  Day of Surgery Review of History & Physical: H&P Update referred to the surgeon/provider.    Ready For Surgery From Anesthesia Perspective.     .

## 2023-03-23 NOTE — PROVATION PATIENT INSTRUCTIONS
Discharge Summary/Instructions after an Endoscopic Procedure  Patient Name: Domi Leary  Patient MRN: [id number]  Patient YOB: 1965  Thursday, March 23, 2023  Rinku Olmstead MD  Dear patient,  As a result of recent federal legislation (The Federal Cures Act), you may   receive lab or pathology results from your procedure in your [facility]   account before your physician is able to contact you. Your physician or   their representative will relay the results to you with their   recommendations at their soonest availability.  Thank you,  RESTRICTIONS:  During your procedure today, you received medications for sedation.  These   medications may affect your judgment, balance and coordination.  Therefore,   for 24 hours, you have the following restrictions:   - DO NOT drive a car, operate machinery, make legal/financial decisions,   sign important papers or drink alcohol.    ACTIVITY:  Today: no heavy lifting, straining or running due to procedural   sedation/anesthesia.  The following day: return to full activity including work.  DIET:  Eat and drink normally unless instructed otherwise.     TREATMENT FOR COMMON SIDE EFFECTS:  - Mild abdominal pain, nausea, belching, bloating or excessive gas:  rest,   eat lightly and use a heating pad.  - Sore Throat: treat with throat lozenges and/or gargle with warm salt   water.  - Because air was used during the procedure, expelling large amounts of air   from your rectum or belching is normal.  - If a bowel prep was taken, you may not have a bowel movement for 1-3 days.    This is normal.  SYMPTOMS TO WATCH FOR AND REPORT TO YOUR PHYSICIAN:  1. Abdominal pain or bloating, other than gas cramps.  2. Chest pain.  3. Back pain.  4. Signs of infection such as: chills or fever occurring within 24 hours   after the procedure.  5. Rectal bleeding, which would show as bright red, maroon, or black stools.   (A tablespoon of blood from the rectum is not serious, especially if    hemorrhoids are present.)  6. Vomiting.  7. Weakness or dizziness.  GO DIRECTLY TO THE NEAREST EMERGENCY ROOM IF YOU HAVE ANY OF THE FOLLOWING:      Difficulty breathing              Chills and/or fever over 101 F   Persistent vomiting and/or vomiting blood   Severe abdominal pain   Severe chest pain   Black, tarry stools   Bleeding- more than one tablespoon   Any other symptom or condition that you feel may need urgent attention  Your doctor recommends these additional instructions:  If any biopsies were taken, your doctors clinic will contact you in 1 to 2   weeks with any results.  You are being discharged to home.   Resume your regular diet.   Continue your present medications.   We are waiting for your pathology results.   Your physician has recommended a repeat colonoscopy in five years for   surveillance.   Return to my office as needed.  For questions, problems or results please call your physician - Rinku Olmstead MD at Work:  [phone].  EMERGENCY PHONE NUMBER: [phone], LAB RESULTS: [phone]  IF A COMPLICATION OR EMERGENCY SITUATION ARISES AND YOU ARE UNABLE TO REACH   YOUR PHYSICIAN - GO DIRECTLY TO THE EMERGENCY ROOM.  ___________________________________________  Nurse Signature  ___________________________________________  Patient/Designated Responsible Party Signature  Rinku Olmstead MD  3/23/2023 7:57:47 AM  This report has been verified and signed electronically.  Dear patient,  As a result of recent federal legislation (The Federal Cures Act), you may   receive lab or pathology results from your procedure in your [facility]   account before your physician is able to contact you. Your physician or   their representative will relay the results to you with their   recommendations at their soonest availability.  Thank you.  PROVATION

## 2023-03-23 NOTE — H&P
[facility] - Endoscopy  History & Physical     Subjective:     Chief Complaint/Reason for Admission: screening colonoscopy    History of Present Illness:   Patient 58 y.o. female presents for screening colonoscopy.  Pt notes she has no abd pain or changs in bowel habits.      Patient Active Problem List    Diagnosis Date Noted    Menopause 08/08/2022    Mixed hyperlipidemia 08/08/2022    Essential hypertension 04/10/2019    Obesity (BMI 30-39.9) 11/01/2016    Asplenia after surgical procedure 07/28/2016    Long-term use of immunosuppressant medication 06/30/2016    Type 2 diabetes mellitus with diabetic neuropathy, without long-term current use of insulin 04/06/2016    Seropositive rheumatoid arthritis of multiple sites 11/17/2015    Hallux abductovalgus 07/17/2012    Subcutaneous rheumatoid nodule 07/17/2012    Hypothyroidism due to acquired atrophy of thyroid 02/20/2012        Medications Prior to Admission   Medication Sig Dispense Refill Last Dose    empagliflozin-metformin (SYNJARDY XR) 12.5-1,000 mg TBph Take 1 tablet by mouth Daily. ((Due labs)) 30 tablet 0 3/21/2023    glimepiride (AMARYL) 4 MG tablet Take 1 tablet (4 mg total) by mouth daily with breakfast. 90 tablet 2 3/21/2023    hydrOXYchloroQUINE (PLAQUENIL) 200 mg tablet TAKE 1 TABLET(200 MG) BY MOUTH TWICE DAILY 60 tablet 6 3/21/2023    leflunomide (ARAVA) 20 MG Tab TAKE 1 TABLET(20 MG) BY MOUTH EVERY DAY 90 tablet 3 3/21/2023    levothyroxine (SYNTHROID) 150 MCG tablet Take 1 tablet (150 mcg total) by mouth once daily. 90 tablet 3 3/21/2023    pravastatin (PRAVACHOL) 10 MG tablet Take 1 tablet (10 mg total) by mouth once daily. 90 tablet 2 3/21/2023    semaglutide (OZEMPIC) 1 mg/dose (4 mg/3 mL) Inject 1 mg into the skin every 7 days. 3 pen 2 More than a month     Review of patient's allergies indicates:   Allergen Reactions    Enbrel [etanercept] Rash     psoriasis    Amoxil [amoxicillin] Hives        Past Medical History:   Diagnosis Date     Diabetes 2012    Hypothyroid 2012    Rheumatoid arthritis 2012      Past Surgical History:   Procedure Laterality Date     SECTION      1    COSMETIC SURGERY      breast augmentation and removal    HERNIA REPAIR      left LUQ incision    SPLENECTOMY, TOTAL      age 43 // main O    TONSILLECTOMY        Family History   Problem Relation Age of Onset    No Known Problems Mother     Ulcerative colitis Father     Cataracts Paternal Grandmother     Diabetes Maternal Aunt     Breast cancer Maternal Aunt     Glaucoma Neg Hx     Macular degeneration Neg Hx     Retinal detachment Neg Hx       Social History     Tobacco Use    Smoking status: Former     Types: Cigarettes     Quit date: 2009     Years since quittin.0    Smokeless tobacco: Never   Substance Use Topics    Alcohol use: Yes     Comment: occassionally        Review of Systems:  A comprehensive review of systems was negative.    OBJECTIVE:     Patient Vitals for the past 8 hrs:   BP Temp Temp src Pulse Resp SpO2   23 0642 (!) 146/68 98.4 °F (36.9 °C) Skin 90 16 96 %     AAOx3  CTA B  Soft/nt/nd      Data Review:      ASSESSMENT/PLAN:     There are no hospital problems to display for this patient.    Screening cscope  Plan:  To have cscope today

## 2023-03-24 VITALS
OXYGEN SATURATION: 99 % | DIASTOLIC BLOOD PRESSURE: 79 MMHG | HEIGHT: 63 IN | WEIGHT: 176 LBS | TEMPERATURE: 98 F | HEART RATE: 79 BPM | BODY MASS INDEX: 31.18 KG/M2 | RESPIRATION RATE: 17 BRPM | SYSTOLIC BLOOD PRESSURE: 134 MMHG

## 2023-03-24 NOTE — ANESTHESIA POSTPROCEDURE EVALUATION
Anesthesia Post Evaluation    Patient: Domi Leary    Procedure(s) Performed: Procedure(s) (LRB):  COLONOSCOPY (N/A)    Final Anesthesia Type: general      Patient location during evaluation: PACU  Patient participation: Yes- Able to Participate  Level of consciousness: awake and alert  Post-procedure vital signs: reviewed and stable  Pain management: adequate  Airway patency: patent    PONV status at discharge: No PONV  Anesthetic complications: no      Cardiovascular status: blood pressure returned to baseline  Respiratory status: unassisted and room air  Hydration status: euvolemic  Follow-up not needed.          Vitals Value Taken Time   /79 03/23/23 0812   Temp  03/24/23 0809   Pulse 79 03/23/23 0812   Resp 17 03/23/23 0812   SpO2 99 % 03/23/23 0812         Event Time   Out of Recovery 08:13:00         Pain/Lexi Score: Lexi Score: 10 (3/23/2023  8:12 AM)

## 2023-03-29 LAB
FINAL PATHOLOGIC DIAGNOSIS: NORMAL
GROSS: NORMAL
Lab: NORMAL

## 2023-04-06 ENCOUNTER — TELEPHONE (OUTPATIENT)
Dept: SURGERY | Facility: HOSPITAL | Age: 58
End: 2023-04-06
Payer: COMMERCIAL

## 2023-04-06 NOTE — TELEPHONE ENCOUNTER
Called and reviewed pathology with pt.      Colon, hepatic flexure polyp, biopsy: Colonic mucosa/submucosa with no   significant histopathologic abnormality.     Recommend repeat cscope in 5 years

## 2023-04-06 NOTE — TELEPHONE ENCOUNTER
Called and reviewed pathology with pt.      Colon, hepatic flexure polyp, biopsy: Colonic mucosa/submucosa with no   significant histopathologic abnormality    Recommend repeat cscope in 5 years

## 2023-04-11 ENCOUNTER — PATIENT MESSAGE (OUTPATIENT)
Dept: ADMINISTRATIVE | Facility: HOSPITAL | Age: 58
End: 2023-04-11
Payer: COMMERCIAL

## 2023-04-19 DIAGNOSIS — E11.40 TYPE 2 DIABETES MELLITUS WITH DIABETIC NEUROPATHY, WITHOUT LONG-TERM CURRENT USE OF INSULIN: ICD-10-CM

## 2023-04-19 RX ORDER — SEMAGLUTIDE 1.34 MG/ML
INJECTION, SOLUTION SUBCUTANEOUS
Qty: 3 ML | Refills: 0 | Status: SHIPPED | OUTPATIENT
Start: 2023-04-19 | End: 2023-04-19 | Stop reason: SDUPTHER

## 2023-04-19 RX ORDER — SEMAGLUTIDE 1.34 MG/ML
INJECTION, SOLUTION SUBCUTANEOUS
Qty: 3 ML | Refills: 0 | Status: SHIPPED | OUTPATIENT
Start: 2023-04-19 | End: 2023-05-08

## 2023-04-19 NOTE — TELEPHONE ENCOUNTER
Care Due:                  Date            Visit Type   Department     Provider  --------------------------------------------------------------------------------                                EP -                              [facility]  Last Visit: 11-      CARE ([facility])   MEDICINE       Rosales Boles                               -                              [facility]  Next Visit: 05-      CARE ([facility])   MEDICINE       Rosales Boles                                                            Last  Test          Frequency    Reason                     Performed    Due Date  --------------------------------------------------------------------------------    HBA1C.......  6 months...  empagliflozin-metformin,   10-   04-                             glimepiride, semaglutide.    [facility] Embedded Care Gaps. Reference number: [id number]. 4/19/2023   4:24:10 PM CDT

## 2023-04-19 NOTE — TELEPHONE ENCOUNTER
Refill Decision Note   Domi Leary  is requesting a refill authorization.  Brief Assessment and Rationale for Refill:  Approve     Medication Therapy Plan:         Comments:     Note composed:4:42 PM 04/19/2023             Appointments     Last Visit   11/7/2022 Rosales Boles MD   Next Visit   5/8/2023 Rosales Boles MD

## 2023-04-24 ENCOUNTER — PATIENT OUTREACH (OUTPATIENT)
Dept: ADMINISTRATIVE | Facility: HOSPITAL | Age: 58
End: 2023-04-24
Payer: COMMERCIAL

## 2023-04-24 ENCOUNTER — PATIENT MESSAGE (OUTPATIENT)
Dept: ADMINISTRATIVE | Facility: HOSPITAL | Age: 58
End: 2023-04-24
Payer: COMMERCIAL

## 2023-04-24 NOTE — PROGRESS NOTES
2023 Care Everywhere updates requested and reviewed.  Immunizations reconciled. Media reports reviewed.  Duplicate HM overrides and  orders removed.  Overdue HM topic chart audit and/or requested.  Overdue lab testing linked to upcoming lab appointments if applies.  Lab pastora, and [employer] reviewed   Pap Smear and Lab testing     DIS down and unable to be reviewed      Mammogram    Health Maintenance Due   Topic Date Due    COVID-19 Vaccine (1) Never done    Shingles Vaccine (1 of 2) Never done    Cervical Cancer Screening  10/24/2019    Mammogram  11/10/2022    TETANUS VACCINE  2023    Hemoglobin A1c  2023

## 2023-04-24 NOTE — LETTER
May 1, 2023    Domi Leary  [address]  [place] [address]             [facility]  [address]  [facility] [address]  Phone: [phone]    Dear [facility] is committed to your overall health.  To help you get the most out of each of your visits, we will review your information to make sure you are up to date on all of your recommended tests and/or procedures.       Rosales Boles MD  has found that your chart shows you may be due for :         Health Maintenance Due   Topic    COVID-19 Vaccine (1)    Shingles Vaccine (1 of 2)    Cervical Cancer Screening     Mammogram     TETANUS VACCINE     Hemoglobin A1c          If you have had any of the above done at another facility, please bring the records or information with you so that your record at [facility] will be complete.  If you would like to schedule any of these test, please call [phone] or send a message via [email or web address].        If you are currently taking medication, please bring it with you to your appointment for review.           Thank you for letting us care for you,        Rosales Boles MD and your [facility] Team

## 2023-05-08 ENCOUNTER — LAB VISIT (OUTPATIENT)
Dept: LAB | Facility: HOSPITAL | Age: 58
End: 2023-05-08
Attending: INTERNAL MEDICINE
Payer: COMMERCIAL

## 2023-05-08 ENCOUNTER — OFFICE VISIT (OUTPATIENT)
Dept: FAMILY MEDICINE | Facility: CLINIC | Age: 58
End: 2023-05-08
Payer: COMMERCIAL

## 2023-05-08 ENCOUNTER — TELEPHONE (OUTPATIENT)
Dept: FAMILY MEDICINE | Facility: CLINIC | Age: 58
End: 2023-05-08
Payer: COMMERCIAL

## 2023-05-08 VITALS
HEART RATE: 94 BPM | RESPIRATION RATE: 16 BRPM | OXYGEN SATURATION: 96 % | DIASTOLIC BLOOD PRESSURE: 72 MMHG | WEIGHT: 181.44 LBS | SYSTOLIC BLOOD PRESSURE: 128 MMHG | BODY MASS INDEX: 32.15 KG/M2 | HEIGHT: 63 IN

## 2023-05-08 DIAGNOSIS — M05.79 SEROPOSITIVE RHEUMATOID ARTHRITIS OF MULTIPLE SITES: ICD-10-CM

## 2023-05-08 DIAGNOSIS — E11.40 TYPE 2 DIABETES MELLITUS WITH DIABETIC NEUROPATHY, WITHOUT LONG-TERM CURRENT USE OF INSULIN: ICD-10-CM

## 2023-05-08 DIAGNOSIS — D84.9 IMMUNOSUPPRESSION: ICD-10-CM

## 2023-05-08 DIAGNOSIS — E03.4 HYPOTHYROIDISM DUE TO ACQUIRED ATROPHY OF THYROID: ICD-10-CM

## 2023-05-08 DIAGNOSIS — E61.1 IRON DEFICIENCY: ICD-10-CM

## 2023-05-08 DIAGNOSIS — Z00.00 WELLNESS EXAMINATION: ICD-10-CM

## 2023-05-08 DIAGNOSIS — E78.2 MIXED HYPERLIPIDEMIA: ICD-10-CM

## 2023-05-08 DIAGNOSIS — E11.65 TYPE 2 DIABETES MELLITUS WITH HYPERGLYCEMIA, WITHOUT LONG-TERM CURRENT USE OF INSULIN: Primary | ICD-10-CM

## 2023-05-08 LAB
ALBUMIN SERPL BCP-MCNC: 3.3 G/DL (ref 3.5–5.2)
ALBUMIN SERPL BCP-MCNC: 3.3 G/DL (ref 3.5–5.2)
ALP SERPL-CCNC: 101 U/L (ref 55–135)
ALP SERPL-CCNC: 91 U/L (ref 55–135)
ALT SERPL W/O P-5'-P-CCNC: 45 U/L (ref 10–44)
ALT SERPL W/O P-5'-P-CCNC: 45 U/L (ref 10–44)
ANION GAP SERPL CALC-SCNC: 11 MMOL/L (ref 8–16)
ANION GAP SERPL CALC-SCNC: 12 MMOL/L (ref 8–16)
AST SERPL-CCNC: 25 U/L (ref 10–40)
AST SERPL-CCNC: 26 U/L (ref 10–40)
BILIRUB SERPL-MCNC: 0.3 MG/DL (ref 0.1–1)
BILIRUB SERPL-MCNC: 0.3 MG/DL (ref 0.1–1)
BUN SERPL-MCNC: 10 MG/DL (ref 6–20)
BUN SERPL-MCNC: 11 MG/DL (ref 6–20)
CALCIUM SERPL-MCNC: 9.6 MG/DL (ref 8.7–10.5)
CALCIUM SERPL-MCNC: 9.8 MG/DL (ref 8.7–10.5)
CHLORIDE SERPL-SCNC: 103 MMOL/L (ref 95–110)
CHLORIDE SERPL-SCNC: 104 MMOL/L (ref 95–110)
CO2 SERPL-SCNC: 27 MMOL/L (ref 23–29)
CO2 SERPL-SCNC: 28 MMOL/L (ref 23–29)
CREAT SERPL-MCNC: 0.9 MG/DL (ref 0.5–1.4)
CREAT SERPL-MCNC: 0.9 MG/DL (ref 0.5–1.4)
CRP SERPL-MCNC: 4.9 MG/L (ref 0–8.2)
ERYTHROCYTE [DISTWIDTH] IN BLOOD BY AUTOMATED COUNT: 14 % (ref 11.5–14.5)
ERYTHROCYTE [SEDIMENTATION RATE] IN BLOOD BY PHOTOMETRIC METHOD: 8 MM/HR (ref 0–36)
EST. GFR  (NO RACE VARIABLE): >60 ML/MIN/1.73 M^2
EST. GFR  (NO RACE VARIABLE): >60 ML/MIN/1.73 M^2
ESTIMATED AVG GLUCOSE: 177 MG/DL (ref 68–131)
GLUCOSE SERPL-MCNC: 225 MG/DL (ref 70–110)
GLUCOSE SERPL-MCNC: 230 MG/DL (ref 70–110)
HBA1C MFR BLD: 7.8 % (ref 4–5.6)
HCT VFR BLD AUTO: 45.9 % (ref 37–48.5)
HGB BLD-MCNC: 14.7 G/DL (ref 12–16)
IRON SERPL-MCNC: 67 UG/DL (ref 30–160)
MCH RBC QN AUTO: 29.9 PG (ref 27–31)
MCHC RBC AUTO-ENTMCNC: 32 G/DL (ref 32–36)
MCV RBC AUTO: 93 FL (ref 82–98)
PLATELET # BLD AUTO: 399 K/UL (ref 150–450)
PMV BLD AUTO: 11.3 FL (ref 9.2–12.9)
POTASSIUM SERPL-SCNC: 4.2 MMOL/L (ref 3.5–5.1)
POTASSIUM SERPL-SCNC: 4.3 MMOL/L (ref 3.5–5.1)
PROT SERPL-MCNC: 7.1 G/DL (ref 6–8.4)
PROT SERPL-MCNC: 7.2 G/DL (ref 6–8.4)
RBC # BLD AUTO: 4.92 M/UL (ref 4–5.4)
SATURATED IRON: 17 % (ref 20–50)
SODIUM SERPL-SCNC: 141 MMOL/L (ref 136–145)
SODIUM SERPL-SCNC: 144 MMOL/L (ref 136–145)
TOTAL IRON BINDING CAPACITY: 391 UG/DL (ref 250–450)
TRANSFERRIN SERPL-MCNC: 264 MG/DL (ref 200–375)
WBC # BLD AUTO: 7.72 K/UL (ref 3.9–12.7)

## 2023-05-08 PROCEDURE — 3008F PR BODY MASS INDEX (BMI) DOCUMENTED: ICD-10-PCS | Mod: CPTII,S$GLB,, | Performed by: INTERNAL MEDICINE

## 2023-05-08 PROCEDURE — 1160F PR REVIEW ALL MEDS BY PRESCRIBER/CLIN PHARMACIST DOCUMENTED: ICD-10-PCS | Mod: CPTII,S$GLB,, | Performed by: INTERNAL MEDICINE

## 2023-05-08 PROCEDURE — 80053 COMPREHEN METABOLIC PANEL: CPT | Performed by: INTERNAL MEDICINE

## 2023-05-08 PROCEDURE — 99999 PR PBB SHADOW E&M-EST. PATIENT-LVL III: ICD-10-PCS | Mod: PBBFAC,,, | Performed by: INTERNAL MEDICINE

## 2023-05-08 PROCEDURE — 85652 RBC SED RATE AUTOMATED: CPT | Performed by: INTERNAL MEDICINE

## 2023-05-08 PROCEDURE — 36415 COLL VENOUS BLD VENIPUNCTURE: CPT | Mod: PN | Performed by: INTERNAL MEDICINE

## 2023-05-08 PROCEDURE — 3051F PR MOST RECENT HEMOGLOBIN A1C LEVEL 7.0 - < 8.0%: ICD-10-PCS | Mod: CPTII,S$GLB,, | Performed by: INTERNAL MEDICINE

## 2023-05-08 PROCEDURE — 1160F RVW MEDS BY RX/DR IN RCRD: CPT | Mod: CPTII,S$GLB,, | Performed by: INTERNAL MEDICINE

## 2023-05-08 PROCEDURE — 83036 HEMOGLOBIN GLYCOSYLATED A1C: CPT | Performed by: INTERNAL MEDICINE

## 2023-05-08 PROCEDURE — 3074F SYST BP LT 130 MM HG: CPT | Mod: CPTII,S$GLB,, | Performed by: INTERNAL MEDICINE

## 2023-05-08 PROCEDURE — 84466 ASSAY OF TRANSFERRIN: CPT | Performed by: INTERNAL MEDICINE

## 2023-05-08 PROCEDURE — 1159F PR MEDICATION LIST DOCUMENTED IN MEDICAL RECORD: ICD-10-PCS | Mod: CPTII,S$GLB,, | Performed by: INTERNAL MEDICINE

## 2023-05-08 PROCEDURE — 3074F PR MOST RECENT SYSTOLIC BLOOD PRESSURE < 130 MM HG: ICD-10-PCS | Mod: CPTII,S$GLB,, | Performed by: INTERNAL MEDICINE

## 2023-05-08 PROCEDURE — 3051F HG A1C>EQUAL 7.0%<8.0%: CPT | Mod: CPTII,S$GLB,, | Performed by: INTERNAL MEDICINE

## 2023-05-08 PROCEDURE — 3078F DIAST BP <80 MM HG: CPT | Mod: CPTII,S$GLB,, | Performed by: INTERNAL MEDICINE

## 2023-05-08 PROCEDURE — 3078F PR MOST RECENT DIASTOLIC BLOOD PRESSURE < 80 MM HG: ICD-10-PCS | Mod: CPTII,S$GLB,, | Performed by: INTERNAL MEDICINE

## 2023-05-08 PROCEDURE — 3072F PR LOW RISK FOR RETINOPATHY: ICD-10-PCS | Mod: CPTII,S$GLB,, | Performed by: INTERNAL MEDICINE

## 2023-05-08 PROCEDURE — 1159F MED LIST DOCD IN RCRD: CPT | Mod: CPTII,S$GLB,, | Performed by: INTERNAL MEDICINE

## 2023-05-08 PROCEDURE — 3008F BODY MASS INDEX DOCD: CPT | Mod: CPTII,S$GLB,, | Performed by: INTERNAL MEDICINE

## 2023-05-08 PROCEDURE — 3072F LOW RISK FOR RETINOPATHY: CPT | Mod: CPTII,S$GLB,, | Performed by: INTERNAL MEDICINE

## 2023-05-08 PROCEDURE — 85027 COMPLETE CBC AUTOMATED: CPT | Performed by: INTERNAL MEDICINE

## 2023-05-08 PROCEDURE — 84439 ASSAY OF FREE THYROXINE: CPT | Performed by: INTERNAL MEDICINE

## 2023-05-08 PROCEDURE — 86140 C-REACTIVE PROTEIN: CPT | Performed by: INTERNAL MEDICINE

## 2023-05-08 PROCEDURE — 80053 COMPREHEN METABOLIC PANEL: CPT | Mod: 91 | Performed by: INTERNAL MEDICINE

## 2023-05-08 PROCEDURE — 99214 PR OFFICE/OUTPT VISIT, EST, LEVL IV, 30-39 MIN: ICD-10-PCS | Mod: S$GLB,,, | Performed by: INTERNAL MEDICINE

## 2023-05-08 PROCEDURE — 84443 ASSAY THYROID STIM HORMONE: CPT | Performed by: INTERNAL MEDICINE

## 2023-05-08 PROCEDURE — 99214 OFFICE O/P EST MOD 30 MIN: CPT | Mod: S$GLB,,, | Performed by: INTERNAL MEDICINE

## 2023-05-08 PROCEDURE — 99999 PR PBB SHADOW E&M-EST. PATIENT-LVL III: CPT | Mod: PBBFAC,,, | Performed by: INTERNAL MEDICINE

## 2023-05-08 RX ORDER — SEMAGLUTIDE 1.34 MG/ML
1 INJECTION, SOLUTION SUBCUTANEOUS
Qty: 9 ML | Refills: 1 | Status: SHIPPED | OUTPATIENT
Start: 2023-05-08 | End: 2023-10-16

## 2023-05-08 NOTE — TELEPHONE ENCOUNTER
----- Message from Zamzam Schuler sent at 5/8/2023 11:15 AM CDT -----  Regarding: pt called  Name of Who is Calling: CHER HATFIELD [[id number]]      What is the request in detail: semaglutide (OZEMPIC) 1 mg/dose (4 mg/3 mL) needs a prior auth that the patient is diabetic before they release the medication to the patient. Please advise       Can the clinic reply by [facility]: No      [facility]: [phone]

## 2023-05-08 NOTE — TELEPHONE ENCOUNTER
----- Message from Anabelle Tavarez sent at 5/8/2023  1:47 PM CDT -----  Regarding: pt call back  Name of Who is Calling: CHER HATFIELD [[id number]]        What is the request in detail: Pt needs a prior authorization for her medication, please advise.     semaglutide (OZEMPIC) 1 mg/dose (4 mg/3 mL)      Can the clinic reply by [facility]: no           What Number to Call Back if not in [facility]: [phone] ([facility] care team) to [employer] insurance to pay for medication         [id number] [address] AT [address] [address]  Phone: [phone] Fax: [phone]

## 2023-05-08 NOTE — PROGRESS NOTES
Subjective:       Patient ID: Domi Leary is a 58 y.o. female.    Chief Complaint: Follow-up   HPI    Gyn: 2019 Dr Sanchez menopause   MM/21 due   Dexa: never / rx   Colonoscopy:  years ago at [facility]- will due in future   Immunizations: Flu: D Tdap:   due next visit Pneumovax:   Prevnar 13:  2016 Shingles:  Recommend Covid:    Smoker:  Former  Eye: O      f/u previous Dr. Nicole patient     Diabetes type 2:  A1c 7.8 /  Prev A1c 9.0 // issues getting rx Ozempic has been out  to March. Rx Ozempic 1 mg, Glimepiride 4 q.d., Synjardy 1 q.d.  (SE: Metformin ER 1000 diarrhea. 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  // seen Dr Calle past      Metabolic syndrome BMI 33 & 187, 31 & 176, 32 & 181.      ____________________________________________________________________________________________________  Assessment & Plan:  1. Type 2 diabetes mellitus with hyperglycemia, without long-term current use of insulin  - Comprehensive Metabolic Panel; Future  - Hemoglobin A1C; Future    2. Seropositive rheumatoid arthritis of multiple sites    3. Iron deficiency  - CBC Without Differential; Future  - Iron and TIBC; Future  - ferrous sulfate 325 (65 FE) MG EC tablet; Take 1 tablet (325 mg total) by mouth before evening meal.  Dispense: 90 tablet; Refill: 0  - Iron and TIBC; Future    4. Hypothyroidism due to acquired atrophy of thyroid  - TSH; Future  - T4, Free; Future  - TSH; Future    5. Mixed hyperlipidemia  - Lipid Panel; Future    6. Immunosuppression    7. Type 2 diabetes mellitus with diabetic neuropathy, without long-term current use of insulin  - semaglutide (OZEMPIC) 1 mg/dose (4 mg/3 mL); Inject 1 mg into  the skin every 7 days.  Dispense: 9 mL; Refill: 1  - Comprehensive Metabolic Panel; Future  - Hemoglobin A1C; Future  - pravastatin (PRAVACHOL) 10 MG tablet; Take 1 tablet (10 mg total) by mouth once daily.  Dispense: 90 tablet; Refill: 2    8. Wellness examination  - CBC Without Differential; Future  - Comprehensive Metabolic Panel; Future  - Hemoglobin A1C; Future  - TSH; Future  - T4, Free; Future  - Iron and TIBC; Future  - Iron and TIBC; Future  - Comprehensive Metabolic Panel; Future  - Hemoglobin A1C; Future  - Lipid Panel; Future  - TSH; Future     Type 2 diabetes mellitus with hyperglycemia, without long-term current use of insulin  -     Comprehensive Metabolic Panel; Future; Expected date: 05/10/2023  -     Hemoglobin A1C; Future; Expected date: 05/10/2023    Seropositive rheumatoid arthritis of multiple sites    Iron deficiency  -     CBC Without Differential; Future; Expected date: 05/08/2023  -     Iron and TIBC; Future; Expected date: 05/08/2023  -     ferrous sulfate 325 (65 FE) MG EC tablet; Take 1 tablet (325 mg total) by mouth before evening meal.  Dispense: 90 tablet; Refill: 0  -     Iron and TIBC; Future; Expected date: 05/10/2023    Hypothyroidism due to acquired atrophy of thyroid  -     TSH; Future; Expected date: 05/08/2023  -     T4, Free; Future; Expected date: 05/08/2023  -     TSH; Future; Expected date: 05/10/2023    Mixed hyperlipidemia  -     Lipid Panel; Future; Expected date: 05/10/2023    Immunosuppression    Type 2 diabetes mellitus with diabetic neuropathy, without long-term current use of insulin  -     semaglutide (OZEMPIC) 1 mg/dose (4 mg/3 mL); Inject 1 mg into the skin every 7 days.  Dispense: 9 mL; Refill: 1  -     Comprehensive Metabolic Panel; Future; Expected date: 05/08/2023  -     Hemoglobin A1C; Future; Expected date: 05/08/2023  -     pravastatin (PRAVACHOL) 10 MG tablet; Take 1 tablet (10 mg total) by mouth once daily.  Dispense: 90 tablet; Refill: 2    Wellness  examination  -     CBC Without Differential; Future; Expected date: 05/08/2023  -     Comprehensive Metabolic Panel; Future; Expected date: 05/08/2023  -     Hemoglobin A1C; Future; Expected date: 05/08/2023  -     TSH; Future; Expected date: 05/08/2023  -     T4, Free; Future; Expected date: 05/08/2023  -     Iron and TIBC; Future; Expected date: 05/08/2023  -     Iron and TIBC; Future; Expected date: 05/10/2023  -     Comprehensive Metabolic Panel; Future; Expected date: 05/10/2023  -     Hemoglobin A1C; Future; Expected date: 05/10/2023  -     Lipid Panel; Future; Expected date: 05/10/2023  -     TSH; Future; Expected date: 05/10/2023        Continue to work on regular exercise, maintain healthy weight, balanced diet. Avoid unhealthy habits: smoking, excessive alcohol intake.     Disclaimer: This note was partly generated using dictation software which may occasionally result in transcription errors  ____________________________________________________________________________________________________  Review of Systems:  Review of Systems   Constitutional:  Negative for appetite change.   HENT:  Negative for nosebleeds.    Eyes:  Negative for pain.   Respiratory:  Negative for choking.    Cardiovascular:  Negative for chest pain.   Gastrointestinal:  Negative for blood in stool.   Genitourinary:  Negative for hematuria.   Musculoskeletal:  Negative for joint swelling.   Skin:  Negative for pallor.   Neurological:  Negative for facial asymmetry.   Hematological:  Does not bruise/bleed easily.   Psychiatric/Behavioral:  Negative for confusion.      Objective:     Wt Readings from Last 3 Encounters:   05/08/23 82.3 kg (181 lb 7 oz)   03/21/23 79.8 kg (176 lb)   11/07/22 80 kg (176 lb 7.7 oz)     BP Readings from Last 3 Encounters:   05/08/23 128/72   03/23/23 134/79   11/07/22 114/76       Lab Results   Component Value Date    WBC 7.72 05/08/2023    HGB 14.7 05/08/2023    HCT 45.9 05/08/2023     05/08/2023      05/08/2023     05/08/2023    K 4.3 05/08/2023    K 4.2 05/08/2023     05/08/2023     05/08/2023    ALT 45 (H) 05/08/2023    ALT 45 (H) 05/08/2023    AST 26 05/08/2023    AST 25 05/08/2023    CO2 28 05/08/2023    CO2 27 05/08/2023    CREATININE 0.9 05/08/2023    CREATININE 0.9 05/08/2023    BUN 11 05/08/2023    BUN 10 05/08/2023     (H) 05/08/2023     (H) 05/08/2023      Hemoglobin A1C   Date Value Ref Range Status   05/08/2023 7.8 (H) 4.0 - 5.6 % Final     Comment:     ADA Screening Guidelines:  5.7-6.4%  Consistent with prediabetes  >or=6.5%  Consistent with diabetes    High levels of fetal hemoglobin interfere with the HbA1C  assay. Heterozygous hemoglobin variants (HbS, HgC, etc)do  not significantly interfere with this assay.   However, presence of multiple variants may affect accuracy.     10/29/2022 7.7 (H) 4.0 - 5.6 % Final     Comment:     ADA Screening Guidelines:  5.7-6.4%  Consistent with prediabetes  >or=6.5%  Consistent with diabetes    High levels of fetal hemoglobin interfere with the HbA1C  assay. Heterozygous hemoglobin variants (HbS, HgC, etc)do  not significantly interfere with this assay.   However, presence of multiple variants may affect accuracy.     08/11/2021 9.0 (H) 4.0 - 5.6 % Final     Comment:     ADA Screening Guidelines:  5.7-6.4%  Consistent with prediabetes  >or=6.5%  Consistent with diabetes    High levels of fetal hemoglobin interfere with the HbA1C  assay. Heterozygous hemoglobin variants (HbS, HgC, etc)do  not significantly interfere with this assay.   However, presence of multiple variants may affect accuracy.        Lab Results   Component Value Date    TSH 2.732 05/08/2023    TSH 0.143 (L) 10/29/2022    TSH 3.507 05/06/2021     Lab Results   Component Value Date    FREET4 1.00 05/08/2023    FREET4 1.13 10/29/2022    FREET4 0.97 03/03/2021     Lab Results   Component Value Date    LDLCALC 45.0 (L) 10/29/2022    LDLCALC 50.2 (L) 08/11/2021     LDLCALC 85.4 03/03/2021     Lab Results   Component Value Date    TRIG 80 10/29/2022    TRIG 144 08/11/2021    TRIG 288 (H) 03/03/2021            Physical Exam  Constitutional:       Appearance: Normal appearance.   HENT:      Head: Normocephalic and atraumatic.   Eyes:      Extraocular Movements: Extraocular movements intact.      Pupils: Pupils are equal, round, and reactive to light.   Cardiovascular:      Rate and Rhythm: Normal rate.   Pulmonary:      Effort: Pulmonary effort is normal.   Neurological:      Mental Status: She is alert and oriented to person, place, and time.   Psychiatric:         Mood and Affect: Mood normal.       Medication List with Changes/Refills   New Medications    FERROUS SULFATE 325 (65 FE) MG EC TABLET    Take 1 tablet (325 mg total) by mouth before evening meal.    SEMAGLUTIDE (OZEMPIC) 1 MG/DOSE (4 MG/3 ML)    Inject 1 mg into the skin every 7 days.   Current Medications    HYDROXYCHLOROQUINE (PLAQUENIL) 200 MG TABLET    TAKE 1 TABLET(200 MG) BY MOUTH TWICE DAILY    LEFLUNOMIDE (ARAVA) 20 MG TAB    TAKE 1 TABLET(20 MG) BY MOUTH EVERY DAY    LEVOTHYROXINE (SYNTHROID) 150 MCG TABLET    Take 1 tablet (150 mcg total) by mouth once daily.   Changed and/or Refilled Medications    Modified Medication Previous Medication    GLIMEPIRIDE (AMARYL) 4 MG TABLET glimepiride (AMARYL) 4 MG tablet       TAKE 1 TABLET(4 MG) BY MOUTH DAILY WITH BREAKFAST    Take 1 tablet (4 mg total) by mouth daily with breakfast.    PRAVASTATIN (PRAVACHOL) 10 MG TABLET pravastatin (PRAVACHOL) 10 MG tablet       Take 1 tablet (10 mg total) by mouth once daily.    Take 1 tablet (10 mg total) by mouth once daily.    SYNJARDY XR 12.5-1,000 MG TBPH empagliflozin-metformin (SYNJARDY XR) 12.5-1,000 mg TBph       TAKE 1 TABLET BY MOUTH DAILY    Take 1 tablet by mouth Daily. ((Due labs))   Discontinued Medications    SEMAGLUTIDE (OZEMPIC) 1 MG/DOSE (4 MG/3 ML)    INJECT 1 MG UNDER THE SKIN ONCE WEEKLY( EVERY 7 DAYS).  ((Due labs & visit for refills

## 2023-05-08 NOTE — TELEPHONE ENCOUNTER
----- Message from Td Galarza sent at 5/2/2023 11:10 AM CDT -----  Regarding: Rx  Contact: CHER HATFIELD [[id number]]  Type:  Pharmacy Calling to Clarify an RX    Name of Caller:  [facility]    Pharmacy Name:  [facility]    Prescription Name:  Ozempic 1 mg    What do they need to clarify?:  Eligibility Error on PA    Best Call Back Number:  [phone]    Additional Information:  Reference Key: [id number]. Please call to advise.

## 2023-05-09 ENCOUNTER — TELEPHONE (OUTPATIENT)
Dept: FAMILY MEDICINE | Facility: CLINIC | Age: 58
End: 2023-05-09
Payer: COMMERCIAL

## 2023-05-09 DIAGNOSIS — E11.40 TYPE 2 DIABETES MELLITUS WITH DIABETIC NEUROPATHY, WITHOUT LONG-TERM CURRENT USE OF INSULIN: ICD-10-CM

## 2023-05-09 LAB
T4 FREE SERPL-MCNC: 1 NG/DL (ref 0.71–1.51)
TSH SERPL DL<=0.005 MIU/L-ACNC: 2.73 UIU/ML (ref 0.4–4)

## 2023-05-09 RX ORDER — EMPAGLIFLOZIN, METFORMIN HYDROCHLORIDE 12.5; 1 MG/1; MG/1
TABLET, EXTENDED RELEASE ORAL
Qty: 90 TABLET | Refills: 1 | Status: SHIPPED | OUTPATIENT
Start: 2023-05-09 | End: 2023-12-04

## 2023-05-09 RX ORDER — GLIMEPIRIDE 4 MG/1
TABLET ORAL
Qty: 90 TABLET | Refills: 1 | Status: SHIPPED | OUTPATIENT
Start: 2023-05-09 | End: 2023-09-13

## 2023-05-09 NOTE — TELEPHONE ENCOUNTER
Refill Authorization Note     Refill Decision Note   Domi Leary  is requesting a refill authorization.  Brief Assessment and Rationale for Refill:  Approve     Medication Therapy Plan:       Medication Reconciliation Completed: No   Comments:     No Care Gaps recommended.     Note composed:12:15 PM 05/09/2023

## 2023-05-09 NOTE — TELEPHONE ENCOUNTER
No care due was identified.  [facility] Embedded Care Due Messages. Reference number: [id number].   5/09/2023 6:00:06 AM CDT

## 2023-05-09 NOTE — TELEPHONE ENCOUNTER
Synjardy PA submitted via Cover My Meds.     Your demographic data has been sent to [facility] successfully!    [facility] typically takes 5-10 minutes to respond, but it may take a little longer in some cases. You will be notified by email when available. You can also check for an update later by opening this request from your dashboard. Please do not fax or call [facility] to resubmit this request. If you need assistance, please chat with [employer] or call us at [phone].    If it has been longer than 24 hours, please reach out to [facility].

## 2023-05-10 PROBLEM — E61.1 IRON DEFICIENCY: Status: ACTIVE | Noted: 2023-05-10

## 2023-05-10 RX ORDER — PRAVASTATIN SODIUM 10 MG/1
10 TABLET ORAL DAILY
Qty: 90 TABLET | Refills: 2 | Status: SHIPPED | OUTPATIENT
Start: 2023-05-10 | End: 2024-02-08 | Stop reason: SDUPTHER

## 2023-05-10 RX ORDER — FERROUS SULFATE 325(65) MG
325 TABLET, DELAYED RELEASE (ENTERIC COATED) ORAL
Qty: 90 TABLET | Refills: 0 | Status: SHIPPED | OUTPATIENT
Start: 2023-05-10 | End: 2023-07-25

## 2023-05-31 ENCOUNTER — PATIENT MESSAGE (OUTPATIENT)
Dept: FAMILY MEDICINE | Facility: CLINIC | Age: 58
End: 2023-05-31
Payer: COMMERCIAL

## 2023-05-31 DIAGNOSIS — E11.40 TYPE 2 DIABETES MELLITUS WITH DIABETIC NEUROPATHY, WITHOUT LONG-TERM CURRENT USE OF INSULIN: ICD-10-CM

## 2023-05-31 RX ORDER — EMPAGLIFLOZIN, METFORMIN HYDROCHLORIDE 12.5; 1 MG/1; MG/1
1 TABLET, EXTENDED RELEASE ORAL DAILY
Qty: 90 TABLET | Refills: 1 | OUTPATIENT
Start: 2023-05-31

## 2023-05-31 RX ORDER — SEMAGLUTIDE 1.34 MG/ML
1 INJECTION, SOLUTION SUBCUTANEOUS
Qty: 9 ML | Refills: 1 | OUTPATIENT
Start: 2023-05-31 | End: 2024-05-30

## 2023-05-31 NOTE — TELEPHONE ENCOUNTER
No care due was identified.  [facility] Embedded Care Due Messages. Reference number: [id number].   5/31/2023 11:19:13 AM CDT

## 2023-06-03 ENCOUNTER — TELEPHONE (OUTPATIENT)
Dept: FAMILY MEDICINE | Facility: CLINIC | Age: 58
End: 2023-06-03
Payer: COMMERCIAL

## 2023-06-07 ENCOUNTER — TELEPHONE (OUTPATIENT)
Dept: FAMILY MEDICINE | Facility: CLINIC | Age: 58
End: 2023-06-07
Payer: COMMERCIAL

## 2023-06-07 NOTE — TELEPHONE ENCOUNTER
Synjardy PA submitted via [employer] My [employer].-- awaiting a response     [facility] typically takes 5-10 minutes to respond, but it may take a little longer in some cases. You will be notified by email when available. You can also check for an update later by opening this request from your dashboard. Please do not fax or call [facility] to resubmit this request. If you need assistance, please chat with [employer] or call us at [phone].    If it has been longer than 24 hours, please reach out to [facility]

## 2023-06-07 NOTE — TELEPHONE ENCOUNTER
Were pleased to let you know that weve approved your or your doctors request for coverage  for OZEMPIC 4MG/3ML PEN. You can now fill your prescription, and it will be covered  according to your plan

## 2023-06-08 DIAGNOSIS — M05.79 SEROPOSITIVE RHEUMATOID ARTHRITIS OF MULTIPLE SITES: ICD-10-CM

## 2023-06-11 RX ORDER — LEFLUNOMIDE 20 MG/1
TABLET ORAL
Qty: 90 TABLET | Refills: 3 | Status: SHIPPED | OUTPATIENT
Start: 2023-06-11 | End: 2023-06-14 | Stop reason: SDUPTHER

## 2023-06-12 ENCOUNTER — TELEPHONE (OUTPATIENT)
Dept: FAMILY MEDICINE | Facility: CLINIC | Age: 58
End: 2023-06-12
Payer: COMMERCIAL

## 2023-06-14 ENCOUNTER — OFFICE VISIT (OUTPATIENT)
Dept: RHEUMATOLOGY | Facility: CLINIC | Age: 58
End: 2023-06-14
Payer: COMMERCIAL

## 2023-06-14 VITALS
BODY MASS INDEX: 32.4 KG/M2 | DIASTOLIC BLOOD PRESSURE: 74 MMHG | HEART RATE: 83 BPM | WEIGHT: 182.88 LBS | SYSTOLIC BLOOD PRESSURE: 118 MMHG | HEIGHT: 63 IN

## 2023-06-14 DIAGNOSIS — Z90.81 ASPLENIA AFTER SURGICAL PROCEDURE: ICD-10-CM

## 2023-06-14 DIAGNOSIS — M05.79 SEROPOSITIVE RHEUMATOID ARTHRITIS OF MULTIPLE SITES: Primary | ICD-10-CM

## 2023-06-14 DIAGNOSIS — Z79.899 HIGH RISK MEDICATIONS (NOT ANTICOAGULANTS) LONG-TERM USE: ICD-10-CM

## 2023-06-14 DIAGNOSIS — Z79.60 LONG-TERM USE OF IMMUNOSUPPRESSANT MEDICATION: ICD-10-CM

## 2023-06-14 PROCEDURE — 1159F MED LIST DOCD IN RCRD: CPT | Mod: CPTII,S$GLB,, | Performed by: INTERNAL MEDICINE

## 2023-06-14 PROCEDURE — 3074F SYST BP LT 130 MM HG: CPT | Mod: CPTII,S$GLB,, | Performed by: INTERNAL MEDICINE

## 2023-06-14 PROCEDURE — 3074F PR MOST RECENT SYSTOLIC BLOOD PRESSURE < 130 MM HG: ICD-10-PCS | Mod: CPTII,S$GLB,, | Performed by: INTERNAL MEDICINE

## 2023-06-14 PROCEDURE — 3051F HG A1C>EQUAL 7.0%<8.0%: CPT | Mod: CPTII,S$GLB,, | Performed by: INTERNAL MEDICINE

## 2023-06-14 PROCEDURE — 99999 PR PBB SHADOW E&M-EST. PATIENT-LVL III: CPT | Mod: PBBFAC,,, | Performed by: INTERNAL MEDICINE

## 2023-06-14 PROCEDURE — 3008F BODY MASS INDEX DOCD: CPT | Mod: CPTII,S$GLB,, | Performed by: INTERNAL MEDICINE

## 2023-06-14 PROCEDURE — 3078F DIAST BP <80 MM HG: CPT | Mod: CPTII,S$GLB,, | Performed by: INTERNAL MEDICINE

## 2023-06-14 PROCEDURE — 3008F PR BODY MASS INDEX (BMI) DOCUMENTED: ICD-10-PCS | Mod: CPTII,S$GLB,, | Performed by: INTERNAL MEDICINE

## 2023-06-14 PROCEDURE — 3072F LOW RISK FOR RETINOPATHY: CPT | Mod: CPTII,S$GLB,, | Performed by: INTERNAL MEDICINE

## 2023-06-14 PROCEDURE — 99999 PR PBB SHADOW E&M-EST. PATIENT-LVL III: ICD-10-PCS | Mod: PBBFAC,,, | Performed by: INTERNAL MEDICINE

## 2023-06-14 PROCEDURE — 1159F PR MEDICATION LIST DOCUMENTED IN MEDICAL RECORD: ICD-10-PCS | Mod: CPTII,S$GLB,, | Performed by: INTERNAL MEDICINE

## 2023-06-14 PROCEDURE — 3072F PR LOW RISK FOR RETINOPATHY: ICD-10-PCS | Mod: CPTII,S$GLB,, | Performed by: INTERNAL MEDICINE

## 2023-06-14 PROCEDURE — 99215 OFFICE O/P EST HI 40 MIN: CPT | Mod: S$GLB,,, | Performed by: INTERNAL MEDICINE

## 2023-06-14 PROCEDURE — 3051F PR MOST RECENT HEMOGLOBIN A1C LEVEL 7.0 - < 8.0%: ICD-10-PCS | Mod: CPTII,S$GLB,, | Performed by: INTERNAL MEDICINE

## 2023-06-14 PROCEDURE — 99215 PR OFFICE/OUTPT VISIT, EST, LEVL V, 40-54 MIN: ICD-10-PCS | Mod: S$GLB,,, | Performed by: INTERNAL MEDICINE

## 2023-06-14 PROCEDURE — 3078F PR MOST RECENT DIASTOLIC BLOOD PRESSURE < 80 MM HG: ICD-10-PCS | Mod: CPTII,S$GLB,, | Performed by: INTERNAL MEDICINE

## 2023-06-14 RX ORDER — LEFLUNOMIDE 20 MG/1
20 TABLET ORAL DAILY
Qty: 90 TABLET | Refills: 1 | Status: SHIPPED | OUTPATIENT
Start: 2023-06-14 | End: 2023-12-14 | Stop reason: SDUPTHER

## 2023-06-14 RX ORDER — HYDROXYCHLOROQUINE SULFATE 200 MG/1
TABLET, FILM COATED ORAL
Qty: 60 TABLET | Refills: 6 | Status: SHIPPED | OUTPATIENT
Start: 2023-06-14 | End: 2023-12-14 | Stop reason: SDUPTHER

## 2023-06-14 RX ORDER — TRAMADOL HYDROCHLORIDE 50 MG/1
50 TABLET ORAL EVERY 12 HOURS PRN
Qty: 120 TABLET | Refills: 3 | Status: SHIPPED | OUTPATIENT
Start: 2023-06-14 | End: 2023-12-14 | Stop reason: SDUPTHER

## 2023-06-14 ASSESSMENT — ROUTINE ASSESSMENT OF PATIENT INDEX DATA (RAPID3)
TOTAL RAPID3 SCORE: 3.56
MDHAQ FUNCTION SCORE: 0.5
FATIGUE SCORE: 0
PATIENT GLOBAL ASSESSMENT SCORE: 3
PAIN SCORE: 6
PSYCHOLOGICAL DISTRESS SCORE: 1.1

## 2023-06-14 NOTE — PROGRESS NOTES
Subjective:          Chief Complaint: Domi Leary is a 58 y.o. female who had concerns including Disease Management.    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  Unfortunately by 2009 her arthritis flaring significantly  Seen last in 5/2021    Current:  LFA 20mg will not fill w/o labs but appears her PCP did   HCQ 200mg BID (overdue for eye exam)       Improved swelling  MCP 1st on right and   5th on left (injection 5/2019). And persistent swelling at the left 5th MCP but not painful.      Feet, knees hips. This visit ok.     AM stiffness: <30 min   Worst for her is at night sitting still  Some dry eye, no dry mouth  Previous medications    Enbrel: diffuse psoriasis after starting Enbrel. Resolved with holding.       Component      Latest Ref Rng & Units 1/31/2018 9/4/2009   PIA      Neg <1:160  Negative   CCP Antibodies      <5.0 U/mL  637.7   CRP      0.0 - 8.2 mg/L 12.8 (H) 32.2 (H)   Sed Rate      0 - 20 mm/Hr 12 23 (H)   Rheumatoid Factor      0 - 15 IU/ml  157 (H)     REVIEW OF SYSTEMS:    Review of Systems   Constitutional:  Negative for fever, malaise/fatigue and weight loss.   HENT:  Negative for sore throat.    Eyes:  Negative for double vision, photophobia and redness.   Respiratory:  Negative for cough, shortness of breath and wheezing.    Cardiovascular:  Negative for chest pain, palpitations and orthopnea.   Gastrointestinal:  Negative for abdominal pain, constipation and diarrhea.   Genitourinary:  Negative for dysuria, hematuria and urgency.   Musculoskeletal:  Positive for joint pain. Negative for back pain and myalgias.   Skin:  Negative for rash.   Neurological:  Negative for dizziness, tingling, focal weakness and headaches.    Endo/Heme/Allergies:  Does not bruise/bleed easily.   Psychiatric/Behavioral:  Negative for depression, hallucinations and suicidal ideas.              Objective:            Past Medical History:   Diagnosis Date    Diabetes 2012    Hypothyroid 2012    Rheumatoid arthritis 2012     Family History   Problem Relation Age of Onset    No Known Problems Mother     Ulcerative colitis Father     Cataracts Paternal Grandmother     Diabetes Maternal Aunt     Breast cancer Maternal Aunt     Glaucoma Neg Hx     Macular degeneration Neg Hx     Retinal detachment Neg Hx      Social History     Tobacco Use    Smoking status: Former     Types: Cigarettes     Quit date: 2009     Years since quittin.3    Smokeless tobacco: Never   Substance Use Topics    Alcohol use: Yes     Comment: occassionally    Drug use: No         Current Outpatient Medications on File Prior to Visit   Medication Sig Dispense Refill    ferrous sulfate 325 (65 FE) MG EC tablet Take 1 tablet (325 mg total) by mouth before evening meal. 90 tablet 0    glimepiride (AMARYL) 4 MG tablet TAKE 1 TABLET(4 MG) BY MOUTH DAILY WITH BREAKFAST 90 tablet 1    hydrOXYchloroQUINE (PLAQUENIL) 200 mg tablet TAKE 1 TABLET(200 MG) BY MOUTH TWICE DAILY 60 tablet 6    leflunomide (ARAVA) 20 MG Tab TAKE 1 TABLET(20 MG) BY MOUTH EVERY DAY 90 tablet 3    levothyroxine (SYNTHROID) 150 MCG tablet Take 1 tablet (150 mcg total) by mouth once daily. 90 tablet 3    pravastatin (PRAVACHOL) 10 MG tablet Take 1 tablet (10 mg total) by mouth once daily. 90 tablet 2    semaglutide (OZEMPIC) 1 mg/dose (4 mg/3 mL) Inject 1 mg into the skin every 7 days. 9 mL 1    SYNJARDY XR 12.5-1,000 mg TBph TAKE 1 TABLET BY MOUTH DAILY 90 tablet 1     No current facility-administered medications on file prior to visit.       Vitals:    23 0938   BP: 118/74   Pulse: 83       Physical Exam:    Physical Exam  Constitutional:       Appearance: She is well-developed.   HENT:       Head: Normocephalic and atraumatic.   Eyes:      Pupils: Pupils are equal, round, and reactive to light.   Cardiovascular:      Rate and Rhythm: Normal rate and regular rhythm.      Heart sounds: Normal heart sounds.   Pulmonary:      Effort: Pulmonary effort is normal.      Breath sounds: Normal breath sounds.   Musculoskeletal:      Right shoulder: No swelling or tenderness. Normal range of motion.      Left shoulder: No swelling or tenderness. Normal range of motion.      Right elbow: No swelling. Normal range of motion. No tenderness.      Left elbow: No swelling. Normal range of motion. No tenderness.      Right wrist: No swelling or tenderness. Normal range of motion.      Left wrist: No swelling or tenderness. Normal range of motion.      Right hand: Swelling and tenderness present. Normal range of motion.      Left hand: Swelling and tenderness present. Normal range of motion.      Cervical back: Normal range of motion.      Right knee: No swelling. Normal range of motion. No tenderness.      Left knee: No swelling. Normal range of motion. No tenderness.      Right foot: Normal range of motion. No swelling or tenderness.      Left foot: Normal range of motion. No swelling or tenderness.      Comments: Right 1MCP no synovitis   Left 5th MCP synovitis interdigital space. Mild TTP.   Early swan neck deformity.     + finkelstein's right wrist.      Skin:     General: Skin is warm and dry.   Neurological:      Mental Status: She is alert and oriented to person, place, and time.   Psychiatric:         Behavior: Behavior normal.     Component      Latest Ref Rng & Units 8/8/2018   Sed Rate      0 - 20 mm/Hr 15   CRP      0.0 - 8.2 mg/L 10.0 (H)   Rheumatoid Factor      0.0 - 15.0 IU/mL 85.0 (H)   CCP Antibodies      <5.0 U/mL 646.5 (H)             Assessment:       Encounter Diagnoses   Name Primary?    Seropositive rheumatoid arthritis of multiple sites Yes    Long-term use of immunosuppressant medication      Asplenia after surgical procedure     High risk medications (not anticoagulants) long-term use           Plan:        Seropositive rheumatoid arthritis of multiple sites  -     leflunomide (ARAVA) 20 MG Tab; Take 1 tablet (20 mg total) by mouth once daily. MUST have labs regularly to continue this medication.  Dispense: 90 tablet; Refill: 1  -     CBC Auto Differential; Standing  -     Comprehensive Metabolic Panel; Standing  -     Sedimentation rate; Standing  -     C-Reactive Protein; Standing  -     hydrOXYchloroQUINE (PLAQUENIL) 200 mg tablet; TAKE 1 TABLET(200 MG) BY MOUTH TWICE DAILY  Dispense: 60 tablet; Refill: 6  -     traMADoL (ULTRAM) 50 mg tablet; Take 1 tablet (50 mg total) by mouth every 12 (twelve) hours as needed for Pain (rheumatoid arthritis).  Dispense: 120 tablet; Refill: 3    Long-term use of immunosuppressant medication  -     leflunomide (ARAVA) 20 MG Tab; Take 1 tablet (20 mg total) by mouth once daily. MUST have labs regularly to continue this medication.  Dispense: 90 tablet; Refill: 1  -     CBC Auto Differential; Standing  -     Comprehensive Metabolic Panel; Standing  -     Sedimentation rate; Standing  -     C-Reactive Protein; Standing    Asplenia after surgical procedure    High risk medications (not anticoagulants) long-term use  -     leflunomide (ARAVA) 20 MG Tab; Take 1 tablet (20 mg total) by mouth once daily. MUST have labs regularly to continue this medication.  Dispense: 90 tablet; Refill: 1  -     CBC Auto Differential; Standing  -     Comprehensive Metabolic Panel; Standing  -     Sedimentation rate; Standing  -     C-Reactive Protein; Standing         Very pleasant 56 y/o female with hx of sero+ RA and felty's s/p splenectomy   LOV 2019, RTC today only missed approximately 3 months of therapy over 2020 and doing well.    -continue LFA 20mg daily (must have regular labs and f/u  with this medication!)    -add HCQ 200mg BID (must have annual eye exam)   -karma  Diclofenac     Discussed with patient possibility for biologic therapy but having good response and tolerance with HCQ      Patient would like to avoid biologics if able.   No follow-ups on file.   F/u 4 months      40min consultation with greater than 50% of that time included Preparing to see the patient (review records, tests), Obtaining and/or reviewing separately obtained historical data, Performing a medically appropriate examination and/or evaluation , Ordering medications, tests, and/or procedures, Referring and communicating with other healthcare professionals , Documenting clinical information in the electronic or other health record and Independently interpreting results  (as warranted) & communicating results to the patient/family/caregiver. All questions answered.

## 2023-06-15 ENCOUNTER — DOCUMENTATION ONLY (OUTPATIENT)
Dept: PHARMACY | Facility: CLINIC | Age: 58
End: 2023-06-15
Payer: COMMERCIAL

## 2023-07-25 DIAGNOSIS — E61.1 IRON DEFICIENCY: ICD-10-CM

## 2023-07-25 RX ORDER — FERROUS SULFATE 325(65) MG
TABLET ORAL
Qty: 90 TABLET | Refills: 0 | Status: SHIPPED | OUTPATIENT
Start: 2023-07-25

## 2023-08-10 DIAGNOSIS — Z12.31 OTHER SCREENING MAMMOGRAM: ICD-10-CM

## 2023-08-14 ENCOUNTER — PATIENT MESSAGE (OUTPATIENT)
Dept: ADMINISTRATIVE | Facility: HOSPITAL | Age: 58
End: 2023-08-14
Payer: COMMERCIAL

## 2023-09-13 DIAGNOSIS — E11.40 TYPE 2 DIABETES MELLITUS WITH DIABETIC NEUROPATHY, WITHOUT LONG-TERM CURRENT USE OF INSULIN: ICD-10-CM

## 2023-09-13 DIAGNOSIS — E03.4 HYPOTHYROIDISM DUE TO ACQUIRED ATROPHY OF THYROID: ICD-10-CM

## 2023-09-13 RX ORDER — GLIMEPIRIDE 4 MG/1
4 TABLET ORAL
Qty: 90 TABLET | Refills: 0 | Status: SHIPPED | OUTPATIENT
Start: 2023-09-13 | End: 2024-02-08 | Stop reason: SDUPTHER

## 2023-09-13 RX ORDER — LEVOTHYROXINE SODIUM 150 UG/1
150 TABLET ORAL
Qty: 90 TABLET | Refills: 2 | Status: SHIPPED | OUTPATIENT
Start: 2023-09-13 | End: 2024-02-08 | Stop reason: SDUPTHER

## 2023-09-13 NOTE — TELEPHONE ENCOUNTER
Provider Staff:  Action required for this patient     Please see care gap opportunities below in Care Due Message.    Thanks!  [facility]     Appointments      Date Provider   Last Visit   5/8/2023 Rosales Boles MD   Next Visit   Visit date not found Rosales Boles MD     Refill Decision Note   Domi Leary  is requesting a refill authorization.  Brief Assessment and Rationale for Refill:  Approve     Medication Therapy Plan:         Comments:     Note composed:11:06 AM 09/13/2023

## 2023-09-13 NOTE — TELEPHONE ENCOUNTER
Care Due:                  Date            Visit Type   Department     Provider  --------------------------------------------------------------------------------                                EP -                              PRIMARY      [facility] FAMILY  Last Visit: 05-      CARE ([facility])   MEDICINE       Rosales Boles  Next Visit: None Scheduled  None         None Found                                                            Last  Test          Frequency    Reason                     Performed    Due Date  --------------------------------------------------------------------------------    HBA1C.......  6 months...  SYNJARDY, glimepiride,     05- 11-                             semaglutide..............    Lipid Panel.  12 months..  pravastatin..............  10-   10-    [facility] Embedded Care Due Messages. Reference number: [id number].   9/13/2023 11:04:38 AM CDT

## 2023-10-14 DIAGNOSIS — E11.40 TYPE 2 DIABETES MELLITUS WITH DIABETIC NEUROPATHY, WITHOUT LONG-TERM CURRENT USE OF INSULIN: ICD-10-CM

## 2023-10-14 NOTE — TELEPHONE ENCOUNTER
No care due was identified.  [facility] Embedded Care Due Messages. Reference number: [id number].   10/14/2023 11:36:00 AM CDT

## 2023-10-16 RX ORDER — SEMAGLUTIDE 1.34 MG/ML
INJECTION, SOLUTION SUBCUTANEOUS
Qty: 9 ML | Refills: 0 | Status: SHIPPED | OUTPATIENT
Start: 2023-10-16 | End: 2024-02-08 | Stop reason: SDUPTHER

## 2023-10-16 NOTE — TELEPHONE ENCOUNTER
Refill Decision Note   Domi Leary  is requesting a refill authorization.  Brief Assessment and Rationale for Refill:  Approve     Medication Therapy Plan:         Comments:     Note composed:2:50 AM 10/16/2023

## 2023-12-03 DIAGNOSIS — E78.2 MIXED HYPERLIPIDEMIA: ICD-10-CM

## 2023-12-03 DIAGNOSIS — Z00.00 WELLNESS EXAMINATION: Primary | ICD-10-CM

## 2023-12-03 DIAGNOSIS — E11.40 TYPE 2 DIABETES MELLITUS WITH DIABETIC NEUROPATHY, WITHOUT LONG-TERM CURRENT USE OF INSULIN: ICD-10-CM

## 2023-12-03 DIAGNOSIS — E61.1 IRON DEFICIENCY: ICD-10-CM

## 2023-12-03 DIAGNOSIS — E11.65 TYPE 2 DIABETES MELLITUS WITH HYPERGLYCEMIA, WITHOUT LONG-TERM CURRENT USE OF INSULIN: ICD-10-CM

## 2023-12-03 DIAGNOSIS — E03.4 HYPOTHYROIDISM DUE TO ACQUIRED ATROPHY OF THYROID: ICD-10-CM

## 2023-12-03 NOTE — TELEPHONE ENCOUNTER
Care Due:                  Date            Visit Type   Department     Provider  --------------------------------------------------------------------------------                                EP -                              PRIMARY      [facility] FAMILY  Last Visit: 05-      CARE ([facility])   MEDICINE       Rosales Boels  Next Visit: None Scheduled  None         None Found                                                            Last  Test          Frequency    Reason                     Performed    Due Date  --------------------------------------------------------------------------------    HBA1C.......  6 months...  TULIO SHAY,         05- 11-                             glimepiride..............    Lipid Panel.  12 months..  pravastatin..............  10-   10-    [facility] Embedded Care Due Messages. Reference number: [id number].   12/03/2023 6:58:27 AM CST

## 2023-12-04 RX ORDER — EMPAGLIFLOZIN, METFORMIN HYDROCHLORIDE 12.5; 1 MG/1; MG/1
1 TABLET, EXTENDED RELEASE ORAL DAILY
Qty: 30 TABLET | Refills: 0 | Status: SHIPPED | OUTPATIENT
Start: 2023-12-04 | End: 2024-02-08 | Stop reason: SDUPTHER

## 2023-12-04 NOTE — TELEPHONE ENCOUNTER
Refill Routing Note   Medication(s) are not appropriate for processing by [facility] for the following reason(s):        Required labs outdated    [facility] action(s):  Defer     Requires labs : Yes             Appointments  past 12m or future 3m with PCP    Date Provider   Last Visit   5/8/2023 Rosales Boles MD   Next Visit   Visit date not found Rosales Boles MD   ED visits in past 90 days: 0        Note composed:7:46 PM 12/03/2023

## 2023-12-14 ENCOUNTER — LAB VISIT (OUTPATIENT)
Dept: LAB | Facility: HOSPITAL | Age: 58
End: 2023-12-14
Attending: INTERNAL MEDICINE
Payer: COMMERCIAL

## 2023-12-14 ENCOUNTER — OFFICE VISIT (OUTPATIENT)
Dept: RHEUMATOLOGY | Facility: CLINIC | Age: 58
End: 2023-12-14
Payer: COMMERCIAL

## 2023-12-14 VITALS
WEIGHT: 181.44 LBS | HEART RATE: 90 BPM | SYSTOLIC BLOOD PRESSURE: 121 MMHG | DIASTOLIC BLOOD PRESSURE: 79 MMHG | BODY MASS INDEX: 32.15 KG/M2 | HEIGHT: 63 IN

## 2023-12-14 DIAGNOSIS — D84.9 IMMUNOSUPPRESSION: ICD-10-CM

## 2023-12-14 DIAGNOSIS — Z79.899 HIGH RISK MEDICATIONS (NOT ANTICOAGULANTS) LONG-TERM USE: ICD-10-CM

## 2023-12-14 DIAGNOSIS — Z79.60 LONG-TERM USE OF IMMUNOSUPPRESSANT MEDICATION: ICD-10-CM

## 2023-12-14 DIAGNOSIS — M05.79 SEROPOSITIVE RHEUMATOID ARTHRITIS OF MULTIPLE SITES: Primary | ICD-10-CM

## 2023-12-14 DIAGNOSIS — M05.79 SEROPOSITIVE RHEUMATOID ARTHRITIS OF MULTIPLE SITES: ICD-10-CM

## 2023-12-14 LAB
ALBUMIN SERPL BCP-MCNC: 3.5 G/DL (ref 3.5–5.2)
ALP SERPL-CCNC: 102 U/L (ref 55–135)
ALT SERPL W/O P-5'-P-CCNC: 33 U/L (ref 10–44)
ANION GAP SERPL CALC-SCNC: 9 MMOL/L (ref 8–16)
AST SERPL-CCNC: 25 U/L (ref 10–40)
BASOPHILS # BLD AUTO: 0.14 K/UL (ref 0–0.2)
BASOPHILS NFR BLD: 1.5 % (ref 0–1.9)
BILIRUB SERPL-MCNC: 0.3 MG/DL (ref 0.1–1)
BUN SERPL-MCNC: 15 MG/DL (ref 6–20)
CALCIUM SERPL-MCNC: 10.7 MG/DL (ref 8.7–10.5)
CHLORIDE SERPL-SCNC: 107 MMOL/L (ref 95–110)
CO2 SERPL-SCNC: 29 MMOL/L (ref 23–29)
CREAT SERPL-MCNC: 0.9 MG/DL (ref 0.5–1.4)
CRP SERPL-MCNC: 4.9 MG/L (ref 0–8.2)
DIFFERENTIAL METHOD: ABNORMAL
EOSINOPHIL # BLD AUTO: 0.3 K/UL (ref 0–0.5)
EOSINOPHIL NFR BLD: 3 % (ref 0–8)
ERYTHROCYTE [DISTWIDTH] IN BLOOD BY AUTOMATED COUNT: 14.1 % (ref 11.5–14.5)
ERYTHROCYTE [SEDIMENTATION RATE] IN BLOOD BY PHOTOMETRIC METHOD: 23 MM/HR (ref 0–36)
EST. GFR  (NO RACE VARIABLE): >60 ML/MIN/1.73 M^2
GLUCOSE SERPL-MCNC: 157 MG/DL (ref 70–110)
HCT VFR BLD AUTO: 47.1 % (ref 37–48.5)
HGB BLD-MCNC: 14.8 G/DL (ref 12–16)
IMM GRANULOCYTES # BLD AUTO: 0.02 K/UL (ref 0–0.04)
IMM GRANULOCYTES NFR BLD AUTO: 0.2 % (ref 0–0.5)
LYMPHOCYTES # BLD AUTO: 2.9 K/UL (ref 1–4.8)
LYMPHOCYTES NFR BLD: 30 % (ref 18–48)
MCH RBC QN AUTO: 29.4 PG (ref 27–31)
MCHC RBC AUTO-ENTMCNC: 31.4 G/DL (ref 32–36)
MCV RBC AUTO: 94 FL (ref 82–98)
MONOCYTES # BLD AUTO: 1 K/UL (ref 0.3–1)
MONOCYTES NFR BLD: 10.3 % (ref 4–15)
NEUTROPHILS # BLD AUTO: 5.3 K/UL (ref 1.8–7.7)
NEUTROPHILS NFR BLD: 55 % (ref 38–73)
NRBC BLD-RTO: 0 /100 WBC
PLATELET # BLD AUTO: 391 K/UL (ref 150–450)
PMV BLD AUTO: 11.5 FL (ref 9.2–12.9)
POTASSIUM SERPL-SCNC: 4.4 MMOL/L (ref 3.5–5.1)
PROT SERPL-MCNC: 7.5 G/DL (ref 6–8.4)
RBC # BLD AUTO: 5.03 M/UL (ref 4–5.4)
SODIUM SERPL-SCNC: 145 MMOL/L (ref 136–145)
WBC # BLD AUTO: 9.57 K/UL (ref 3.9–12.7)

## 2023-12-14 PROCEDURE — 85652 RBC SED RATE AUTOMATED: CPT | Performed by: INTERNAL MEDICINE

## 2023-12-14 PROCEDURE — 99215 OFFICE O/P EST HI 40 MIN: CPT | Mod: S$GLB,,, | Performed by: INTERNAL MEDICINE

## 2023-12-14 PROCEDURE — 3051F PR MOST RECENT HEMOGLOBIN A1C LEVEL 7.0 - < 8.0%: ICD-10-PCS | Mod: CPTII,S$GLB,, | Performed by: INTERNAL MEDICINE

## 2023-12-14 PROCEDURE — 3008F PR BODY MASS INDEX (BMI) DOCUMENTED: ICD-10-PCS | Mod: CPTII,S$GLB,, | Performed by: INTERNAL MEDICINE

## 2023-12-14 PROCEDURE — 3078F PR MOST RECENT DIASTOLIC BLOOD PRESSURE < 80 MM HG: ICD-10-PCS | Mod: CPTII,S$GLB,, | Performed by: INTERNAL MEDICINE

## 2023-12-14 PROCEDURE — 1160F PR REVIEW ALL MEDS BY PRESCRIBER/CLIN PHARMACIST DOCUMENTED: ICD-10-PCS | Mod: CPTII,S$GLB,, | Performed by: INTERNAL MEDICINE

## 2023-12-14 PROCEDURE — 85025 COMPLETE CBC W/AUTO DIFF WBC: CPT | Performed by: INTERNAL MEDICINE

## 2023-12-14 PROCEDURE — 3008F BODY MASS INDEX DOCD: CPT | Mod: CPTII,S$GLB,, | Performed by: INTERNAL MEDICINE

## 2023-12-14 PROCEDURE — 3074F SYST BP LT 130 MM HG: CPT | Mod: CPTII,S$GLB,, | Performed by: INTERNAL MEDICINE

## 2023-12-14 PROCEDURE — 3072F PR LOW RISK FOR RETINOPATHY: ICD-10-PCS | Mod: CPTII,S$GLB,, | Performed by: INTERNAL MEDICINE

## 2023-12-14 PROCEDURE — 1160F RVW MEDS BY RX/DR IN RCRD: CPT | Mod: CPTII,S$GLB,, | Performed by: INTERNAL MEDICINE

## 2023-12-14 PROCEDURE — 3074F PR MOST RECENT SYSTOLIC BLOOD PRESSURE < 130 MM HG: ICD-10-PCS | Mod: CPTII,S$GLB,, | Performed by: INTERNAL MEDICINE

## 2023-12-14 PROCEDURE — 99999 PR PBB SHADOW E&M-EST. PATIENT-LVL III: ICD-10-PCS | Mod: PBBFAC,,, | Performed by: INTERNAL MEDICINE

## 2023-12-14 PROCEDURE — 3078F DIAST BP <80 MM HG: CPT | Mod: CPTII,S$GLB,, | Performed by: INTERNAL MEDICINE

## 2023-12-14 PROCEDURE — 36415 COLL VENOUS BLD VENIPUNCTURE: CPT | Mod: PO | Performed by: INTERNAL MEDICINE

## 2023-12-14 PROCEDURE — 1159F MED LIST DOCD IN RCRD: CPT | Mod: CPTII,S$GLB,, | Performed by: INTERNAL MEDICINE

## 2023-12-14 PROCEDURE — 99215 PR OFFICE/OUTPT VISIT, EST, LEVL V, 40-54 MIN: ICD-10-PCS | Mod: S$GLB,,, | Performed by: INTERNAL MEDICINE

## 2023-12-14 PROCEDURE — 3072F LOW RISK FOR RETINOPATHY: CPT | Mod: CPTII,S$GLB,, | Performed by: INTERNAL MEDICINE

## 2023-12-14 PROCEDURE — 1159F PR MEDICATION LIST DOCUMENTED IN MEDICAL RECORD: ICD-10-PCS | Mod: CPTII,S$GLB,, | Performed by: INTERNAL MEDICINE

## 2023-12-14 PROCEDURE — 3051F HG A1C>EQUAL 7.0%<8.0%: CPT | Mod: CPTII,S$GLB,, | Performed by: INTERNAL MEDICINE

## 2023-12-14 PROCEDURE — 99999 PR PBB SHADOW E&M-EST. PATIENT-LVL III: CPT | Mod: PBBFAC,,, | Performed by: INTERNAL MEDICINE

## 2023-12-14 PROCEDURE — 86140 C-REACTIVE PROTEIN: CPT | Performed by: INTERNAL MEDICINE

## 2023-12-14 PROCEDURE — 80053 COMPREHEN METABOLIC PANEL: CPT | Performed by: INTERNAL MEDICINE

## 2023-12-14 RX ORDER — HYDROXYCHLOROQUINE SULFATE 200 MG/1
TABLET, FILM COATED ORAL
Qty: 60 TABLET | Refills: 6 | Status: SHIPPED | OUTPATIENT
Start: 2023-12-14

## 2023-12-14 RX ORDER — TRAMADOL HYDROCHLORIDE 50 MG/1
50 TABLET ORAL EVERY 12 HOURS PRN
Qty: 60 TABLET | Refills: 3 | Status: SHIPPED | OUTPATIENT
Start: 2023-12-14

## 2023-12-14 RX ORDER — LEFLUNOMIDE 20 MG/1
20 TABLET ORAL DAILY
Qty: 90 TABLET | Refills: 1 | Status: SHIPPED | OUTPATIENT
Start: 2023-12-14

## 2023-12-14 ASSESSMENT — ROUTINE ASSESSMENT OF PATIENT INDEX DATA (RAPID3)
PSYCHOLOGICAL DISTRESS SCORE: 1.1
PAIN SCORE: 4
TOTAL RAPID3 SCORE: 2.56
FATIGUE SCORE: 0
MDHAQ FUNCTION SCORE: 0.5
PATIENT GLOBAL ASSESSMENT SCORE: 2

## 2023-12-14 NOTE — PROGRESS NOTES
Subjective:          Chief Complaint: Domi Leary is a 58 y.o. female who had concerns including Disease Management.    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  Unfortunately by 2009 her arthritis flaring significantly  Seen last in 5/2021    Current:  LFA 20mg will not fill w/o labs but appears her PCP did   HCQ 200mg BID (overdue for eye exam)       Improved swelling  MCP 1st on right and   5th on left (injection 5/2019). And persistent swelling at the left 5th MCP but not painful.      Feet, knees hips. This visit ok.     AM stiffness: <30 min   Worst for her is at night sitting still  Some dry eye, no dry mouth  Previous medications    Enbrel: diffuse psoriasis after starting Enbrel. Resolved with holding.       Component      Latest Ref Rng & Units 1/31/2018 9/4/2009   PIA      Neg <1:160  Negative   CCP Antibodies      <5.0 U/mL  637.7   CRP      0.0 - 8.2 mg/L 12.8 (H) 32.2 (H)   Sed Rate      0 - 20 mm/Hr 12 23 (H)   Rheumatoid Factor      0 - 15 IU/ml  157 (H)     REVIEW OF SYSTEMS:    Review of Systems   Constitutional:  Negative for fever, malaise/fatigue and weight loss.   HENT:  Negative for sore throat.    Eyes:  Negative for double vision, photophobia and redness.   Respiratory:  Negative for cough, shortness of breath and wheezing.    Cardiovascular:  Negative for chest pain, palpitations and orthopnea.   Gastrointestinal:  Negative for abdominal pain, constipation and diarrhea.   Genitourinary:  Negative for dysuria, hematuria and urgency.   Musculoskeletal:  Positive for joint pain. Negative for back pain and myalgias.   Skin:  Negative for rash.   Neurological:  Negative for dizziness, tingling, focal weakness and headaches.    Endo/Heme/Allergies:  Does not bruise/bleed easily.   Psychiatric/Behavioral:  Negative for depression, hallucinations and suicidal ideas.                Objective:            Past Medical History:   Diagnosis Date    Diabetes 2012    Hypothyroid 2012    Rheumatoid arthritis 2012     Family History   Problem Relation Age of Onset    No Known Problems Mother     Ulcerative colitis Father     Cataracts Paternal Grandmother     Diabetes Maternal Aunt     Breast cancer Maternal Aunt     Glaucoma Neg Hx     Macular degeneration Neg Hx     Retinal detachment Neg Hx      Social History     Tobacco Use    Smoking status: Former     Current packs/day: 0.00     Types: Cigarettes     Quit date: 2009     Years since quittin.8    Smokeless tobacco: Never   Substance Use Topics    Alcohol use: Yes     Comment: occassionally    Drug use: No         Current Outpatient Medications on File Prior to Visit   Medication Sig Dispense Refill    empagliflozin-metformin (SYNJARDY XR) 12.5-1,000 mg TBph Take 1 tablet by mouth once daily. (( Due PCP visit and labs) 30 tablet 0    glimepiride (AMARYL) 4 MG tablet TAKE 1 TABLET BY MOUTH EVERY DAY WITH BREAKFAST 90 tablet 0    hydrOXYchloroQUINE (PLAQUENIL) 200 mg tablet TAKE 1 TABLET(200 MG) BY MOUTH TWICE DAILY 60 tablet 6    leflunomide (ARAVA) 20 MG Tab Take 1 tablet (20 mg total) by mouth once daily. MUST have labs regularly to continue this medication. 90 tablet 1    levothyroxine (SYNTHROID) 150 MCG tablet TAKE 1 TABLET BY MOUTH EVERY DAY 90 tablet 2    OZEMPIC 1 mg/dose (4 mg/3 mL) INJECT 1 MG UNDER THE SKIN ONCE WEEKLY 9 mL 0    pravastatin (PRAVACHOL) 10 MG tablet Take 1 tablet (10 mg total) by mouth once daily. 90 tablet 2    traMADoL (ULTRAM) 50 mg tablet Take 1 tablet (50 mg total) by mouth every 12 (twelve) hours as needed for Pain (rheumatoid arthritis). 120 tablet 3    ferrous sulfate (FEOSOL) 325 mg (65 mg iron) Tab tablet TAKE 1 TABLET BY MOUTH BEFORE  EVENING MEAL. (Patient not taking: Reported on 12/14/2023) 90 tablet 0     No current facility-administered medications on file prior to visit.       Vitals:    12/14/23 1036   BP: 121/79   Pulse: 90       Physical Exam:    Physical Exam  Constitutional:       Appearance: She is well-developed.   HENT:      Head: Normocephalic and atraumatic.   Eyes:      Pupils: Pupils are equal, round, and reactive to light.   Cardiovascular:      Rate and Rhythm: Normal rate and regular rhythm.      Heart sounds: Normal heart sounds.   Pulmonary:      Effort: Pulmonary effort is normal.      Breath sounds: Normal breath sounds.   Musculoskeletal:      Right shoulder: No swelling or tenderness. Normal range of motion.      Left shoulder: No swelling or tenderness. Normal range of motion.      Right elbow: No swelling. Normal range of motion. No tenderness.      Left elbow: No swelling. Normal range of motion. No tenderness.      Right wrist: No swelling or tenderness. Normal range of motion.      Left wrist: No swelling or tenderness. Normal range of motion.      Right hand: Swelling and tenderness present. Normal range of motion.      Left hand: Swelling and tenderness present. Normal range of motion.      Cervical back: Normal range of motion.      Right knee: No swelling. Normal range of motion. No tenderness.      Left knee: No swelling. Normal range of motion. No tenderness.      Right foot: Normal range of motion. No swelling or tenderness.      Left foot: Normal range of motion. No swelling or tenderness.      Comments: Right 1MCP no synovitis   Left 5th MCP synovitis interdigital space. Mild TTP.   Early swan neck deformity.     + finkelstein's right wrist.      Skin:     General: Skin is warm and dry.   Neurological:      Mental Status: She is alert and oriented to person, place, and time.   Psychiatric:         Behavior: Behavior normal.       Component      Latest Ref Rng & Units 8/8/2018   Sed Rate      0 - 20 mm/Hr 15    CRP      0.0 - 8.2 mg/L 10.0 (H)   Rheumatoid Factor      0.0 - 15.0 IU/mL 85.0 (H)   CCP Antibodies      <5.0 U/mL 646.5 (H)             Assessment:       Encounter Diagnoses   Name Primary?    Seropositive rheumatoid arthritis of multiple sites Yes    Long-term use of immunosuppressant medication     Immunosuppression     High risk medications (not anticoagulants) long-term use             Plan:        Seropositive rheumatoid arthritis of multiple sites  -     CBC Auto Differential; Standing; Expected date: 12/14/2023  -     Comprehensive Metabolic Panel; Standing; Expected date: 12/14/2023  -     C-Reactive Protein; Standing; Expected date: 12/14/2023  -     Sedimentation rate; Standing; Expected date: 12/14/2023  -     hydroxychloroquine (PLAQUENIL) 200 mg tablet; TAKE 1 TABLET(200 MG) BY MOUTH TWICE DAILY  Dispense: 60 tablet; Refill: 6  -     leflunomide (ARAVA) 20 MG Tab; Take 1 tablet (20 mg total) by mouth once daily. MUST have labs regularly to continue this medication.  Dispense: 90 tablet; Refill: 1  -     traMADoL (ULTRAM) 50 mg tablet; Take 1 tablet (50 mg total) by mouth every 12 (twelve) hours as needed for Pain (rheumatoid arthritis).  Dispense: 60 tablet; Refill: 3    Long-term use of immunosuppressant medication  -     leflunomide (ARAVA) 20 MG Tab; Take 1 tablet (20 mg total) by mouth once daily. MUST have labs regularly to continue this medication.  Dispense: 90 tablet; Refill: 1    Immunosuppression  -     CBC Auto Differential; Standing; Expected date: 12/14/2023  -     Comprehensive Metabolic Panel; Standing; Expected date: 12/14/2023  -     C-Reactive Protein; Standing; Expected date: 12/14/2023  -     Sedimentation rate; Standing; Expected date: 12/14/2023    High risk medications (not anticoagulants) long-term use  -     leflunomide (ARAVA) 20 MG Tab; Take 1 tablet (20 mg total) by mouth once daily. MUST have labs regularly to continue this medication.  Dispense: 90 tablet; Refill:  1      Very pleasant 59 y/o female with hx of sero+ RA and felty's s/p splenectomy   LOV 2019, RTC today only missed approximately 3 months of therapy over 2020 and doing well.    -continue LFA 20mg daily (must have regular labs and f/u  with this medication!)    -add HCQ 200mg BID (must have annual eye exam)   -cotninue Diclofenac     Discussed with patient possibility for biologic therapy but having good response and tolerance with HCQ      Patient would like to avoid biologics if able.   Follow up in about 5 months (around 5/14/2024).   F/u 4 months      40min consultation with greater than 50% of that time included Preparing to see the patient (review records, tests), Obtaining and/or reviewing separately obtained historical data, Performing a medically appropriate examination and/or evaluation , Ordering medications, tests, and/or procedures, Referring and communicating with other healthcare professionals , Documenting clinical information in the electronic or other health record and Independently interpreting results  (as warranted) & communicating results to the patient/family/caregiver. All questions answered.

## 2024-01-24 DIAGNOSIS — E11.40 TYPE 2 DIABETES MELLITUS WITH DIABETIC NEUROPATHY, WITHOUT LONG-TERM CURRENT USE OF INSULIN: ICD-10-CM

## 2024-01-24 NOTE — TELEPHONE ENCOUNTER
No care due was identified.  [facility] Embedded Care Due Messages. Reference number: [id number].   1/24/2024 10:43:11 AM CST

## 2024-01-25 RX ORDER — SEMAGLUTIDE 1.34 MG/ML
INJECTION, SOLUTION SUBCUTANEOUS
Qty: 9 ML | Refills: 0 | OUTPATIENT
Start: 2024-01-25

## 2024-01-25 NOTE — TELEPHONE ENCOUNTER
Refill Routing Note   Medication(s) are not appropriate for processing by [facility] for the following reason(s):        Required labs outdated    [facility] action(s):  Defer               Appointments  past 12m or future 3m with PCP    Date Provider   Last Visit   5/8/2023 Rosales Boles MD   Next Visit   Visit date not found Rosales Boles MD   ED visits in past 90 days: 0        Note composed:9:04 PM 01/24/2024

## 2024-02-01 ENCOUNTER — TELEPHONE (OUTPATIENT)
Dept: FAMILY MEDICINE | Facility: CLINIC | Age: 59
End: 2024-02-01
Payer: COMMERCIAL

## 2024-02-01 NOTE — TELEPHONE ENCOUNTER
----- Message from Marifer Hilario sent at 2/1/2024  1:26 PM CST -----  Contact: Mary/[phone]  Type: Needs Medical Advice  Who Called:  pt  Best Call Back Number: [phone]  Additional Information: Pt is requesting a call back to discuss which labs are necessary to schedule before appt on 2/6. Please call to schedule labs. Thank you!

## 2024-02-06 ENCOUNTER — LAB VISIT (OUTPATIENT)
Dept: LAB | Facility: HOSPITAL | Age: 59
End: 2024-02-06
Attending: INTERNAL MEDICINE
Payer: COMMERCIAL

## 2024-02-06 ENCOUNTER — OFFICE VISIT (OUTPATIENT)
Dept: FAMILY MEDICINE | Facility: CLINIC | Age: 59
End: 2024-02-06
Payer: COMMERCIAL

## 2024-02-06 VITALS
DIASTOLIC BLOOD PRESSURE: 74 MMHG | BODY MASS INDEX: 32.19 KG/M2 | HEART RATE: 96 BPM | HEIGHT: 63 IN | SYSTOLIC BLOOD PRESSURE: 126 MMHG | WEIGHT: 181.69 LBS | OXYGEN SATURATION: 96 %

## 2024-02-06 DIAGNOSIS — E03.4 HYPOTHYROIDISM DUE TO ACQUIRED ATROPHY OF THYROID: ICD-10-CM

## 2024-02-06 DIAGNOSIS — E61.1 IRON DEFICIENCY: ICD-10-CM

## 2024-02-06 DIAGNOSIS — E78.2 DM TYPE 2 WITH DIABETIC MIXED HYPERLIPIDEMIA: ICD-10-CM

## 2024-02-06 DIAGNOSIS — Z00.00 WELLNESS EXAMINATION: ICD-10-CM

## 2024-02-06 DIAGNOSIS — M05.79 SEROPOSITIVE RHEUMATOID ARTHRITIS OF MULTIPLE SITES: ICD-10-CM

## 2024-02-06 DIAGNOSIS — E78.2 MIXED HYPERLIPIDEMIA: ICD-10-CM

## 2024-02-06 DIAGNOSIS — E11.65 TYPE 2 DIABETES MELLITUS WITH HYPERGLYCEMIA, WITHOUT LONG-TERM CURRENT USE OF INSULIN: ICD-10-CM

## 2024-02-06 DIAGNOSIS — Z00.00 WELLNESS EXAMINATION: Primary | ICD-10-CM

## 2024-02-06 DIAGNOSIS — D84.9 IMMUNOSUPPRESSION: ICD-10-CM

## 2024-02-06 DIAGNOSIS — E11.69 DM TYPE 2 WITH DIABETIC MIXED HYPERLIPIDEMIA: ICD-10-CM

## 2024-02-06 DIAGNOSIS — E11.40 TYPE 2 DIABETES MELLITUS WITH DIABETIC NEUROPATHY, WITHOUT LONG-TERM CURRENT USE OF INSULIN: ICD-10-CM

## 2024-02-06 DIAGNOSIS — E66.9 OBESITY (BMI 30-39.9): ICD-10-CM

## 2024-02-06 LAB
ALBUMIN/CREAT UR: NORMAL UG/MG (ref 0–30)
CREAT UR-MCNC: 16 MG/DL (ref 15–325)
MICROALBUMIN UR DL<=1MG/L-MCNC: <5 UG/ML

## 2024-02-06 PROCEDURE — 3051F HG A1C>EQUAL 7.0%<8.0%: CPT | Mod: CPTII,S$GLB,, | Performed by: INTERNAL MEDICINE

## 2024-02-06 PROCEDURE — 99396 PREV VISIT EST AGE 40-64: CPT | Mod: S$GLB,,, | Performed by: INTERNAL MEDICINE

## 2024-02-06 PROCEDURE — 1159F MED LIST DOCD IN RCRD: CPT | Mod: CPTII,S$GLB,, | Performed by: INTERNAL MEDICINE

## 2024-02-06 PROCEDURE — 3074F SYST BP LT 130 MM HG: CPT | Mod: CPTII,S$GLB,, | Performed by: INTERNAL MEDICINE

## 2024-02-06 PROCEDURE — 3078F DIAST BP <80 MM HG: CPT | Mod: CPTII,S$GLB,, | Performed by: INTERNAL MEDICINE

## 2024-02-06 PROCEDURE — 3061F NEG MICROALBUMINURIA REV: CPT | Mod: CPTII,S$GLB,, | Performed by: INTERNAL MEDICINE

## 2024-02-06 PROCEDURE — 99999 PR PBB SHADOW E&M-EST. PATIENT-LVL III: CPT | Mod: PBBFAC,,, | Performed by: INTERNAL MEDICINE

## 2024-02-06 PROCEDURE — 1160F RVW MEDS BY RX/DR IN RCRD: CPT | Mod: CPTII,S$GLB,, | Performed by: INTERNAL MEDICINE

## 2024-02-06 PROCEDURE — 82043 UR ALBUMIN QUANTITATIVE: CPT | Performed by: INTERNAL MEDICINE

## 2024-02-06 PROCEDURE — 3066F NEPHROPATHY DOC TX: CPT | Mod: CPTII,S$GLB,, | Performed by: INTERNAL MEDICINE

## 2024-02-06 PROCEDURE — 3008F BODY MASS INDEX DOCD: CPT | Mod: CPTII,S$GLB,, | Performed by: INTERNAL MEDICINE

## 2024-02-06 NOTE — PROGRESS NOTES
Subjective:       Patient ID: Domi Leary is a 58 y.o. female.    Chief Complaint: Follow-up (Med refill ) and Annual Exam   HPI  Took iron for few months and not on anymore     Gyn: 2019 Dr Sanchez menopause   MM/21 recommend   Dexa: never / rx   Colonoscopy:  O 3/23 r/c 5 yr   Immunizations: Flu: D Tdap:   due next visit Pneumovax:   Prevnar 13:  Shingles:  Recommend Covid:    Smoker:  Former  Eye: O   Prev PCP Dr Nicole  Reduced compliance w labs/visit /  discussed needs labs and visit Q 6 M      Diabetes type 2:  improved A1c 7.8, 7.4  /   Rx Ozempic 1 mg, Glimepiride 4 q.d., Synjardy 1 q.d.  Prev A1c 9.0 // .  (SE: Metformin ER 1000 diarrhea. )     Hypothyroid: uncontrolled /? 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    Prev mgmt Heme  Dr Calle      Metabolic syndrome BMI 33 & 187, 31 & 176, 32 & 181.      ____________________________________________________________________________________________________  Assessment & Plan:  1. Wellness examination  - Microalbumin/Creatinine Ratio, Urine; Future  - TSH; Future  - Lipid Panel; Future  - Hemoglobin A1C; Future  - Basic Metabolic Panel; Future  - Iron and TIBC; Future  - TSH; Future  - Hemoglobin A1C; Future  - Lipid Panel; Future    2. Iron deficiency  - Iron and TIBC; Future    3. Type 2 diabetes mellitus with hyperglycemia, without long-term current use of insulin  - Microalbumin/Creatinine Ratio, Urine; Future  - Hemoglobin A1C; Future  - Basic Metabolic Panel; Future    4. Hypothyroidism due to acquired  atrophy of thyroid  - TSH; Future    5. Mixed hyperlipidemia  - Lipid Panel; Future    6. DM type 2 with diabetic mixed hyperlipidemia  - TSH; Future  - Hemoglobin A1C; Future  - Lipid Panel; Future    7. Obesity (BMI 30-39.9)    8. Seropositive rheumatoid arthritis of multiple sites    9. Immunosuppression     Wellness examination  -     Microalbumin/Creatinine Ratio, Urine; Future; Expected date: 02/06/2024  -     TSH; Future; Expected date: 02/06/2024  -     Lipid Panel; Future; Expected date: 02/06/2024  -     Hemoglobin A1C; Future; Expected date: 02/06/2024  -     Basic Metabolic Panel; Future; Expected date: 02/06/2024  -     Iron and TIBC; Future; Expected date: 02/06/2024  -     TSH; Future; Expected date: 02/08/2024  -     Hemoglobin A1C; Future; Expected date: 02/08/2024  -     Lipid Panel; Future; Expected date: 02/08/2024    Iron deficiency  -     Iron and TIBC; Future; Expected date: 02/06/2024    Type 2 diabetes mellitus with hyperglycemia, without long-term current use of insulin  -     Microalbumin/Creatinine Ratio, Urine; Future; Expected date: 02/06/2024  -     Hemoglobin A1C; Future; Expected date: 02/06/2024  -     Basic Metabolic Panel; Future; Expected date: 02/06/2024    Hypothyroidism due to acquired atrophy of thyroid  -     TSH; Future; Expected date: 02/06/2024    Mixed hyperlipidemia  -     Lipid Panel; Future; Expected date: 02/06/2024    DM type 2 with diabetic mixed hyperlipidemia  -     TSH; Future; Expected date: 02/08/2024  -     Hemoglobin A1C; Future; Expected date: 02/08/2024  -     Lipid Panel; Future; Expected date: 02/08/2024    Obesity (BMI 30-39.9)    Seropositive rheumatoid arthritis of multiple sites    Immunosuppression        Continue to work on regular exercise, maintain healthy weight, balanced diet. Avoid unhealthy habits: smoking, excessive alcohol intake.     Disclaimer: This note was partly generated using dictation software which may occasionally result in  transcription errors  ____________________________________________________________________________________________________  Review of Systems:  Review of Systems   Constitutional:  Negative for appetite change.   HENT:  Negative for nosebleeds.    Eyes:  Negative for pain.   Respiratory:  Negative for choking.    Cardiovascular:  Negative for chest pain.   Gastrointestinal:  Negative for blood in stool.   Genitourinary:  Negative for hematuria.   Musculoskeletal:  Negative for joint swelling.   Skin:  Negative for pallor.   Neurological:  Negative for facial asymmetry.   Hematological:  Does not bruise/bleed easily.   Psychiatric/Behavioral:  Negative for confusion.        Objective:     Wt Readings from Last 3 Encounters:   02/06/24 82.4 kg (181 lb 10.5 oz)   12/14/23 82.3 kg (181 lb 7 oz)   06/14/23 83 kg (182 lb 14 oz)     BP Readings from Last 3 Encounters:   02/06/24 126/74   12/14/23 121/79   06/14/23 118/74       Lab Results   Component Value Date    WBC 9.57 12/14/2023    HGB 14.8 12/14/2023    HCT 47.1 12/14/2023     12/14/2023     02/06/2024    K 3.8 02/06/2024     02/06/2024    ALT 33 12/14/2023    AST 25 12/14/2023    CO2 24 02/06/2024    CREATININE 0.9 02/06/2024    BUN 15 02/06/2024     (H) 02/06/2024      Hemoglobin A1C   Date Value Ref Range Status   02/06/2024 7.4 (H) 4.0 - 5.6 % Final     Comment:     ADA Screening Guidelines:  5.7-6.4%  Consistent with prediabetes  >or=6.5%  Consistent with diabetes    High levels of fetal hemoglobin interfere with the HbA1C  assay. Heterozygous hemoglobin variants (HbS, HgC, etc)do  not significantly interfere with this assay.   However, presence of multiple variants may affect accuracy.     05/08/2023 7.8 (H) 4.0 - 5.6 % Final     Comment:     ADA Screening Guidelines:  5.7-6.4%  Consistent with prediabetes  >or=6.5%  Consistent with diabetes    High levels of fetal hemoglobin interfere with the HbA1C  assay. Heterozygous hemoglobin  variants (HbS, HgC, etc)do  not significantly interfere with this assay.   However, presence of multiple variants may affect accuracy.     10/29/2022 7.7 (H) 4.0 - 5.6 % Final     Comment:     ADA Screening Guidelines:  5.7-6.4%  Consistent with prediabetes  >or=6.5%  Consistent with diabetes    High levels of fetal hemoglobin interfere with the HbA1C  assay. Heterozygous hemoglobin variants (HbS, HgC, etc)do  not significantly interfere with this assay.   However, presence of multiple variants may affect accuracy.        Lab Results   Component Value Date    TSH 7.093 (H) 02/06/2024    TSH 2.732 05/08/2023    TSH 0.143 (L) 10/29/2022     Lab Results   Component Value Date    FREET4 1.02 02/06/2024    FREET4 1.00 05/08/2023    FREET4 1.13 10/29/2022     Lab Results   Component Value Date    LDLCALC 56.4 (L) 02/06/2024    LDLCALC 45.0 (L) 10/29/2022    LDLCALC 50.2 (L) 08/11/2021     Lab Results   Component Value Date    TRIG 178 (H) 02/06/2024    TRIG 80 10/29/2022    TRIG 144 08/11/2021            Physical Exam  Constitutional:       Appearance: Normal appearance.   HENT:      Head: Normocephalic and atraumatic.   Eyes:      Extraocular Movements: Extraocular movements intact.      Pupils: Pupils are equal, round, and reactive to light.   Cardiovascular:      Rate and Rhythm: Normal rate.   Pulmonary:      Effort: Pulmonary effort is normal.   Neurological:      Mental Status: She is alert and oriented to person, place, and time.   Psychiatric:         Mood and Affect: Mood normal.         Medication List with Changes/Refills   Current Medications    EMPAGLIFLOZIN-METFORMIN (SYNJARDY XR) 12.5-1,000 MG TBPH    Take 1 tablet by mouth once daily. (( Due PCP visit and labs)    FERROUS SULFATE (FEOSOL) 325 MG (65 MG IRON) TAB TABLET    TAKE 1 TABLET BY MOUTH BEFORE EVENING MEAL.    GLIMEPIRIDE (AMARYL) 4 MG TABLET    TAKE 1 TABLET BY MOUTH EVERY DAY WITH BREAKFAST    HYDROXYCHLOROQUINE (PLAQUENIL) 200 MG TABLET    TAKE  1 TABLET(200 MG) BY MOUTH TWICE DAILY    LEFLUNOMIDE (ARAVA) 20 MG TAB    Take 1 tablet (20 mg total) by mouth once daily. MUST have labs regularly to continue this medication.    LEVOTHYROXINE (SYNTHROID) 150 MCG TABLET    TAKE 1 TABLET BY MOUTH EVERY DAY    OZEMPIC 1 MG/DOSE (4 MG/3 ML)    INJECT 1 MG UNDER THE SKIN ONCE WEEKLY    PRAVASTATIN (PRAVACHOL) 10 MG TABLET    Take 1 tablet (10 mg total) by mouth once daily.    TRAMADOL (ULTRAM) 50 MG TABLET    Take 1 tablet (50 mg total) by mouth every 12 (twelve) hours as needed for Pain (rheumatoid arthritis).

## 2024-02-07 NOTE — TELEPHONE ENCOUNTER
Provider Staff:  Please note Refusal of medication.     Action required for this patient.      Requested Prescriptions     Refused Prescriptions Disp Refills    OZEMPIC 1 mg/dose (4 mg/3 mL) [Pharmacy Med Name: OZEMPIC 4 MG/3 ML (1 MG/DOSE)] 9 mL 0     Sig: INJECT 1 MG UNDER THE SKIN ONE TIME PER WEEK     Refused By: JERICA ELKINS     Reason for Refusal: Patient needs an appointment      Thanks!  [facility]   Note composed: 02/07/2024 8:24 AM

## 2024-02-08 PROBLEM — E78.2 DM TYPE 2 WITH DIABETIC MIXED HYPERLIPIDEMIA: Status: ACTIVE | Noted: 2024-02-08

## 2024-02-08 PROBLEM — E11.69 DM TYPE 2 WITH DIABETIC MIXED HYPERLIPIDEMIA: Status: ACTIVE | Noted: 2024-02-08

## 2024-02-08 RX ORDER — PRAVASTATIN SODIUM 10 MG/1
10 TABLET ORAL DAILY
Qty: 90 TABLET | Refills: 1 | Status: SHIPPED | OUTPATIENT
Start: 2024-02-08

## 2024-02-08 RX ORDER — SEMAGLUTIDE 1.34 MG/ML
INJECTION, SOLUTION SUBCUTANEOUS
Qty: 9 ML | Refills: 1 | Status: SHIPPED | OUTPATIENT
Start: 2024-02-08

## 2024-02-08 RX ORDER — GLIMEPIRIDE 4 MG/1
4 TABLET ORAL
Qty: 90 TABLET | Refills: 1 | Status: SHIPPED | OUTPATIENT
Start: 2024-02-08

## 2024-02-08 RX ORDER — EMPAGLIFLOZIN, METFORMIN HYDROCHLORIDE 12.5; 1 MG/1; MG/1
1 TABLET, EXTENDED RELEASE ORAL DAILY
Qty: 90 TABLET | Refills: 1 | Status: SHIPPED | OUTPATIENT
Start: 2024-02-08

## 2024-02-08 RX ORDER — LEVOTHYROXINE SODIUM 175 UG/1
175 TABLET ORAL
Qty: 90 TABLET | Refills: 1 | Status: SHIPPED | OUTPATIENT
Start: 2024-02-08

## 2024-04-11 ENCOUNTER — PATIENT MESSAGE (OUTPATIENT)
Dept: ADMINISTRATIVE | Facility: HOSPITAL | Age: 59
End: 2024-04-11
Payer: COMMERCIAL

## 2024-05-13 ENCOUNTER — LAB VISIT (OUTPATIENT)
Dept: LAB | Facility: HOSPITAL | Age: 59
End: 2024-05-13
Attending: INTERNAL MEDICINE
Payer: COMMERCIAL

## 2024-05-13 ENCOUNTER — TELEPHONE (OUTPATIENT)
Dept: FAMILY MEDICINE | Facility: CLINIC | Age: 59
End: 2024-05-13
Payer: COMMERCIAL

## 2024-05-13 DIAGNOSIS — D84.9 IMMUNOSUPPRESSION: ICD-10-CM

## 2024-05-13 DIAGNOSIS — M05.79 SEROPOSITIVE RHEUMATOID ARTHRITIS OF MULTIPLE SITES: ICD-10-CM

## 2024-05-13 LAB
ALBUMIN SERPL BCP-MCNC: 3.3 G/DL (ref 3.5–5.2)
ALP SERPL-CCNC: 90 U/L (ref 55–135)
ALT SERPL W/O P-5'-P-CCNC: 25 U/L (ref 10–44)
ANION GAP SERPL CALC-SCNC: 7 MMOL/L (ref 8–16)
AST SERPL-CCNC: 19 U/L (ref 10–40)
BASOPHILS # BLD AUTO: 0.16 K/UL (ref 0–0.2)
BASOPHILS NFR BLD: 1.6 % (ref 0–1.9)
BILIRUB SERPL-MCNC: 0.3 MG/DL (ref 0.1–1)
BUN SERPL-MCNC: 13 MG/DL (ref 6–20)
CALCIUM SERPL-MCNC: 10 MG/DL (ref 8.7–10.5)
CHLORIDE SERPL-SCNC: 106 MMOL/L (ref 95–110)
CO2 SERPL-SCNC: 28 MMOL/L (ref 23–29)
CREAT SERPL-MCNC: 0.8 MG/DL (ref 0.5–1.4)
CRP SERPL-MCNC: 5.2 MG/L (ref 0–8.2)
DIFFERENTIAL METHOD BLD: ABNORMAL
EOSINOPHIL # BLD AUTO: 0.6 K/UL (ref 0–0.5)
EOSINOPHIL NFR BLD: 6.1 % (ref 0–8)
ERYTHROCYTE [DISTWIDTH] IN BLOOD BY AUTOMATED COUNT: 13.6 % (ref 11.5–14.5)
ERYTHROCYTE [SEDIMENTATION RATE] IN BLOOD BY PHOTOMETRIC METHOD: 27 MM/HR (ref 0–36)
EST. GFR  (NO RACE VARIABLE): >60 ML/MIN/1.73 M^2
GLUCOSE SERPL-MCNC: 207 MG/DL (ref 70–110)
HCT VFR BLD AUTO: 47.1 % (ref 37–48.5)
HGB BLD-MCNC: 14.4 G/DL (ref 12–16)
IMM GRANULOCYTES # BLD AUTO: 0.02 K/UL (ref 0–0.04)
IMM GRANULOCYTES NFR BLD AUTO: 0.2 % (ref 0–0.5)
LYMPHOCYTES # BLD AUTO: 4.1 K/UL (ref 1–4.8)
LYMPHOCYTES NFR BLD: 41.9 % (ref 18–48)
MCH RBC QN AUTO: 29.3 PG (ref 27–31)
MCHC RBC AUTO-ENTMCNC: 30.6 G/DL (ref 32–36)
MCV RBC AUTO: 96 FL (ref 82–98)
MONOCYTES # BLD AUTO: 1.1 K/UL (ref 0.3–1)
MONOCYTES NFR BLD: 10.6 % (ref 4–15)
NEUTROPHILS # BLD AUTO: 3.9 K/UL (ref 1.8–7.7)
NEUTROPHILS NFR BLD: 39.6 % (ref 38–73)
NRBC BLD-RTO: 0 /100 WBC
PLATELET # BLD AUTO: 359 K/UL (ref 150–450)
PMV BLD AUTO: 11.7 FL (ref 9.2–12.9)
POTASSIUM SERPL-SCNC: 4.2 MMOL/L (ref 3.5–5.1)
PROT SERPL-MCNC: 7.2 G/DL (ref 6–8.4)
RBC # BLD AUTO: 4.91 M/UL (ref 4–5.4)
SODIUM SERPL-SCNC: 141 MMOL/L (ref 136–145)
WBC # BLD AUTO: 9.86 K/UL (ref 3.9–12.7)

## 2024-05-13 PROCEDURE — 86140 C-REACTIVE PROTEIN: CPT | Performed by: INTERNAL MEDICINE

## 2024-05-13 PROCEDURE — 85025 COMPLETE CBC W/AUTO DIFF WBC: CPT | Performed by: INTERNAL MEDICINE

## 2024-05-13 PROCEDURE — 80053 COMPREHEN METABOLIC PANEL: CPT | Performed by: INTERNAL MEDICINE

## 2024-05-13 PROCEDURE — 36415 COLL VENOUS BLD VENIPUNCTURE: CPT | Mod: PN | Performed by: INTERNAL MEDICINE

## 2024-05-13 PROCEDURE — 85652 RBC SED RATE AUTOMATED: CPT | Performed by: INTERNAL MEDICINE

## 2024-05-13 NOTE — TELEPHONE ENCOUNTER
PA for Ozempic 1mg/dose initiated via [employer]    KEY: [id number]  DX: Type II DM     Awaiting determination response:    Your demographic data has been sent to [facility] successfully!  [facility] typically takes 5-10 minutes to respond, but it may take a little longer in some cases. You will be notified by email when available. You can also check for an update later by opening this request from your dashboard. Please do not fax or call [facility] to resubmit this request. If you need assistance, please chat with [employer] or call us at [phone].  If it has been longer than 24 hours, please reach out to [facility].

## 2024-05-16 ENCOUNTER — OFFICE VISIT (OUTPATIENT)
Dept: RHEUMATOLOGY | Facility: CLINIC | Age: 59
End: 2024-05-16
Payer: COMMERCIAL

## 2024-05-16 VITALS
WEIGHT: 184.63 LBS | HEART RATE: 87 BPM | BODY MASS INDEX: 32.71 KG/M2 | DIASTOLIC BLOOD PRESSURE: 83 MMHG | HEIGHT: 63 IN | SYSTOLIC BLOOD PRESSURE: 131 MMHG

## 2024-05-16 DIAGNOSIS — D84.9 IMMUNOSUPPRESSION: ICD-10-CM

## 2024-05-16 DIAGNOSIS — Z79.60 LONG-TERM USE OF IMMUNOSUPPRESSANT MEDICATION: ICD-10-CM

## 2024-05-16 DIAGNOSIS — E11.69 DM TYPE 2 WITH DIABETIC MIXED HYPERLIPIDEMIA: ICD-10-CM

## 2024-05-16 DIAGNOSIS — Z79.899 LONG-TERM USE OF PLAQUENIL: ICD-10-CM

## 2024-05-16 DIAGNOSIS — E78.2 DM TYPE 2 WITH DIABETIC MIXED HYPERLIPIDEMIA: ICD-10-CM

## 2024-05-16 DIAGNOSIS — M05.79 SEROPOSITIVE RHEUMATOID ARTHRITIS OF MULTIPLE SITES: Primary | ICD-10-CM

## 2024-05-16 PROCEDURE — 3061F NEG MICROALBUMINURIA REV: CPT | Mod: CPTII,S$GLB,, | Performed by: INTERNAL MEDICINE

## 2024-05-16 PROCEDURE — 99999 PR PBB SHADOW E&M-EST. PATIENT-LVL III: CPT | Mod: PBBFAC,,, | Performed by: INTERNAL MEDICINE

## 2024-05-16 PROCEDURE — 1159F MED LIST DOCD IN RCRD: CPT | Mod: CPTII,S$GLB,, | Performed by: INTERNAL MEDICINE

## 2024-05-16 PROCEDURE — 3066F NEPHROPATHY DOC TX: CPT | Mod: CPTII,S$GLB,, | Performed by: INTERNAL MEDICINE

## 2024-05-16 PROCEDURE — 99215 OFFICE O/P EST HI 40 MIN: CPT | Mod: S$GLB,,, | Performed by: INTERNAL MEDICINE

## 2024-05-16 PROCEDURE — 3079F DIAST BP 80-89 MM HG: CPT | Mod: CPTII,S$GLB,, | Performed by: INTERNAL MEDICINE

## 2024-05-16 PROCEDURE — 3075F SYST BP GE 130 - 139MM HG: CPT | Mod: CPTII,S$GLB,, | Performed by: INTERNAL MEDICINE

## 2024-05-16 PROCEDURE — 3008F BODY MASS INDEX DOCD: CPT | Mod: CPTII,S$GLB,, | Performed by: INTERNAL MEDICINE

## 2024-05-16 PROCEDURE — 3051F HG A1C>EQUAL 7.0%<8.0%: CPT | Mod: CPTII,S$GLB,, | Performed by: INTERNAL MEDICINE

## 2024-05-16 ASSESSMENT — ROUTINE ASSESSMENT OF PATIENT INDEX DATA (RAPID3)
PATIENT GLOBAL ASSESSMENT SCORE: 2
FATIGUE SCORE: 0
TOTAL RAPID3 SCORE: 2.11
PAIN SCORE: 3
MDHAQ FUNCTION SCORE: 0.4
PSYCHOLOGICAL DISTRESS SCORE: 1.1

## 2024-05-16 NOTE — PROGRESS NOTES
Subjective:          Chief Complaint: Domi Leary is a 59 y.o. female who had concerns including Disease Management.    HPI:    Patient is a 59-year-old female she has a history of rheumatoid arthritis dating back to 2003 she has had a history of possible of Felty syndrome with neutropenia recurrent infections and splenomegaly did undergo splenectomy which improved her neutropenia and no longer having serious infections.  Unfortunately by 2009 her arthritis flaring significantly    Doing well overall still with stiffness and loss of ROM at the right 2nd MCP but continues to be active.   Psoriasis on hand is back, only other episode with Enbrel in past. Using OTC hydrocortisone.     Current:  LFA 20mg will not fill w/o labs but appears her PCP did   HCQ 200mg BID (overdue for eye exam)     Feet, knees hips. This visit ok.     AM stiffness: <30 min   Worst for her is at night sitting still  Some dry eye, no dry mouth  Previous medications    Enbrel: diffuse psoriasis after starting Enbrel. Resolved with holding.       Component      Latest Ref Rng & Units 1/31/2018 9/4/2009   PIA      Neg <1:160  Negative   CCP Antibodies      <5.0 U/mL  637.7   CRP      0.0 - 8.2 mg/L 12.8 (H) 32.2 (H)   Sed Rate      0 - 20 mm/Hr 12 23 (H)   Rheumatoid Factor      0 - 15 IU/ml  157 (H)     REVIEW OF SYSTEMS:    Review of Systems   Constitutional:  Negative for fever, malaise/fatigue and weight loss.   HENT:  Negative for sore throat.    Eyes:  Negative for double vision, photophobia and redness.   Respiratory:  Negative for cough, shortness of breath and wheezing.    Cardiovascular:  Negative for chest pain, palpitations and orthopnea.   Gastrointestinal:  Negative for abdominal pain, constipation and diarrhea.   Genitourinary:  Negative for dysuria, hematuria and urgency.   Musculoskeletal:  Positive for joint pain. Negative for back pain and myalgias.   Skin:  Negative for rash.   Neurological:  Negative for dizziness,  tingling, focal weakness and headaches.   Endo/Heme/Allergies:  Does not bruise/bleed easily.   Psychiatric/Behavioral:  Negative for depression, hallucinations and suicidal ideas.                Objective:            Past Medical History:   Diagnosis Date    Diabetes 2/20/2012    Hypothyroid 2/20/2012    Rheumatoid arthritis 2/20/2012     Family History   Problem Relation Name Age of Onset    No Known Problems Mother      Ulcerative colitis Father      Cataracts Paternal Grandmother      Diabetes Maternal Aunt      Breast cancer Maternal Aunt      Glaucoma Neg Hx      Macular degeneration Neg Hx      Retinal detachment Neg Hx       Social History     Tobacco Use    Smoking status: Former     Current packs/day: 0.00     Types: Cigarettes     Quit date: 2/20/2009     Years since quitting: 15.2    Smokeless tobacco: Never   Substance Use Topics    Alcohol use: Yes     Comment: occassionally    Drug use: No         Current Outpatient Medications on File Prior to Visit   Medication Sig Dispense Refill    empagliflozin-metformin (SYNJARDY XR) 12.5-1,000 mg TBph Take 1 tablet by mouth once daily. 90 tablet 1    glimepiride (AMARYL) 4 MG tablet Take 1 tablet (4 mg total) by mouth daily with breakfast. 90 tablet 1    hydroxychloroquine (PLAQUENIL) 200 mg tablet TAKE 1 TABLET(200 MG) BY MOUTH TWICE DAILY 60 tablet 6    leflunomide (ARAVA) 20 MG Tab Take 1 tablet (20 mg total) by mouth once daily. MUST have labs regularly to continue this medication. 90 tablet 1    levothyroxine (SYNTHROID, LEVOTHROID) 175 MCG tablet Take 1 tablet (175 mcg total) by mouth before breakfast. 90 tablet 1    pravastatin (PRAVACHOL) 10 MG tablet Take 1 tablet (10 mg total) by mouth once daily. 90 tablet 1    semaglutide (OZEMPIC) 1 mg/dose (4 mg/3 mL) INJECT 1 MG UNDER THE SKIN ONCE WEEKLY 9 mL 1    traMADoL (ULTRAM) 50 mg tablet Take 1 tablet (50 mg total) by mouth every 12 (twelve) hours as needed for Pain (rheumatoid arthritis). 60 tablet 3     ferrous sulfate (FEOSOL) 325 mg (65 mg iron) Tab tablet TAKE 1 TABLET BY MOUTH BEFORE EVENING MEAL. (Patient not taking: Reported on 12/14/2023) 90 tablet 0     No current facility-administered medications on file prior to visit.       Vitals:    05/16/24 0923   BP: 131/83   Pulse: 87       Physical Exam:    Physical Exam  Constitutional:       Appearance: She is well-developed.   HENT:      Head: Normocephalic and atraumatic.   Eyes:      Pupils: Pupils are equal, round, and reactive to light.   Cardiovascular:      Rate and Rhythm: Normal rate and regular rhythm.      Heart sounds: Normal heart sounds.   Pulmonary:      Effort: Pulmonary effort is normal.      Breath sounds: Normal breath sounds.   Musculoskeletal:      Right shoulder: No swelling or tenderness. Normal range of motion.      Left shoulder: No swelling or tenderness. Normal range of motion.      Right elbow: No swelling. Normal range of motion. No tenderness.      Left elbow: No swelling. Normal range of motion. No tenderness.      Right wrist: No swelling or tenderness. Normal range of motion.      Left wrist: No swelling or tenderness. Normal range of motion.      Right hand: Swelling and tenderness present. Normal range of motion.      Left hand: Swelling and tenderness present. Normal range of motion.      Cervical back: Normal range of motion.      Right knee: No swelling. Normal range of motion. No tenderness.      Left knee: No swelling. Normal range of motion. No tenderness.      Right foot: Normal range of motion. No swelling or tenderness.      Left foot: Normal range of motion. No swelling or tenderness.      Comments: Right 1MCP no synovitis   Left 5th MCP synovitis interdigital space. Mild TTP.   Early swan neck deformity.     + finkelstein's right wrist.      Skin:     General: Skin is warm and dry.   Neurological:      Mental Status: She is alert and oriented to person, place, and time.   Psychiatric:         Behavior: Behavior  normal.     Component      Latest Ref Rng & Units 8/8/2018   Sed Rate      0 - 20 mm/Hr 15   CRP      0.0 - 8.2 mg/L 10.0 (H)   Rheumatoid Factor      0.0 - 15.0 IU/mL 85.0 (H)   CCP Antibodies      <5.0 U/mL 646.5 (H)             Assessment:       Encounter Diagnoses   Name Primary?    Seropositive rheumatoid arthritis of multiple sites Yes    Long-term use of Plaquenil     Long-term use of immunosuppressant medication     Immunosuppression     DM type 2 with diabetic mixed hyperlipidemia               Plan:        Seropositive rheumatoid arthritis of multiple sites  -     ALT (SGPT); Future; Expected date: 05/16/2024  -     ALT (SGPT); Future; Expected date: 05/16/2024  -     CBC Auto Differential; Future; Expected date: 05/16/2024  -     C-Reactive Protein; Future; Expected date: 05/16/2024  -     Sedimentation rate; Future; Expected date: 05/16/2024  -     CREATININE, SERUM; Future; Expected date: 05/16/2024    Long-term use of Plaquenil  -     Ambulatory referral/consult to Optometry; Future; Expected date: 05/23/2024    Long-term use of immunosuppressant medication  -     ALT (SGPT); Future; Expected date: 05/16/2024  -     ALT (SGPT); Future; Expected date: 05/16/2024  -     CBC Auto Differential; Future; Expected date: 05/16/2024  -     C-Reactive Protein; Future; Expected date: 05/16/2024  -     Sedimentation rate; Future; Expected date: 05/16/2024  -     CREATININE, SERUM; Future; Expected date: 05/16/2024    Immunosuppression  -     ALT (SGPT); Future; Expected date: 05/16/2024  -     ALT (SGPT); Future; Expected date: 05/16/2024  -     CBC Auto Differential; Future; Expected date: 05/16/2024  -     C-Reactive Protein; Future; Expected date: 05/16/2024  -     Sedimentation rate; Future; Expected date: 05/16/2024  -     CREATININE, SERUM; Future; Expected date: 05/16/2024    DM type 2 with diabetic mixed hyperlipidemia  -     Ambulatory referral/consult to Optometry; Future; Expected date:  05/23/2024        1-4 Very pleasant 59 y/o female with hx of sero+ RA and felty's s/p splenectomy   LOV 2019, RTC today only missed approximately 3 months of therapy over 2020 and doing well.    -continue LFA 20mg daily (must have regular labs and f/u  with this medication!)    -add HCQ 200mg BID (must have annual eye exam)   -cotninue Diclofenac     Discussed with patient possibility for biologic therapy but having good response and tolerance with HCQ      Patient would like to avoid biologics if able.     5. T2DM- on Ozempic, amaryl, also will need ocular monitoring for this. Referral submitted back to Optometry.     No follow-ups on file.   F/u 4 months      40min consultation with greater than 50% of that time included Preparing to see the patient (review records, tests), Obtaining and/or reviewing separately obtained historical data, Performing a medically appropriate examination and/or evaluation , Ordering medications, tests, and/or procedures, Referring and communicating with other healthcare professionals , Documenting clinical information in the electronic or other health record and Independently interpreting results  (as warranted) & communicating results to the patient/family/caregiver. All questions answered.

## 2024-06-30 DIAGNOSIS — Z79.60 LONG-TERM USE OF IMMUNOSUPPRESSANT MEDICATION: ICD-10-CM

## 2024-06-30 DIAGNOSIS — Z79.899 HIGH RISK MEDICATIONS (NOT ANTICOAGULANTS) LONG-TERM USE: ICD-10-CM

## 2024-06-30 DIAGNOSIS — M05.79 SEROPOSITIVE RHEUMATOID ARTHRITIS OF MULTIPLE SITES: ICD-10-CM

## 2024-07-02 RX ORDER — LEFLUNOMIDE 20 MG/1
TABLET ORAL
Qty: 90 TABLET | Refills: 1 | Status: SHIPPED | OUTPATIENT
Start: 2024-07-02

## 2024-07-09 DIAGNOSIS — E11.40 TYPE 2 DIABETES MELLITUS WITH DIABETIC NEUROPATHY, WITHOUT LONG-TERM CURRENT USE OF INSULIN: ICD-10-CM

## 2024-07-09 RX ORDER — GLIMEPIRIDE 4 MG/1
4 TABLET ORAL
Qty: 90 TABLET | Refills: 0 | Status: SHIPPED | OUTPATIENT
Start: 2024-07-09

## 2024-07-09 RX ORDER — SEMAGLUTIDE 1.34 MG/ML
INJECTION, SOLUTION SUBCUTANEOUS
Qty: 9 ML | Refills: 0 | Status: SHIPPED | OUTPATIENT
Start: 2024-07-09 | End: 2024-07-10 | Stop reason: SDUPTHER

## 2024-07-09 NOTE — TELEPHONE ENCOUNTER
Care Due:                  Date            Visit Type   Department     Provider  --------------------------------------------------------------------------------                                MYCHART                              FOLLOWUP/OF  [facility]  Last Visit: 02-      FICE VISIT   MEDICINE       Rosales Boles  Next Visit: None Scheduled  None         None Found                                                            Last  Test          Frequency    Reason                     Performed    Due Date  --------------------------------------------------------------------------------    HBA1C.......  6 months...  empagliflozin-metformin,   02- 08-                             glimepiride, semaglutide.    [employer] Embedded Care Due Messages. Reference number: [id number].   7/09/2024 2:25:34 PM CDT

## 2024-07-10 RX ORDER — SEMAGLUTIDE 1.34 MG/ML
INJECTION, SOLUTION SUBCUTANEOUS
Qty: 3 ML | Refills: 0 | Status: SHIPPED | OUTPATIENT
Start: 2024-07-10

## 2024-07-10 NOTE — TELEPHONE ENCOUNTER
Provider Staff:  Action required for this patient    Requires labs      Please see care gap opportunities below in Care Due Message.    Thanks!  [facility]     Appointments      Date Provider   Last Visit   2/6/2024 Rosales Boles MD   Next Visit   8/2/2024 Rosales Boles MD     Refill Decision Note   Domi Leary  is requesting a refill authorization.  Brief Assessment and Rationale for Refill:  Approve     Medication Therapy Plan:         Comments:     Note composed:9:31 PM 07/09/2024

## 2024-07-10 NOTE — TELEPHONE ENCOUNTER
Sent in refill for a month.    Get her diabetes labs scheduled and done before visit with me next month  Ordered February

## 2024-07-19 DIAGNOSIS — E03.4 HYPOTHYROIDISM DUE TO ACQUIRED ATROPHY OF THYROID: ICD-10-CM

## 2024-07-19 DIAGNOSIS — E11.40 TYPE 2 DIABETES MELLITUS WITH DIABETIC NEUROPATHY, WITHOUT LONG-TERM CURRENT USE OF INSULIN: ICD-10-CM

## 2024-07-19 RX ORDER — LEVOTHYROXINE SODIUM 175 UG/1
175 TABLET ORAL
Qty: 90 TABLET | Refills: 1 | Status: SHIPPED | OUTPATIENT
Start: 2024-07-19

## 2024-07-19 RX ORDER — EMPAGLIFLOZIN, METFORMIN HYDROCHLORIDE 12.5; 1 MG/1; MG/1
1 TABLET, EXTENDED RELEASE ORAL
Qty: 90 TABLET | Refills: 0 | Status: SHIPPED | OUTPATIENT
Start: 2024-07-19

## 2024-07-19 NOTE — TELEPHONE ENCOUNTER
Refill Decision Note   Domi Leary  is requesting a refill authorization.  Brief Assessment and Rationale for Refill:  Approve     Medication Therapy Plan:        Comments:     Note composed:4:37 AM 07/19/2024

## 2024-07-19 NOTE — TELEPHONE ENCOUNTER
Sent in rx DM meds    She has appointment with me early August.  Make sure she gets lipid A1c TSH done for that appointment.      Reminder to keep appointment and labs done

## 2024-07-19 NOTE — TELEPHONE ENCOUNTER
No care due was identified.  [facility] Embedded Care Due Messages. Reference number: [id number].   7/19/2024 12:04:07 AM CDT

## 2024-07-22 DIAGNOSIS — E11.40 TYPE 2 DIABETES MELLITUS WITH DIABETIC NEUROPATHY, WITHOUT LONG-TERM CURRENT USE OF INSULIN: ICD-10-CM

## 2024-07-22 DIAGNOSIS — M05.79 SEROPOSITIVE RHEUMATOID ARTHRITIS OF MULTIPLE SITES: ICD-10-CM

## 2024-07-22 NOTE — TELEPHONE ENCOUNTER
No care due was identified.  [facility] Embedded Care Due Messages. Reference number: [id number].   7/22/2024 11:46:31 AM CDT

## 2024-07-23 RX ORDER — PRAVASTATIN SODIUM 10 MG/1
10 TABLET ORAL
Qty: 90 TABLET | Refills: 2 | Status: SHIPPED | OUTPATIENT
Start: 2024-07-23

## 2024-07-23 RX ORDER — HYDROXYCHLOROQUINE SULFATE 200 MG/1
TABLET, FILM COATED ORAL
Qty: 180 TABLET | Refills: 2 | Status: SHIPPED | OUTPATIENT
Start: 2024-07-23

## 2024-07-23 NOTE — TELEPHONE ENCOUNTER
Refill Decision Note   Domi Leary  is requesting a refill authorization.  Brief Assessment and Rationale for Refill:  Approve     Medication Therapy Plan:         Comments:     Note composed:9:50 AM 07/23/2024

## 2024-08-02 ENCOUNTER — LAB VISIT (OUTPATIENT)
Dept: LAB | Facility: HOSPITAL | Age: 59
End: 2024-08-02
Attending: INTERNAL MEDICINE
Payer: COMMERCIAL

## 2024-08-02 ENCOUNTER — OFFICE VISIT (OUTPATIENT)
Dept: FAMILY MEDICINE | Facility: CLINIC | Age: 59
End: 2024-08-02
Payer: COMMERCIAL

## 2024-08-02 VITALS
DIASTOLIC BLOOD PRESSURE: 64 MMHG | RESPIRATION RATE: 16 BRPM | OXYGEN SATURATION: 98 % | BODY MASS INDEX: 32.64 KG/M2 | HEIGHT: 63 IN | HEART RATE: 78 BPM | WEIGHT: 184.19 LBS | SYSTOLIC BLOOD PRESSURE: 122 MMHG

## 2024-08-02 DIAGNOSIS — E78.2 DM TYPE 2 WITH DIABETIC MIXED HYPERLIPIDEMIA: ICD-10-CM

## 2024-08-02 DIAGNOSIS — Z00.00 WELLNESS EXAMINATION: ICD-10-CM

## 2024-08-02 DIAGNOSIS — L40.9 PSORIASIS: Primary | ICD-10-CM

## 2024-08-02 DIAGNOSIS — E11.69 DM TYPE 2 WITH DIABETIC MIXED HYPERLIPIDEMIA: ICD-10-CM

## 2024-08-02 DIAGNOSIS — E03.4 HYPOTHYROIDISM DUE TO ACQUIRED ATROPHY OF THYROID: ICD-10-CM

## 2024-08-02 LAB
CHOLEST SERPL-MCNC: 171 MG/DL (ref 120–199)
CHOLEST/HDLC SERPL: 2.4 {RATIO} (ref 2–5)
ESTIMATED AVG GLUCOSE: 174 MG/DL (ref 68–131)
HBA1C MFR BLD: 7.7 % (ref 4–5.6)
HDLC SERPL-MCNC: 72 MG/DL (ref 40–75)
HDLC SERPL: 42.1 % (ref 20–50)
LDLC SERPL CALC-MCNC: 59.4 MG/DL (ref 63–159)
NONHDLC SERPL-MCNC: 99 MG/DL
T4 FREE SERPL-MCNC: 0.86 NG/DL (ref 0.71–1.51)
TRIGL SERPL-MCNC: 198 MG/DL (ref 30–150)
TSH SERPL DL<=0.005 MIU/L-ACNC: 4.13 UIU/ML (ref 0.4–4)

## 2024-08-02 PROCEDURE — 83036 HEMOGLOBIN GLYCOSYLATED A1C: CPT | Performed by: INTERNAL MEDICINE

## 2024-08-02 PROCEDURE — 84439 ASSAY OF FREE THYROXINE: CPT | Performed by: INTERNAL MEDICINE

## 2024-08-02 PROCEDURE — 99999 PR PBB SHADOW E&M-EST. PATIENT-LVL IV: CPT | Mod: PBBFAC,,, | Performed by: INTERNAL MEDICINE

## 2024-08-02 PROCEDURE — 84443 ASSAY THYROID STIM HORMONE: CPT | Performed by: INTERNAL MEDICINE

## 2024-08-02 PROCEDURE — 80061 LIPID PANEL: CPT | Performed by: INTERNAL MEDICINE

## 2024-08-02 PROCEDURE — 36415 COLL VENOUS BLD VENIPUNCTURE: CPT | Mod: PN | Performed by: INTERNAL MEDICINE

## 2024-08-02 RX ORDER — TRIAMCINOLONE ACETONIDE 1 MG/G
OINTMENT TOPICAL 2 TIMES DAILY
Qty: 453 G | Refills: 0 | Status: SHIPPED | OUTPATIENT
Start: 2024-08-02

## 2024-08-02 NOTE — PROGRESS NOTES
Subjective:       Patient ID: Domi Leary is a 59 y.o. female.    Chief Complaint: Diabetes   HPI    Gyn: Dr Sanchez menopause   MM/21 discussed patient defer  Dexa: never / recommended Rx placed  Colonoscopy:  O 3/23 r/c 5 yr   Immunizations: Flu: D Tdap:   due next visit Prevnar 13: 2016 Shingles:  Recommend  Smoker:  Former  Derm: Dr Ryne PARRA psoriasis  Eye: O   Prev PCP Dr Nicole  Reduced compliance w labs/visit /  discussed needs labs and visit Q 6 M     F/u   A1c over due TSH      Diabetes type 2:  improving.  Does not check sugar A1c  7.4  /   Rx Ozempic 1 mg, Glimepiride 4 q.d., Synjardy 1 q.d.  Prev A1c >14  // .  (SE: Metformin ER 1000 diarrhea. )     Hypothyroid: uncontrolled / patient taking correctly.  Recheck TSH today   Rx Levo 175      Mixed HLD:  LDL goal < 70// controlled // Rx Pravastatin 10     Elevated liver enzymes: Cyclic  resolved.   Chronic low albumin: Increase protein in diet.       Rheumatoid arthritis: stable // Rx Plaquenil 200, Leflunomide 20   Mgmt Rheum Dr Chakraborty    Off previous biologic     Iron Def:  Without anemia.  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    Prev mgmt Heme  Dr Calle      Metabolic syndrome BMI 33 & 187, 31 & 176, 32/181-184         ____________________________________________________________________________________________________  Assessment & Plan:  1. Psoriasis  - triamcinolone acetonide 0.1% (KENALOG) 0.1 % ointment; Apply topically 2 (two) times daily.  Dispense: 453 g; Refill: 0    2. DM type 2 with diabetic mixed hyperlipidemia  - Hemoglobin A1C; Future  - Lipid Panel; Future  - Lipid Panel; Future  - Hemoglobin A1C; Future    3. Hypothyroidism due to acquired atrophy of thyroid  - TSH; Future  - T4, Free; Future  - TSH; Future    4. Wellness examination  - TSH; Future  - T4, Free; Future  - Hemoglobin A1C; Future  - Lipid Panel; Future  - Lipid Panel;  Future  - Hemoglobin A1C; Future  - TSH; Future     Psoriasis  -     triamcinolone acetonide 0.1% (KENALOG) 0.1 % ointment; Apply topically 2 (two) times daily.  Dispense: 453 g; Refill: 0    DM type 2 with diabetic mixed hyperlipidemia  Comments:  Get labs today  Orders:  -     Hemoglobin A1C; Future; Expected date: 08/02/2024  -     Lipid Panel; Future; Expected date: 08/02/2024  -     Lipid Panel; Future; Expected date: 08/02/2024  -     Hemoglobin A1C; Future; Expected date: 08/02/2024    Hypothyroidism due to acquired atrophy of thyroid  -     TSH; Future; Expected date: 08/02/2024  -     T4, Free; Future; Expected date: 08/02/2024  -     TSH; Future; Expected date: 08/02/2024    Wellness examination  -     TSH; Future; Expected date: 08/02/2024  -     T4, Free; Future; Expected date: 08/02/2024  -     Hemoglobin A1C; Future; Expected date: 08/02/2024  -     Lipid Panel; Future; Expected date: 08/02/2024  -     Lipid Panel; Future; Expected date: 08/02/2024  -     Hemoglobin A1C; Future; Expected date: 08/02/2024  -     TSH; Future; Expected date: 08/02/2024        Continue to work on regular exercise, maintain healthy weight, balanced diet. Avoid unhealthy habits: smoking, excessive alcohol intake.     Disclaimer: This note was partly generated using dictation software which may occasionally result in transcription errors  ____________________________________________________________________________________________________  Review of Systems:  Review of Systems      Hand rash     Objective:     Wt Readings from Last 3 Encounters:   08/02/24 83.6 kg (184 lb 3.1 oz)   05/16/24 83.7 kg (184 lb 10.2 oz)   02/06/24 82.4 kg (181 lb 10.5 oz)     BP Readings from Last 3 Encounters:   08/02/24 122/64   05/16/24 131/83   02/06/24 126/74       Lab Results   Component Value Date    WBC 9.86 05/13/2024    HGB 14.4 05/13/2024    HCT 47.1 05/13/2024     05/13/2024     05/13/2024    K 4.2 05/13/2024      05/13/2024    ALT 25 05/13/2024    AST 19 05/13/2024    CO2 28 05/13/2024    CREATININE 0.8 05/13/2024    BUN 13 05/13/2024     (H) 05/13/2024      Hemoglobin A1C   Date Value Ref Range Status   02/06/2024 7.4 (H) 4.0 - 5.6 % Final     Comment:     ADA Screening Guidelines:  5.7-6.4%  Consistent with prediabetes  >or=6.5%  Consistent with diabetes    High levels of fetal hemoglobin interfere with the HbA1C  assay. Heterozygous hemoglobin variants (HbS, HgC, etc)do  not significantly interfere with this assay.   However, presence of multiple variants may affect accuracy.     05/08/2023 7.8 (H) 4.0 - 5.6 % Final     Comment:     ADA Screening Guidelines:  5.7-6.4%  Consistent with prediabetes  >or=6.5%  Consistent with diabetes    High levels of fetal hemoglobin interfere with the HbA1C  assay. Heterozygous hemoglobin variants (HbS, HgC, etc)do  not significantly interfere with this assay.   However, presence of multiple variants may affect accuracy.     10/29/2022 7.7 (H) 4.0 - 5.6 % Final     Comment:     ADA Screening Guidelines:  5.7-6.4%  Consistent with prediabetes  >or=6.5%  Consistent with diabetes    High levels of fetal hemoglobin interfere with the HbA1C  assay. Heterozygous hemoglobin variants (HbS, HgC, etc)do  not significantly interfere with this assay.   However, presence of multiple variants may affect accuracy.        Lab Results   Component Value Date    TSH 7.093 (H) 02/06/2024    TSH 2.732 05/08/2023    TSH 0.143 (L) 10/29/2022     Lab Results   Component Value Date    FREET4 1.02 02/06/2024    FREET4 1.00 05/08/2023    FREET4 1.13 10/29/2022     Lab Results   Component Value Date    LDLCALC 56.4 (L) 02/06/2024    LDLCALC 45.0 (L) 10/29/2022    LDLCALC 50.2 (L) 08/11/2021     Lab Results   Component Value Date    TRIG 178 (H) 02/06/2024    TRIG 80 10/29/2022    TRIG 144 08/11/2021            Physical Exam      Constitutional:       Appearance: Normal appearance.   HENT:      Head:  Normocephalic and atraumatic.   Eyes:      Extraocular Movements: Extraocular movements intact.      Pupils: Pupils are equal, round, and reactive to light.   Cardiovascular:      Rate and Rhythm: Normal rate.   Pulmonary:      Effort: Pulmonary effort is normal.   Neurological:      Mental Status: She is alert and oriented to person, place, and time.   Psychiatric:         Mood and Affect: Mood normal.     Protective Sensation (w/ 10 gram monofilament):  Right: Intact  Left: Intact    Visual Inspection:  Normal -  Bilateral    Pedal Pulses:   Right: Present  Left: Present    Posterior Tibialis Pulses:   Right:Present  Left: Present     Medication List with Changes/Refills   New Medications    TRIAMCINOLONE ACETONIDE 0.1% (KENALOG) 0.1 % OINTMENT    Apply topically 2 (two) times daily.   Current Medications    FERROUS SULFATE (FEOSOL) 325 MG (65 MG IRON) TAB TABLET    TAKE 1 TABLET BY MOUTH BEFORE EVENING MEAL.    GLIMEPIRIDE (AMARYL) 4 MG TABLET    Take 4 mg by mouth daily with breakfast.    HYDROXYCHLOROQUINE (PLAQUENIL) 200 MG TABLET    TAKE 1 TABLET BY MOUTH TWICE A DAY    LEFLUNOMIDE (ARAVA) 20 MG TAB    TAKE 1 TABLET BY MOUTH ONCE DAILY. MUST HAVE LABS REGULARLY TO CONTINUE THIS MEDICATION.    LEVOTHYROXINE (SYNTHROID, LEVOTHROID) 175 MCG TABLET    TAKE 1 TABLET (175 MCG TOTAL) BY MOUTH BEFORE BREAKFAST.    PRAVASTATIN (PRAVACHOL) 10 MG TABLET    TAKE 1 TABLET BY MOUTH EVERY DAY    SEMAGLUTIDE (OZEMPIC) 1 MG/DOSE (4 MG/3 ML)    INJECT 1 MG UNDER THE SKIN ONE TIME PER WEEK (keep upcoming appt w PCP)    SYNJARDY XR 12.5-1,000 MG TBPH    TAKE 1 TABLET BY MOUTH EVERY DAY    TRAMADOL (ULTRAM) 50 MG TABLET    Take 1 tablet (50 mg total) by mouth every 12 (twelve) hours as needed for Pain (rheumatoid arthritis).

## 2024-08-06 DIAGNOSIS — E11.69 DM TYPE 2 WITH DIABETIC MIXED HYPERLIPIDEMIA: Primary | ICD-10-CM

## 2024-08-06 DIAGNOSIS — E11.40 TYPE 2 DIABETES MELLITUS WITH DIABETIC NEUROPATHY, WITHOUT LONG-TERM CURRENT USE OF INSULIN: ICD-10-CM

## 2024-08-06 DIAGNOSIS — E03.4 HYPOTHYROIDISM DUE TO ACQUIRED ATROPHY OF THYROID: ICD-10-CM

## 2024-08-06 DIAGNOSIS — E78.2 DM TYPE 2 WITH DIABETIC MIXED HYPERLIPIDEMIA: Primary | ICD-10-CM

## 2024-08-06 DIAGNOSIS — Z00.00 WELLNESS EXAMINATION: ICD-10-CM

## 2024-08-06 RX ORDER — LEVOTHYROXINE SODIUM 200 UG/1
200 TABLET ORAL
Qty: 90 TABLET | Refills: 2 | Status: SHIPPED | OUTPATIENT
Start: 2024-08-06

## 2024-08-06 RX ORDER — EMPAGLIFLOZIN, METFORMIN HYDROCHLORIDE 12.5; 1 MG/1; MG/1
2 TABLET, EXTENDED RELEASE ORAL DAILY
Qty: 180 TABLET | Refills: 1 | Status: SHIPPED | OUTPATIENT
Start: 2024-08-06

## 2024-08-21 ENCOUNTER — TELEPHONE (OUTPATIENT)
Dept: FAMILY MEDICINE | Facility: CLINIC | Age: 59
End: 2024-08-21
Payer: COMMERCIAL

## 2024-08-21 NOTE — TELEPHONE ENCOUNTER
----- Message from Berenice Plata sent at 8/21/2024 10:22 AM CDT -----  Regarding: Pharm auth  Contact: pharm  Type:  Pharmacy Calling to Clarify an RX    Name of Caller:pharm    Pharmacy Name:  [id number] [place] [address] [facility] [address] [facility] [address]  [facility] [address]  [facility] [address]  Phone: [phone] Fax: [phone]    Prescription Name:   empagliflozin-metformin (SYNJARDY XR) 12.5-1,000 mg [facility]         Best Call Back Number:[phone]    Additional Information: insurance requires prior auth.

## 2024-08-21 NOTE — TELEPHONE ENCOUNTER
PA for Synjardy  initiated via [employer]    KEY: [id number]  Dx code: E11.40     Awaiting determination response:    Your information has been submitted to [facility]. To check for an updated outcome later, reopen this PA request from your dashboard.  If [facility] has not responded to your request within 24 hours, contact [facility] at [phone]. If you think there may be a problem with your PA request, use our live chat feature at the bottom right.

## 2024-08-22 DIAGNOSIS — E11.40 TYPE 2 DIABETES MELLITUS WITH DIABETIC NEUROPATHY, WITHOUT LONG-TERM CURRENT USE OF INSULIN: ICD-10-CM

## 2024-08-22 RX ORDER — SEMAGLUTIDE 1.34 MG/ML
INJECTION, SOLUTION SUBCUTANEOUS
Qty: 9 ML | Refills: 1 | Status: SHIPPED | OUTPATIENT
Start: 2024-08-22

## 2024-08-22 NOTE — TELEPHONE ENCOUNTER
Refill Decision Note   Domi Leary  is requesting a refill authorization.  Brief Assessment and Rationale for Refill:  Approve     Medication Therapy Plan:         Comments:     Note composed:6:16 PM 08/22/2024

## 2024-08-22 NOTE — TELEPHONE ENCOUNTER
No care due was identified.  [facility] Embedded Care Due Messages. Reference number: [id number].   8/22/2024 2:19:08 PM CDT

## 2024-09-10 ENCOUNTER — PATIENT MESSAGE (OUTPATIENT)
Dept: ADMINISTRATIVE | Facility: HOSPITAL | Age: 59
End: 2024-09-10
Payer: COMMERCIAL

## 2024-10-22 ENCOUNTER — TELEPHONE (OUTPATIENT)
Dept: RHEUMATOLOGY | Facility: CLINIC | Age: 59
End: 2024-10-22
Payer: COMMERCIAL

## 2024-10-22 NOTE — TELEPHONE ENCOUNTER
----- Message from Sonny sent at 10/22/2024  3:28 PM CDT -----  Hi the pt came here today, and she wants to get a lab orders from Dr. Chakraborty. And said give her a call when the lab orders is ready. Cell# [phone]

## 2024-10-22 NOTE — TELEPHONE ENCOUNTER
S/w and pt states she needs her lab orders faxed to [facility] in [place].  I faxed the orders as requested.    Che Wolfe MA  [facility]  10/22/2024

## 2024-10-26 LAB
ALT SERPL-CCNC: 20 U/L (ref 6–29)
BASOPHILS # BLD AUTO: 143 CELLS/UL (ref 0–200)
BASOPHILS NFR BLD AUTO: 1.4 %
CREAT SERPL-MCNC: 0.72 MG/DL (ref 0.5–1.03)
CRP SERPL-MCNC: NORMAL MG/L
EGFR: 97 ML/MIN/1.73M2
EOSINOPHIL # BLD AUTO: 428 CELLS/UL (ref 15–500)
EOSINOPHIL NFR BLD AUTO: 4.2 %
ERYTHROCYTE [DISTWIDTH] IN BLOOD BY AUTOMATED COUNT: 12.8 % (ref 11–15)
ERYTHROCYTE [SEDIMENTATION RATE] IN BLOOD BY WESTERGREN METHOD: 2 MM/H
HCT VFR BLD AUTO: 45.6 % (ref 35–45)
HGB BLD-MCNC: 14.6 G/DL (ref 11.7–15.5)
LYMPHOCYTES # BLD AUTO: 3754 CELLS/UL (ref 850–3900)
LYMPHOCYTES NFR BLD AUTO: 36.8 %
MCH RBC QN AUTO: 29 PG (ref 27–33)
MCHC RBC AUTO-ENTMCNC: 32 G/DL (ref 32–36)
MCV RBC AUTO: 90.7 FL (ref 80–100)
MONOCYTES # BLD AUTO: 928 CELLS/UL (ref 200–950)
MONOCYTES NFR BLD AUTO: 9.1 %
NEUTROPHILS # BLD AUTO: 4947 CELLS/UL (ref 1500–7800)
NEUTROPHILS NFR BLD AUTO: 48.5 %
PLATELET # BLD AUTO: 391 THOUSAND/UL (ref 140–400)
PMV BLD REES-ECKER: 11.5 FL (ref 7.5–12.5)
RBC # BLD AUTO: 5.03 MILLION/UL (ref 3.8–5.1)
WBC # BLD AUTO: 10.2 THOUSAND/UL (ref 3.8–10.8)

## 2024-10-28 LAB
ALT SERPL-CCNC: 20 U/L (ref 6–29)
BASOPHILS # BLD AUTO: 143 CELLS/UL (ref 0–200)
BASOPHILS NFR BLD AUTO: 1.4 %
CREAT SERPL-MCNC: 0.72 MG/DL (ref 0.5–1.03)
CRP SERPL-MCNC: 7.2 MG/L
EGFR: 97 ML/MIN/1.73M2
EOSINOPHIL # BLD AUTO: 428 CELLS/UL (ref 15–500)
EOSINOPHIL NFR BLD AUTO: 4.2 %
ERYTHROCYTE [DISTWIDTH] IN BLOOD BY AUTOMATED COUNT: 12.8 % (ref 11–15)
ERYTHROCYTE [SEDIMENTATION RATE] IN BLOOD BY WESTERGREN METHOD: 2 MM/H
HCT VFR BLD AUTO: 45.6 % (ref 35–45)
HGB BLD-MCNC: 14.6 G/DL (ref 11.7–15.5)
LYMPHOCYTES # BLD AUTO: 3754 CELLS/UL (ref 850–3900)
LYMPHOCYTES NFR BLD AUTO: 36.8 %
MCH RBC QN AUTO: 29 PG (ref 27–33)
MCHC RBC AUTO-ENTMCNC: 32 G/DL (ref 32–36)
MCV RBC AUTO: 90.7 FL (ref 80–100)
MONOCYTES # BLD AUTO: 928 CELLS/UL (ref 200–950)
MONOCYTES NFR BLD AUTO: 9.1 %
NEUTROPHILS # BLD AUTO: 4947 CELLS/UL (ref 1500–7800)
NEUTROPHILS NFR BLD AUTO: 48.5 %
PLATELET # BLD AUTO: 391 THOUSAND/UL (ref 140–400)
PMV BLD REES-ECKER: 11.5 FL (ref 7.5–12.5)
RBC # BLD AUTO: 5.03 MILLION/UL (ref 3.8–5.1)
WBC # BLD AUTO: 10.2 THOUSAND/UL (ref 3.8–10.8)

## 2024-10-31 ENCOUNTER — OFFICE VISIT (OUTPATIENT)
Dept: RHEUMATOLOGY | Facility: CLINIC | Age: 59
End: 2024-10-31
Payer: COMMERCIAL

## 2024-10-31 VITALS
HEART RATE: 98 BPM | BODY MASS INDEX: 32.58 KG/M2 | DIASTOLIC BLOOD PRESSURE: 87 MMHG | SYSTOLIC BLOOD PRESSURE: 130 MMHG | HEIGHT: 63 IN | WEIGHT: 183.88 LBS

## 2024-10-31 DIAGNOSIS — D84.9 IMMUNOSUPPRESSION: ICD-10-CM

## 2024-10-31 DIAGNOSIS — Z79.899 HIGH RISK MEDICATIONS (NOT ANTICOAGULANTS) LONG-TERM USE: ICD-10-CM

## 2024-10-31 DIAGNOSIS — Z79.60 LONG-TERM USE OF IMMUNOSUPPRESSANT MEDICATION: ICD-10-CM

## 2024-10-31 DIAGNOSIS — M05.79 SEROPOSITIVE RHEUMATOID ARTHRITIS OF MULTIPLE SITES: Primary | ICD-10-CM

## 2024-10-31 PROCEDURE — 99999 PR PBB SHADOW E&M-EST. PATIENT-LVL III: CPT | Mod: PBBFAC,,, | Performed by: INTERNAL MEDICINE

## 2024-10-31 PROCEDURE — 3061F NEG MICROALBUMINURIA REV: CPT | Mod: CPTII,S$GLB,, | Performed by: INTERNAL MEDICINE

## 2024-10-31 PROCEDURE — 3075F SYST BP GE 130 - 139MM HG: CPT | Mod: CPTII,S$GLB,, | Performed by: INTERNAL MEDICINE

## 2024-10-31 PROCEDURE — 3051F HG A1C>EQUAL 7.0%<8.0%: CPT | Mod: CPTII,S$GLB,, | Performed by: INTERNAL MEDICINE

## 2024-10-31 PROCEDURE — 3066F NEPHROPATHY DOC TX: CPT | Mod: CPTII,S$GLB,, | Performed by: INTERNAL MEDICINE

## 2024-10-31 PROCEDURE — 99214 OFFICE O/P EST MOD 30 MIN: CPT | Mod: S$GLB,,, | Performed by: INTERNAL MEDICINE

## 2024-10-31 PROCEDURE — 1159F MED LIST DOCD IN RCRD: CPT | Mod: CPTII,S$GLB,, | Performed by: INTERNAL MEDICINE

## 2024-10-31 PROCEDURE — 3008F BODY MASS INDEX DOCD: CPT | Mod: CPTII,S$GLB,, | Performed by: INTERNAL MEDICINE

## 2024-10-31 PROCEDURE — 3079F DIAST BP 80-89 MM HG: CPT | Mod: CPTII,S$GLB,, | Performed by: INTERNAL MEDICINE

## 2024-10-31 RX ORDER — HYDROXYCHLOROQUINE SULFATE 200 MG/1
TABLET, FILM COATED ORAL
Qty: 180 TABLET | Refills: 2 | Status: SHIPPED | OUTPATIENT
Start: 2024-10-31

## 2024-10-31 RX ORDER — LEFLUNOMIDE 20 MG/1
20 TABLET ORAL DAILY
Qty: 90 TABLET | Refills: 1 | Status: SHIPPED | OUTPATIENT
Start: 2024-10-31 | End: 2025-10-31

## 2024-10-31 RX ORDER — TRAMADOL HYDROCHLORIDE 50 MG/1
50 TABLET ORAL EVERY 12 HOURS PRN
Qty: 60 TABLET | Refills: 3 | Status: SHIPPED | OUTPATIENT
Start: 2024-10-31

## 2024-10-31 ASSESSMENT — ROUTINE ASSESSMENT OF PATIENT INDEX DATA (RAPID3)
PATIENT GLOBAL ASSESSMENT SCORE: 2
FATIGUE SCORE: 0
TOTAL RAPID3 SCORE: 2.11
PSYCHOLOGICAL DISTRESS SCORE: 2.2
MDHAQ FUNCTION SCORE: 0.4
PAIN SCORE: 3

## 2024-10-31 NOTE — PROGRESS NOTES
Subjective:          Chief Complaint: Domi Leary is a 59 y.o. female who had concerns including Disease Management.    HPI:    Patient is a 59-year-old female she has a history of rheumatoid arthritis dating back to 2003 she has had a history of possible of Felty syndrome with neutropenia recurrent infections and splenomegaly did undergo splenectomy which improved her neutropenia and no longer having serious infections.  Unfortunately by 2009 her arthritis flaring significantly    Doing well overall still with stiffness and loss of ROM at the right 2nd MCP but continues to be active.   Psoriasis on hand is back, only other episode with Enbrel in past. Using OTC hydrocortisone.     Current:  LFA 20mg will not fill w/o labs but appears her PCP did   HCQ 200mg BID (overdue for eye exam)     Feet, knees hips. This visit ok.     AM stiffness: <30 min   Worst for her is at night sitting still  Some dry eye, no dry mouth  Previous medications    Enbrel: diffuse psoriasis after starting Enbrel. Resolved with holding.       Component      Latest Ref Rng & Units 1/31/2018 9/4/2009   PIA      Neg <1:160  Negative   CCP Antibodies      <5.0 U/mL  637.7   CRP      0.0 - 8.2 mg/L 12.8 (H) 32.2 (H)   Sed Rate      0 - 20 mm/Hr 12 23 (H)   Rheumatoid Factor      0 - 15 IU/ml  157 (H)     REVIEW OF SYSTEMS:    Review of Systems   Constitutional:  Negative for fever, malaise/fatigue and weight loss.   HENT:  Negative for sore throat.    Eyes:  Negative for double vision, photophobia and redness.   Respiratory:  Negative for cough, shortness of breath and wheezing.    Cardiovascular:  Negative for chest pain, palpitations and orthopnea.   Gastrointestinal:  Negative for abdominal pain, constipation and diarrhea.   Genitourinary:  Negative for dysuria, hematuria and urgency.   Musculoskeletal:  Positive for joint pain. Negative for back pain and myalgias.   Skin:  Negative for rash.   Neurological:  Negative for dizziness,  tingling, focal weakness and headaches.   Endo/Heme/Allergies:  Does not bruise/bleed easily.   Psychiatric/Behavioral:  Negative for depression, hallucinations and suicidal ideas.                Objective:            Past Medical History:   Diagnosis Date    Diabetes 2/20/2012    Hypothyroid 2/20/2012    Rheumatoid arthritis 2/20/2012     Family History   Problem Relation Name Age of Onset    No Known Problems Mother      Ulcerative colitis Father      Cataracts Paternal Grandmother      Diabetes Maternal Aunt      Breast cancer Maternal Aunt      Glaucoma Neg Hx      Macular degeneration Neg Hx      Retinal detachment Neg Hx       Social History     Tobacco Use    Smoking status: Former     Current packs/day: 0.00     Types: Cigarettes     Quit date: 2/20/2009     Years since quitting: 15.7    Smokeless tobacco: Never   Substance Use Topics    Alcohol use: Yes     Comment: occassionally    Drug use: No         Current Outpatient Medications on File Prior to Visit   Medication Sig Dispense Refill    empagliflozin-metformin (SYNJARDY XR) 12.5-1,000 mg TBph Take 2 tablets by mouth once daily. 180 tablet 1    glimepiride (AMARYL) 4 MG tablet Take 4 mg by mouth daily with breakfast.      hydroxychloroquine (PLAQUENIL) 200 mg tablet TAKE 1 TABLET BY MOUTH TWICE A  tablet 2    leflunomide (ARAVA) 20 MG Tab TAKE 1 TABLET BY MOUTH ONCE DAILY. MUST HAVE LABS REGULARLY TO CONTINUE THIS MEDICATION. 90 tablet 1    levothyroxine (SYNTHROID) 200 MCG tablet Take 1 tablet (200 mcg total) by mouth before breakfast. 90 tablet 2    pravastatin (PRAVACHOL) 10 MG tablet TAKE 1 TABLET BY MOUTH EVERY DAY 90 tablet 2    semaglutide (OZEMPIC) 1 mg/dose (4 mg/3 mL) INJECT 1 MG UNDER THE SKIN ONE TIME PER WEEK (keep upcoming appt w PCP) 9 mL 1    traMADoL (ULTRAM) 50 mg tablet Take 1 tablet (50 mg total) by mouth every 12 (twelve) hours as needed for Pain (rheumatoid arthritis). 60 tablet 3    triamcinolone acetonide 0.1% (KENALOG)  0.1 % ointment Apply topically 2 (two) times daily. 453 g 0    ferrous sulfate (FEOSOL) 325 mg (65 mg iron) Tab tablet TAKE 1 TABLET BY MOUTH BEFORE EVENING MEAL. (Patient not taking: Reported on 10/31/2024) 90 tablet 0     No current facility-administered medications on file prior to visit.       Vitals:    10/31/24 1038   BP: 130/87   Pulse: 98       Physical Exam:    Physical Exam  Constitutional:       Appearance: She is well-developed.   HENT:      Head: Normocephalic and atraumatic.   Eyes:      Pupils: Pupils are equal, round, and reactive to light.   Cardiovascular:      Rate and Rhythm: Normal rate and regular rhythm.      Heart sounds: Normal heart sounds.   Pulmonary:      Effort: Pulmonary effort is normal.      Breath sounds: Normal breath sounds.   Musculoskeletal:      Right shoulder: No swelling or tenderness. Normal range of motion.      Left shoulder: No swelling or tenderness. Normal range of motion.      Right elbow: No swelling. Normal range of motion. No tenderness.      Left elbow: No swelling. Normal range of motion. No tenderness.      Right wrist: No swelling or tenderness. Normal range of motion.      Left wrist: No swelling or tenderness. Normal range of motion.      Right hand: Swelling and tenderness present. Normal range of motion.      Left hand: Swelling and tenderness present. Normal range of motion.      Cervical back: Normal range of motion.      Right knee: No swelling. Normal range of motion. No tenderness.      Left knee: No swelling. Normal range of motion. No tenderness.      Right foot: Normal range of motion. No swelling or tenderness.      Left foot: Normal range of motion. No swelling or tenderness.      Comments: Right 1MCP no synovitis   Left 5th MCP synovitis interdigital space. Mild TTP.   Early swan neck deformity.     + finkelstein's right wrist.      Skin:     General: Skin is warm and dry.   Neurological:      Mental Status: She is alert and oriented to person,  place, and time.   Psychiatric:         Behavior: Behavior normal.       Component      Latest Ref Rng & Units 8/8/2018   Sed Rate      0 - 20 mm/Hr 15   CRP      0.0 - 8.2 mg/L 10.0 (H)   Rheumatoid Factor      0.0 - 15.0 IU/mL 85.0 (H)   CCP Antibodies      <5.0 U/mL 646.5 (H)           Assessment:       No diagnosis found.        Plan:        There are no diagnoses linked to this encounter.      1-4 Very pleasant 57 y/o female with hx of sero+ RA and felty's s/p splenectomy   LOV 2019, RTC today only missed approximately 3 months of therapy over 2020 and doing well.    -continue LFA 20mg daily (must have regular labs and f/u  with this medication!)    -add HCQ 200mg BID (must have annual eye exam)   -cotninue Diclofenac     Discussed with patient possibility for biologic therapy but having good response and tolerance with HCQ    Patient would like to avoid biologics if able.     5. T2DM- on Ozempic, amaryl, also will need ocular monitoring for this. Referral submitted back to Optometry.     No follow-ups on file.   F/u 4 months      40min consultation with greater than 50% of that time included Preparing to see the patient (review records, tests), Obtaining and/or reviewing separately obtained historical data, Performing a medically appropriate examination and/or evaluation , Ordering medications, tests, and/or procedures, Referring and communicating with other healthcare professionals , Documenting clinical information in the electronic or other health record and Independently interpreting results  (as warranted) & communicating results to the patient/family/caregiver. All questions answered.

## 2024-12-13 ENCOUNTER — PATIENT MESSAGE (OUTPATIENT)
Dept: PHARMACY | Facility: CLINIC | Age: 59
End: 2024-12-13
Payer: COMMERCIAL

## 2024-12-17 ENCOUNTER — PATIENT MESSAGE (OUTPATIENT)
Dept: ADMINISTRATIVE | Facility: HOSPITAL | Age: 59
End: 2024-12-17
Payer: COMMERCIAL

## 2025-03-11 ENCOUNTER — TELEPHONE (OUTPATIENT)
Dept: FAMILY MEDICINE | Facility: CLINIC | Age: 60
End: 2025-03-11
Payer: COMMERCIAL

## 2025-03-11 NOTE — TELEPHONE ENCOUNTER
----- Message from Shay sent at 3/11/2025 10:56 AM CDT -----  Contact: Iveth  Type: Needs Medical AdviceWho Called:  Sarmad Call Back Number: [phone] Information: PT is getting assment done.  She has a lot of swelling to legs, ankles, abdomin, and is not on any lasix.  PT was recently discharged from .  Please call to discuss.

## 2025-03-13 ENCOUNTER — OFFICE VISIT (OUTPATIENT)
Dept: FAMILY MEDICINE | Facility: CLINIC | Age: 60
End: 2025-03-13
Payer: COMMERCIAL

## 2025-03-13 VITALS
HEIGHT: 63 IN | BODY MASS INDEX: 35.43 KG/M2 | WEIGHT: 199.94 LBS | SYSTOLIC BLOOD PRESSURE: 126 MMHG | DIASTOLIC BLOOD PRESSURE: 76 MMHG | HEART RATE: 92 BPM

## 2025-03-13 DIAGNOSIS — M05.79 SEROPOSITIVE RHEUMATOID ARTHRITIS OF MULTIPLE SITES: ICD-10-CM

## 2025-03-13 DIAGNOSIS — E61.1 IRON DEFICIENCY: ICD-10-CM

## 2025-03-13 DIAGNOSIS — R60.9 FLUID RETENTION: ICD-10-CM

## 2025-03-13 DIAGNOSIS — J31.0 RHINITIS, UNSPECIFIED TYPE: ICD-10-CM

## 2025-03-13 DIAGNOSIS — E78.2 DM TYPE 2 WITH DIABETIC MIXED HYPERLIPIDEMIA: ICD-10-CM

## 2025-03-13 DIAGNOSIS — E03.4 HYPOTHYROIDISM DUE TO ACQUIRED ATROPHY OF THYROID: ICD-10-CM

## 2025-03-13 DIAGNOSIS — Z09 HOSPITAL DISCHARGE FOLLOW-UP: ICD-10-CM

## 2025-03-13 DIAGNOSIS — I50.9 HEART FAILURE, UNSPECIFIED HF CHRONICITY, UNSPECIFIED HEART FAILURE TYPE: Primary | ICD-10-CM

## 2025-03-13 DIAGNOSIS — E11.69 DM TYPE 2 WITH DIABETIC MIXED HYPERLIPIDEMIA: ICD-10-CM

## 2025-03-13 DIAGNOSIS — E78.2 MIXED HYPERLIPIDEMIA: ICD-10-CM

## 2025-03-13 DIAGNOSIS — E66.01 SEVERE OBESITY (BMI 35.0-39.9) WITH COMORBIDITY: ICD-10-CM

## 2025-03-13 PROCEDURE — 1160F RVW MEDS BY RX/DR IN RCRD: CPT | Mod: CPTII,S$GLB,, | Performed by: NURSE PRACTITIONER

## 2025-03-13 PROCEDURE — 3008F BODY MASS INDEX DOCD: CPT | Mod: CPTII,S$GLB,, | Performed by: NURSE PRACTITIONER

## 2025-03-13 PROCEDURE — 3078F DIAST BP <80 MM HG: CPT | Mod: CPTII,S$GLB,, | Performed by: NURSE PRACTITIONER

## 2025-03-13 PROCEDURE — 99214 OFFICE O/P EST MOD 30 MIN: CPT | Mod: S$GLB,,, | Performed by: NURSE PRACTITIONER

## 2025-03-13 PROCEDURE — 99999 PR PBB SHADOW E&M-EST. PATIENT-LVL V: CPT | Mod: PBBFAC,,, | Performed by: NURSE PRACTITIONER

## 2025-03-13 PROCEDURE — 3074F SYST BP LT 130 MM HG: CPT | Mod: CPTII,S$GLB,, | Performed by: NURSE PRACTITIONER

## 2025-03-13 PROCEDURE — 3051F HG A1C>EQUAL 7.0%<8.0%: CPT | Mod: CPTII,S$GLB,, | Performed by: NURSE PRACTITIONER

## 2025-03-13 PROCEDURE — 1159F MED LIST DOCD IN RCRD: CPT | Mod: CPTII,S$GLB,, | Performed by: NURSE PRACTITIONER

## 2025-03-13 RX ORDER — METOPROLOL SUCCINATE 200 MG/1
200 TABLET, EXTENDED RELEASE ORAL
COMMUNITY

## 2025-03-13 RX ORDER — ISOSORBIDE MONONITRATE 30 MG/1
30 TABLET, EXTENDED RELEASE ORAL
COMMUNITY

## 2025-03-13 RX ORDER — BLOOD-GLUCOSE SENSOR
EACH MISCELLANEOUS
Qty: 6 EACH | Refills: 0 | Status: SHIPPED | OUTPATIENT
Start: 2025-03-13

## 2025-03-13 RX ORDER — CETIRIZINE HYDROCHLORIDE 10 MG/1
10 TABLET ORAL NIGHTLY
Qty: 30 TABLET | Refills: 0 | Status: SHIPPED | OUTPATIENT
Start: 2025-03-13 | End: 2026-03-13

## 2025-03-13 RX ORDER — FLASH GLUCOSE SCANNING READER
1 EACH MISCELLANEOUS
Qty: 1 EACH | Refills: 11 | Status: SHIPPED | OUTPATIENT
Start: 2025-03-13

## 2025-03-13 RX ORDER — HYDRALAZINE HYDROCHLORIDE 25 MG/1
25 TABLET, FILM COATED ORAL EVERY 8 HOURS
COMMUNITY

## 2025-03-13 RX ORDER — FLASH GLUCOSE SENSOR
1 KIT MISCELLANEOUS
Qty: 1 KIT | Refills: 11 | Status: SHIPPED | OUTPATIENT
Start: 2025-03-13 | End: 2025-03-13

## 2025-03-13 RX ORDER — PANTOPRAZOLE SODIUM 40 MG/1
40 TABLET, DELAYED RELEASE ORAL DAILY PRN
COMMUNITY
Start: 2025-02-28 | End: 2026-02-28

## 2025-03-13 RX ORDER — MULTIVITAMIN
1 TABLET ORAL DAILY
COMMUNITY
Start: 2025-03-01 | End: 2026-03-01

## 2025-03-13 RX ORDER — SODIUM BICARBONATE 650 MG/1
1300 TABLET ORAL 3 TIMES DAILY
COMMUNITY

## 2025-03-13 RX ORDER — AMIODARONE HYDROCHLORIDE 400 MG/1
400 TABLET ORAL 2 TIMES DAILY
COMMUNITY
Start: 2025-02-28 | End: 2025-06-03

## 2025-03-13 RX ORDER — FUROSEMIDE 20 MG/1
20 TABLET ORAL DAILY
Qty: 30 TABLET | Refills: 1 | Status: SHIPPED | OUTPATIENT
Start: 2025-03-13 | End: 2026-03-13

## 2025-03-13 NOTE — PROGRESS NOTES
THIS DOCUMENT WAS MADE IN PART WITH VOICE RECOGNITION SOFTWARE.  OCCASIONALLY THIS SOFTWARE WILL MISINTERPRET WORDS OR PHRASES.     Assessment and Plan:    Heart failure, unspecified HF chronicity, unspecified heart failure type  -     furosemide (LASIX) 20 MG tablet; Take 1 tablet (20 mg total) by mouth once daily.  Dispense: 30 tablet; Refill: 1  -     Ambulatory referral/consult to Cardiology; Future; Expected date: 03/20/2025  -     Comprehensive Metabolic Panel; Future; Expected date: 03/13/2025  -     CBC Without Differential; Future; Expected date: 03/13/2025    Hospital discharge follow-up  -     Ambulatory referral/consult to Cardiology; Future; Expected date: 03/20/2025    DM type 2 with diabetic mixed hyperlipidemia  Comments:  A1c 7.9  Continue regimen  Monitor blood sugar  Follow diabetic diet  Orders:  -     Discontinue: flash glucose sensor (FREESTYLE ZAY 14 DAY SENSOR) Kit; 1 Units by Misc.(Non-Drug; Combo Route) route every 14 (fourteen) days.  Dispense: 1 kit; Refill: 11  -     flash glucose scanning reader (FREESTYLE ZAY 14 DAY READER) Misc; 1 Units by Misc.(Non-Drug; Combo Route) route every 14 (fourteen) days.  Dispense: 1 each; Refill: 11  -     Lipid Panel; Future; Expected date: 03/13/2025    Hypothyroidism due to acquired atrophy of thyroid  Comments:  Uncontrolled  Continue levothyroxine as prescribed  Repeat TSH  Orders:  -     TSH; Future; Expected date: 03/13/2025  -     T4, Free; Future; Expected date: 03/13/2025    Rhinitis, unspecified type  Comments:  Symptomatic treatment discussed  Avoid known triggers  Zyrtec nightly  Orders:  -     cetirizine (ZYRTEC) 10 MG tablet; Take 1 tablet (10 mg total) by mouth every evening.  Dispense: 30 tablet; Refill: 0    Seropositive rheumatoid arthritis of multiple sites  Comments:  Continue regimen  Keep follow up with Rheumatology    Mixed hyperlipidemia  Comments:  Continue pravastatin    Fluid retention  -     furosemide (LASIX) 20 MG  tablet; Take 1 tablet (20 mg total) by mouth once daily.  Dispense: 30 tablet; Refill: 1  -     Comprehensive Metabolic Panel; Future; Expected date: 03/13/2025    Iron deficiency  -     Iron and TIBC; Future; Expected date: 03/13/2025  -     Ferritin; Future; Expected date: 03/13/2025    Severe obesity (BMI 35.0-39.9) with comorbidity  Comments:  Continue working on diet and exercise- increase physical activity as tolerated             Visit summary:  Hospital course and diagnostic studies reviewed in detail with patient during today's visit.    Patient advised to keep follow up with specialists.    Emergent conditions/ER precautions discussed.        Follow up in about 4 weeks (around 4/10/2025) for Follow-up with PCP- Dr. Chandler Jackman.   ______________________________________________________________________  Subjective:    Chief Complaint:  Hospital follow up    HPI:  Domi is a 60 y.o. year old female with a past medical history noted below, here for hospital follow up.  Patient was admitted to [place] on February 5th after presenting to the ER complaining of flu-like symptoms.  Patient discharged on February 28th      History of Present Illness    CHIEF COMPLAINT:  Patient presents today for puffiness and hospital follow-up.    RECENT HOSPITALIZATION:  She experienced a 23-day hospitalization beginning February 5th, initially presenting with flu-like symptoms. By the second day, she developed severe leg weakness requiring paramedic assistance. Her condition progressed to sepsis with multi-organ failure requiring care from multiple specialist teams.  She developed new onset heart failure and was advised to follow up with cardiologist upon discharge.  She is requesting referral to cardiologist on the [facility].  She is also requesting order for continuous glucose monitoring.  She reports difficulty monitoring blood sugar.     CURRENT SYMPTOMS:  She reports generalized puffiness and fluid retention with  associated weight gain from 185 to 199 lbs since hospital discharge. The swelling is impacting her ability to perform prescribed physical therapy exercises. She also reports 2-day history of throat pain and post nasal drip when standing.      HOME CARE:  She receives weekly home health nursing visits.        See hospital course below:    Hospital Course: Domi Leary is a 59 y.o. female with  PMH rheumatoid DM2, HLD, seropositive rheumatoid arthritis, Felty syndrome with splenectomy, hypothyroidism who presented to the [facility] ED initially on 2/5/2025 for 4-day history of flu-like symptoms, including fever, SOB, productive cough, vomiting, and an inability tolerate solid food. She was admitted to [facility] MICU on 2/5/25 for sepsis with multiorgan failure. Echo demonstrated new onset HFrEF with severe LV dysfunction (EF of 10-15%), requiring right heart cath on 2/6 to assess hemodynamic status, determined to be mixed distributive/cardiogenic shock. The patient was also noted to have worsening renal function, with Cr elevation from baseline of 0.7. Patient was transferred to CCU on 2/7, started on dobutamine initially but switched to amiodarone after developing SVT. Renal function continued to worsen requiring HD on 2/11, 2/12, 2/14. Patient hospital stay was complicated after  a spontaneous retroperitoneal bleeding, anterior branch of left internal illiac with unknown source by IR in 2/11. Hgb decrease from 9 to 7 overnight of 2/10-2/11, given 3 units pRBC on 2/11 and 2 units on 2/12. Bilateral US consistent with nonocclusive thrombus in the mid right superficial femoral vein, and nonocclusive thrombus in the left peroneal vein in the calf.     Patient was stepped down to the floor from CCU on 2/14. After reviewing risks and benefits patient decided for an IVC filter vs undergoing heparin re-challenged. Patient IVC filter was placed on 2/19. Patient then converted back on sustained SVT that was not controlled with  metoprolol, amiodarone, and pushes of adenosine. Patient underwent cardioversion 2/27 converting back to sinus rhythm. As part of the work-up for SVT, she underwent a V/Q on 2/24 showing multiple segmental defects in lower lobes of lung bilaterally. Based on shared decisions between patient and  cardiology, patient will continue to hold heparin gtt for now.      In addition, when patient initially presented to the hospital, she was found in iatrogenic hyperthyroidism. Patient was on home dose synthroid 200 mg. She was then discontinued, and started at a lower dose Synthroid 200 mg daily on 2/16. Inpatient endocrinology recommending re-check of labs within 6 -8 weeks after re-starting Synthroid.      Patient was stable for discharge on 2/28. Patient did not meet criteria for SNF since she was able to walk more than 250 ft. When was discharge home with PT/OT follow-up. Patient should have follow-up appointment for IV filter removal after 3 months of placement, and ongoing discussion of anticoagulation.        Chronic conditions reviewed below.    Diabetes type 2:  improving.  Does not check sugar A1c  7.9  /   Rx Ozempic 1 mg, Glimepiride 4 q.d., Synjardy 1 q.d.  Prev A1c >14  // .  (SE: Metformin ER 1000 diarrhea. )     Hypothyroid: uncontrolled / patient taking correctly.  Recheck TSH today   Rx levothyroxine 200 mcg      Mixed HLD:  LDL goal < 70// controlled // Rx Pravastatin 10 mg     Elevated liver enzymes: Cyclic  resolved.   Chronic low albumin: Increase protein in diet.       Rheumatoid arthritis: stable // Rx Plaquenil 200 mg, Leflunomide 20 mg  Mgmt Rheum Dr Chakraborty               Off previous biologic     Iron Def:  Without anemia.  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    Prev mgmt Heme  Dr Calle      Metabolic syndrome BMI 33 & 187, 31 & 176, 32/181-184, 35/199    Medications:  Medications Ordered Prior to  "Encounter[1]    Review of Systems:  Review of Systems   Constitutional:  Negative for chills, fatigue, fever and unexpected weight change.   HENT:  Positive for postnasal drip and rhinorrhea. Negative for congestion.    Respiratory:  Positive for shortness of breath. Negative for cough and chest tightness.    Cardiovascular:  Positive for leg swelling. Negative for chest pain.   Gastrointestinal:  Negative for abdominal distention, abdominal pain, constipation, diarrhea, nausea and vomiting.   Genitourinary:  Negative for difficulty urinating and dysuria.   Musculoskeletal:  Negative for arthralgias and back pain.   Neurological:  Negative for dizziness and headaches.       Past Medical History:  Past Medical History:   Diagnosis Date    Diabetes 2/20/2012    Hypothyroid 2/20/2012    Rheumatoid arthritis 2/20/2012       Objective:    Vitals:  Vitals:    03/13/25 0855   BP: 126/76   Pulse: 92   Weight: 90.7 kg (199 lb 15.3 oz)   Height: 5' 3" (1.6 m)   PainSc: 0-No pain       Physical Exam  Vitals and nursing note reviewed.   Constitutional:       General: She is not in acute distress.     Appearance: She is obese.   HENT:      Head: Normocephalic and atraumatic.      Nose: Rhinorrhea present. Rhinorrhea is clear.      Mouth/Throat:      Mouth: Mucous membranes are moist.      Pharynx: Oropharynx is clear. Postnasal drip present. No posterior oropharyngeal erythema.   Eyes:      General: No scleral icterus.     Conjunctiva/sclera: Conjunctivae normal.   Cardiovascular:      Rate and Rhythm: Normal rate and regular rhythm.   Pulmonary:      Effort: Pulmonary effort is normal. No respiratory distress.      Breath sounds: Normal breath sounds. No wheezing or rhonchi.   Musculoskeletal:      Right lower leg: No edema.      Left lower leg: No edema.   Skin:     General: Skin is warm and dry.   Neurological:      Mental Status: She is alert and oriented to person, place, and time.   Psychiatric:         Mood and Affect: " Mood normal.         Behavior: Behavior normal.         Thought Content: Thought content normal.         Data:  Lab Results   Component Value Date    TSH 14.62 (H) 02/25/2025      CMP  Sodium   Date Value Ref Range Status   02/28/2025 139 135 - 146 mmol/L Final   05/13/2024 141 136 - 145 mmol/L Final     Potassium   Date Value Ref Range Status   02/28/2025 4.0 3.6 - 5.2 mmol/L Final   05/13/2024 4.2 3.5 - 5.1 mmol/L Final     Chloride   Date Value Ref Range Status   05/13/2024 106 95 - 110 mmol/L Final     CO2   Date Value Ref Range Status   05/13/2024 28 23 - 29 mmol/L Final     Carbon Dioxide   Date Value Ref Range Status   02/28/2025 21 (L) 24 - 32 mmol/L Final     Glucose   Date Value Ref Range Status   05/13/2024 207 (H) 70 - 110 mg/dL Final     BUN   Date Value Ref Range Status   02/28/2025 67.0 (H) 7.0 - 25.0 mg/dL Final   05/13/2024 13 6 - 20 mg/dL Final     Creatinine   Date Value Ref Range Status   02/28/2025 4.15 (H) 0.50 - 1.10 mg/dL Final   10/25/2024 0.72 0.50 - 1.03 mg/dL Final     Calcium   Date Value Ref Range Status   02/28/2025 9.0 8.4 - 10.3 mg/dL Final   05/13/2024 10.0 8.7 - 10.5 mg/dL Final     Total Protein   Date Value Ref Range Status   05/13/2024 7.2 6.0 - 8.4 g/dL Final     Albumin   Date Value Ref Range Status   02/28/2025 2.9 (L) 3.4 - 5.0 g/dL Final   05/13/2024 3.3 (L) 3.5 - 5.2 g/dL Final     Total Bilirubin   Date Value Ref Range Status   02/28/2025 1.5 (H) <1.3 mg/dL Final   05/13/2024 0.3 0.1 - 1.0 mg/dL Final     Comment:     For infants and newborns, interpretation of results should be based  on gestational age, weight and in agreement with clinical  observations.    Premature Infant recommended reference ranges:  Up to 24 hours.............<8.0 mg/dL  Up to 48 hours............<12.0 mg/dL  3-5 days..................<15.0 mg/dL  6-29 days.................<15.0 mg/dL       Alkaline Phosphatase   Date Value Ref Range Status   05/13/2024 90 55 - 135 U/L Final     AST   Date Value  Ref Range Status   02/28/2025 25 <45 U/L Final   05/13/2024 19 10 - 40 U/L Final     ALT   Date Value Ref Range Status   02/28/2025 33 <46 U/L Final   10/25/2024 20 6 - 29 U/L Final     Anion Gap   Date Value Ref Range Status   02/28/2025 9 8 - 16 Final     Comment:     Calculation does not include K+   05/13/2024 7 (L) 8 - 16 mmol/L Final     eGFR   Date Value Ref Range Status   02/28/2025 12 (L) >=90 mL/min/1.73m2 Final     Comment:     Calculation based on the Chronic Kidney Disease Epidemiology Collaboration (CKD-EPI) equation refit without adjustment for race.   10/25/2024 97 > OR = 60 mL/min/1.73m2 Final         Lab Results   Component Value Date    HGBA1C 7.9 (H) 02/06/2025    HGBA1C 7.7 (H) 08/02/2024    HGBA1C 7.4 (H) 02/06/2024      Medical history reviewed, Medications reconciled.              [facility]  Family Medicine         [1]   Current Outpatient Medications on File Prior to Visit   Medication Sig Dispense Refill    amiodarone (PACERONE) 400 MG tablet Take 400 mg by mouth 2 (two) times daily.      empagliflozin-metformin (SYNJARDY XR) 12.5-1,000 mg TBph Take 2 tablets by mouth once daily. 180 tablet 1    glimepiride (AMARYL) 4 MG tablet Take 4 mg by mouth daily with breakfast.      hydrALAZINE (APRESOLINE) 25 MG tablet Take 25 mg by mouth every 8 (eight) hours.      isosorbide mononitrate (IMDUR) 30 MG 24 hr tablet Take 30 mg by mouth.      levothyroxine (SYNTHROID) 200 MCG tablet Take 1 tablet (200 mcg total) by mouth before breakfast. 90 tablet 2    metoprolol succinate (TOPROL-XL) 200 MG 24 hr tablet Take 200 mg by mouth.      multivitamin with folic acid 400 mcg Tab Take 1 tablet by mouth once daily.      pantoprazole (PROTONIX) 40 MG tablet Take 40 mg by mouth daily as needed.      pravastatin (PRAVACHOL) 10 MG tablet TAKE 1 TABLET BY MOUTH EVERY DAY 90 tablet 2    sodium bicarbonate 650 MG tablet Take 1,300 mg by mouth 3 (three) times daily.      ferrous sulfate (FEOSOL) 325 mg  (65 mg iron) Tab tablet TAKE 1 TABLET BY MOUTH BEFORE EVENING MEAL. (Patient not taking: Reported on 3/13/2025) 90 tablet 0    hydroxychloroquine (PLAQUENIL) 200 mg tablet TAKE 1 TABLET BY MOUTH TWICE A DAY (Patient not taking: Reported on 3/13/2025) 180 tablet 2    leflunomide (ARAVA) 20 MG Tab Take 1 tablet (20 mg total) by mouth once daily. (Patient not taking: Reported on 3/13/2025) 90 tablet 1    semaglutide (OZEMPIC) 1 mg/dose (4 mg/3 mL) INJECT 1 MG UNDER THE SKIN ONE TIME PER WEEK (keep upcoming appt w PCP) (Patient not taking: Reported on 3/13/2025) 9 mL 1    traMADoL (ULTRAM) 50 mg tablet Take 1 tablet (50 mg total) by mouth every 12 (twelve) hours as needed for Pain (rheumatoid arthritis). (Patient not taking: Reported on 3/13/2025) 60 tablet 3    triamcinolone acetonide 0.1% (KENALOG) 0.1 % ointment Apply topically 2 (two) times daily. (Patient not taking: Reported on 3/13/2025) 453 g 0     No current facility-administered medications on file prior to visit.

## 2025-03-13 NOTE — TELEPHONE ENCOUNTER
Refill Routing Note   Medication(s) are not appropriate for processing by [facility] for the following reason(s):        Clarification of medication (Rx) details-Other Freestyle types are phasing out. Freestyle 3 is current and NO Melissa needed. Only sensors    [facility] action(s):  Defer        Medication Therapy Plan: Original Freestyle has been phased out. Please change to Freestyle 3. NO reader needed. just sensors      Appointments  past 12m or future 3m with PCP    Date Provider   Last Visit   3/13/2025 Aliya Whelan FNP   Next Visit   Visit date not found Aliya Whelan FNP   ED visits in past 90 days: 0        Note composed:12:40 PM 03/13/2025

## 2025-03-17 ENCOUNTER — PATIENT OUTREACH (OUTPATIENT)
Dept: ADMINISTRATIVE | Facility: HOSPITAL | Age: 60
End: 2025-03-17
Payer: COMMERCIAL

## 2025-03-17 NOTE — PROGRESS NOTES
[facility] Patients with Rising Risk of ED/Admission >= 20 Points in 30 Days report    Pt seen 3.13.2025

## 2025-03-18 ENCOUNTER — TELEPHONE (OUTPATIENT)
Dept: NEPHROLOGY | Facility: CLINIC | Age: 60
End: 2025-03-18
Payer: COMMERCIAL

## 2025-03-18 ENCOUNTER — OFFICE VISIT (OUTPATIENT)
Dept: FAMILY MEDICINE | Facility: CLINIC | Age: 60
End: 2025-03-18
Payer: COMMERCIAL

## 2025-03-18 VITALS
HEART RATE: 77 BPM | HEIGHT: 63 IN | DIASTOLIC BLOOD PRESSURE: 60 MMHG | WEIGHT: 201.5 LBS | BODY MASS INDEX: 35.7 KG/M2 | SYSTOLIC BLOOD PRESSURE: 120 MMHG | OXYGEN SATURATION: 98 %

## 2025-03-18 DIAGNOSIS — R18.8 OTHER ASCITES: ICD-10-CM

## 2025-03-18 DIAGNOSIS — R60.0 LOWER EXTREMITY EDEMA: Primary | ICD-10-CM

## 2025-03-18 DIAGNOSIS — L03.119 CELLULITIS OF LOWER EXTREMITY, UNSPECIFIED LATERALITY: ICD-10-CM

## 2025-03-18 DIAGNOSIS — I50.20 HEART FAILURE WITH REDUCED EJECTION FRACTION: ICD-10-CM

## 2025-03-18 DIAGNOSIS — N17.9 AKI (ACUTE KIDNEY INJURY): ICD-10-CM

## 2025-03-18 PROCEDURE — 99999 PR PBB SHADOW E&M-EST. PATIENT-LVL V: CPT | Mod: PBBFAC,,, | Performed by: NURSE PRACTITIONER

## 2025-03-18 RX ORDER — DOXYCYCLINE 100 MG/1
100 CAPSULE ORAL 2 TIMES DAILY
Qty: 14 CAPSULE | Refills: 0 | Status: SHIPPED | OUTPATIENT
Start: 2025-03-18 | End: 2025-03-25

## 2025-03-18 NOTE — TELEPHONE ENCOUNTER
Received call from Reinaldo Adams with Nurse Practitioner Khushbu Guerrero's office. She called on behalf of the patient to request an appointment regarding recent labs.     New appointment made for 03/27/25 at 1320 with Dr. Barrientos. Ms. Adams said she will inform patient of appointment time and date.     Message sent to patient via [facility].

## 2025-03-18 NOTE — PROGRESS NOTES
"Subjective     Patient ID: Domi Leary is a 60 y.o. female.    Chief Complaint: fluid      HPI      Patient is new to me. PCP is Dr. Boles.    61 y/o F with DM2, HTN, HLD, RA, Hypothyroid, Asplenia presents to clinic for c/o fluid retention. Recently in the hospital for sepsis with multi-organ failure after deven influenza in February. She had a new onset of HFrEF with severe LV dysfunction (EF10-15%). She developed SVT requiring amiodorone and an IVC filter. She presents today for increasing LE edema and now redness. She denies any fever but has been getting chills. Her abdomen is also more distended.     Review of Systems   Constitutional:  Positive for chills. Negative for fever.   Cardiovascular:  Positive for leg swelling.          Objective   Vitals:    25 1346   BP: 120/60   Pulse: 77   SpO2: 98%   Weight: 91.4 kg (201 lb 8 oz)   Height: 5' 3" (1.6 m)      Physical Exam  Constitutional:       General: She is not in acute distress.     Appearance: Normal appearance. She is obese. She is not ill-appearing, toxic-appearing or diaphoretic.   HENT:      Head: Normocephalic and atraumatic.   Cardiovascular:      Rate and Rhythm: Normal rate and regular rhythm.      Heart sounds: Normal heart sounds. No murmur heard.     No friction rub. No gallop.   Pulmonary:      Effort: No respiratory distress.      Breath sounds: No stridor. Examination of the right-lower field reveals decreased breath sounds. Examination of the left-lower field reveals decreased breath sounds. Decreased breath sounds present. No wheezing, rhonchi or rales.   Chest:      Chest wall: No tenderness.   Abdominal:      Comments: +ascites   Musculoskeletal:      Right lower le+ Pitting Edema present.      Left lower le+ Pitting Edema present.   Skin:     Findings: Erythema (patches of non-blancing erythema to bilateral LEs.) present.   Neurological:      General: No focal deficit present.      Mental Status: She is " alert and oriented to person, place, and time. Mental status is at baseline.   Psychiatric:         Mood and Affect: Mood normal.         Behavior: Behavior normal.         Thought Content: Thought content normal.         Judgment: Judgment normal.         1. Lower extremity edema  - B-TYPE NATRIURETIC PEPTIDE; Future  -Increase lasix to 40mg daily  -has labs scheduled in AM  -awaiting status of renal function     2. Heart failure with reduced ejection fraction  -saw cardiology  -on furosemide, will increase to 40mg    3. PORSHA (acute kidney injury)  - Ambulatory referral/consult to Nephrology; Future  -Needs to establish with nephrology for outpt followup    4. Other ascites  -Check CMP with labs tomorrow  -May need hepatology followup    5. Cellulitis of lower extremity, unspecified laterality  - doxycycline (VIBRAMYCIN) 100 MG Cap; Take 1 capsule (100 mg total) by mouth 2 (two) times daily. for 7 days  Dispense: 14 capsule; Refill: 0       RTC/ER precautions given. F/U with PCP asap, preferably by end of next week.     Message to be sent to PCP staff to arrange followup and nephrology to establish care.     Counseled on regular exercise, maintenance of a healthy weight, balanced diet rich in fruits/vegetables and lean protein, and avoidance of unhealthy habits like smoking and excessive alcohol intake.    DOMINGO Aragon

## 2025-03-19 ENCOUNTER — LAB VISIT (OUTPATIENT)
Dept: LAB | Facility: HOSPITAL | Age: 60
End: 2025-03-19
Attending: NURSE PRACTITIONER
Payer: COMMERCIAL

## 2025-03-19 DIAGNOSIS — E11.69 DM TYPE 2 WITH DIABETIC MIXED HYPERLIPIDEMIA: ICD-10-CM

## 2025-03-19 DIAGNOSIS — R60.0 LOWER EXTREMITY EDEMA: ICD-10-CM

## 2025-03-19 DIAGNOSIS — Z00.00 WELLNESS EXAMINATION: ICD-10-CM

## 2025-03-19 DIAGNOSIS — E61.1 IRON DEFICIENCY: ICD-10-CM

## 2025-03-19 DIAGNOSIS — E03.4 HYPOTHYROIDISM DUE TO ACQUIRED ATROPHY OF THYROID: ICD-10-CM

## 2025-03-19 DIAGNOSIS — E78.2 DM TYPE 2 WITH DIABETIC MIXED HYPERLIPIDEMIA: ICD-10-CM

## 2025-03-19 DIAGNOSIS — R60.9 FLUID RETENTION: ICD-10-CM

## 2025-03-19 DIAGNOSIS — I50.9 HEART FAILURE, UNSPECIFIED HF CHRONICITY, UNSPECIFIED HEART FAILURE TYPE: ICD-10-CM

## 2025-03-19 LAB
ALBUMIN SERPL BCP-MCNC: 2.5 G/DL (ref 3.5–5.2)
ALBUMIN/CREAT UR: 94.1 UG/MG (ref 0–30)
ALP SERPL-CCNC: 154 U/L (ref 40–150)
ALT SERPL W/O P-5'-P-CCNC: 15 U/L (ref 10–44)
ANION GAP SERPL CALC-SCNC: 14 MMOL/L (ref 8–16)
AST SERPL-CCNC: 20 U/L (ref 10–40)
BILIRUB SERPL-MCNC: 1 MG/DL (ref 0.1–1)
BNP SERPL-MCNC: 1176 PG/ML (ref 0–99)
BUN SERPL-MCNC: 15 MG/DL (ref 6–20)
CALCIUM SERPL-MCNC: 8.7 MG/DL (ref 8.7–10.5)
CHLORIDE SERPL-SCNC: 98 MMOL/L (ref 95–110)
CHOLEST SERPL-MCNC: 143 MG/DL (ref 120–199)
CHOLEST/HDLC SERPL: 2.3 {RATIO} (ref 2–5)
CO2 SERPL-SCNC: 28 MMOL/L (ref 23–29)
CREAT SERPL-MCNC: 1.7 MG/DL (ref 0.5–1.4)
CREAT UR-MCNC: 34 MG/DL (ref 15–325)
ERYTHROCYTE [DISTWIDTH] IN BLOOD BY AUTOMATED COUNT: 19.4 % (ref 11.5–14.5)
EST. GFR  (NO RACE VARIABLE): 34.3 ML/MIN/1.73 M^2
FERRITIN SERPL-MCNC: 799 NG/ML (ref 20–300)
GLUCOSE SERPL-MCNC: 234 MG/DL (ref 70–110)
HCT VFR BLD AUTO: 35.3 % (ref 37–48.5)
HDLC SERPL-MCNC: 61 MG/DL (ref 40–75)
HDLC SERPL: 42.7 % (ref 20–50)
HGB BLD-MCNC: 10.5 G/DL (ref 12–16)
IRON SERPL-MCNC: 54 UG/DL (ref 30–160)
LDLC SERPL CALC-MCNC: 63 MG/DL (ref 63–159)
MCH RBC QN AUTO: 30.1 PG (ref 27–31)
MCHC RBC AUTO-ENTMCNC: 29.7 G/DL (ref 32–36)
MCV RBC AUTO: 101 FL (ref 82–98)
MICROALBUMIN UR DL<=1MG/L-MCNC: 32 UG/ML
NONHDLC SERPL-MCNC: 82 MG/DL
PLATELET # BLD AUTO: 357 K/UL (ref 150–450)
PMV BLD AUTO: 12 FL (ref 9.2–12.9)
POTASSIUM SERPL-SCNC: 3.2 MMOL/L (ref 3.5–5.1)
PROT SERPL-MCNC: 7.3 G/DL (ref 6–8.4)
RBC # BLD AUTO: 3.49 M/UL (ref 4–5.4)
SATURATED IRON: 18 % (ref 20–50)
SODIUM SERPL-SCNC: 140 MMOL/L (ref 136–145)
T4 FREE SERPL-MCNC: 0.94 NG/DL (ref 0.71–1.51)
TOTAL IRON BINDING CAPACITY: 305 UG/DL (ref 250–450)
TRANSFERRIN SERPL-MCNC: 206 MG/DL (ref 200–375)
TRIGL SERPL-MCNC: 95 MG/DL (ref 30–150)
TSH SERPL DL<=0.005 MIU/L-ACNC: 46.7 UIU/ML (ref 0.4–4)
WBC # BLD AUTO: 12.1 K/UL (ref 3.9–12.7)

## 2025-03-19 PROCEDURE — 82043 UR ALBUMIN QUANTITATIVE: CPT | Performed by: INTERNAL MEDICINE

## 2025-03-19 PROCEDURE — 80053 COMPREHEN METABOLIC PANEL: CPT | Performed by: NURSE PRACTITIONER

## 2025-03-19 PROCEDURE — 82728 ASSAY OF FERRITIN: CPT | Performed by: NURSE PRACTITIONER

## 2025-03-19 PROCEDURE — 83880 ASSAY OF NATRIURETIC PEPTIDE: CPT | Performed by: NURSE PRACTITIONER

## 2025-03-19 PROCEDURE — 85027 COMPLETE CBC AUTOMATED: CPT | Performed by: NURSE PRACTITIONER

## 2025-03-19 PROCEDURE — 84443 ASSAY THYROID STIM HORMONE: CPT | Performed by: NURSE PRACTITIONER

## 2025-03-19 PROCEDURE — 80061 LIPID PANEL: CPT | Performed by: NURSE PRACTITIONER

## 2025-03-19 PROCEDURE — 84439 ASSAY OF FREE THYROXINE: CPT | Performed by: NURSE PRACTITIONER

## 2025-03-19 PROCEDURE — 36415 COLL VENOUS BLD VENIPUNCTURE: CPT | Mod: PN | Performed by: NURSE PRACTITIONER

## 2025-03-19 PROCEDURE — 84466 ASSAY OF TRANSFERRIN: CPT | Performed by: NURSE PRACTITIONER

## 2025-03-24 ENCOUNTER — TELEPHONE (OUTPATIENT)
Dept: FAMILY MEDICINE | Facility: CLINIC | Age: 60
End: 2025-03-24

## 2025-03-24 DIAGNOSIS — E78.2 DM TYPE 2 WITH DIABETIC MIXED HYPERLIPIDEMIA: ICD-10-CM

## 2025-03-24 DIAGNOSIS — E11.69 DM TYPE 2 WITH DIABETIC MIXED HYPERLIPIDEMIA: ICD-10-CM

## 2025-03-24 NOTE — TELEPHONE ENCOUNTER
----- Message from Berenice sent at 3/24/2025  3:02 PM CDT -----  Regarding: Pharm auth  Contact: pharm  Type:  Pharmacy Calling to Clarify an RXName of Caller:pharmPharmacy Name:[id number] - [address] [address]Qtrbn: [phone] Fax: [phone] Name: FREESTYLE ZAY 3 PLUS SENSOR DeviWhat do they need to clarify?:needs PA

## 2025-03-25 ENCOUNTER — OFFICE VISIT (OUTPATIENT)
Dept: FAMILY MEDICINE | Facility: CLINIC | Age: 60
End: 2025-03-25
Payer: COMMERCIAL

## 2025-03-25 VITALS
HEIGHT: 63 IN | HEART RATE: 83 BPM | OXYGEN SATURATION: 95 % | WEIGHT: 190.94 LBS | BODY MASS INDEX: 33.83 KG/M2 | DIASTOLIC BLOOD PRESSURE: 70 MMHG | SYSTOLIC BLOOD PRESSURE: 140 MMHG

## 2025-03-25 DIAGNOSIS — E87.6 DIURETIC-INDUCED HYPOKALEMIA: ICD-10-CM

## 2025-03-25 DIAGNOSIS — R60.9 FLUID RETENTION: ICD-10-CM

## 2025-03-25 DIAGNOSIS — T50.2X5A DIURETIC-INDUCED HYPOKALEMIA: ICD-10-CM

## 2025-03-25 DIAGNOSIS — E11.69 DM TYPE 2 WITH DIABETIC MIXED HYPERLIPIDEMIA: ICD-10-CM

## 2025-03-25 DIAGNOSIS — I50.9 HEART FAILURE, UNSPECIFIED HF CHRONICITY, UNSPECIFIED HEART FAILURE TYPE: ICD-10-CM

## 2025-03-25 DIAGNOSIS — B33.24 VIRAL CARDIOMYOPATHY: ICD-10-CM

## 2025-03-25 DIAGNOSIS — E78.2 DM TYPE 2 WITH DIABETIC MIXED HYPERLIPIDEMIA: ICD-10-CM

## 2025-03-25 DIAGNOSIS — I50.20 HEART FAILURE WITH REDUCED EJECTION FRACTION: ICD-10-CM

## 2025-03-25 DIAGNOSIS — E03.9 HYPOTHYROIDISM, UNSPECIFIED TYPE: ICD-10-CM

## 2025-03-25 DIAGNOSIS — R79.89 ELEVATED BRAIN NATRIURETIC PEPTIDE (BNP) LEVEL: ICD-10-CM

## 2025-03-25 DIAGNOSIS — Z09 HOSPITAL DISCHARGE FOLLOW-UP: Primary | ICD-10-CM

## 2025-03-25 DIAGNOSIS — I25.2 STATUS POST NON-ST ELEVATION MYOCARDIAL INFARCTION (NSTEMI): ICD-10-CM

## 2025-03-25 PROCEDURE — 3077F SYST BP >= 140 MM HG: CPT | Mod: CPTII,S$GLB,, | Performed by: INTERNAL MEDICINE

## 2025-03-25 PROCEDURE — G2211 COMPLEX E/M VISIT ADD ON: HCPCS | Mod: S$GLB,,, | Performed by: INTERNAL MEDICINE

## 2025-03-25 PROCEDURE — 3051F HG A1C>EQUAL 7.0%<8.0%: CPT | Mod: CPTII,S$GLB,, | Performed by: INTERNAL MEDICINE

## 2025-03-25 PROCEDURE — 3066F NEPHROPATHY DOC TX: CPT | Mod: CPTII,S$GLB,, | Performed by: INTERNAL MEDICINE

## 2025-03-25 PROCEDURE — 3008F BODY MASS INDEX DOCD: CPT | Mod: CPTII,S$GLB,, | Performed by: INTERNAL MEDICINE

## 2025-03-25 PROCEDURE — 99999 PR PBB SHADOW E&M-EST. PATIENT-LVL V: CPT | Mod: PBBFAC,,, | Performed by: INTERNAL MEDICINE

## 2025-03-25 PROCEDURE — 3078F DIAST BP <80 MM HG: CPT | Mod: CPTII,S$GLB,, | Performed by: INTERNAL MEDICINE

## 2025-03-25 PROCEDURE — 3060F POS MICROALBUMINURIA REV: CPT | Mod: CPTII,S$GLB,, | Performed by: INTERNAL MEDICINE

## 2025-03-25 PROCEDURE — 1159F MED LIST DOCD IN RCRD: CPT | Mod: CPTII,S$GLB,, | Performed by: INTERNAL MEDICINE

## 2025-03-25 PROCEDURE — 99215 OFFICE O/P EST HI 40 MIN: CPT | Mod: S$GLB,,, | Performed by: INTERNAL MEDICINE

## 2025-03-25 RX ORDER — GLIMEPIRIDE 4 MG/1
1 TABLET ORAL EVERY MORNING
COMMUNITY

## 2025-03-25 RX ORDER — LANCETS
EACH MISCELLANEOUS
Qty: 100 EACH | Refills: 12 | Status: SHIPPED | OUTPATIENT
Start: 2025-03-25

## 2025-03-25 RX ORDER — INSULIN PUMP SYRINGE, 3 ML
EACH MISCELLANEOUS
Qty: 1 EACH | Refills: 0 | Status: SHIPPED | OUTPATIENT
Start: 2025-03-25

## 2025-03-25 RX ORDER — LEVOTHYROXINE SODIUM 100 UG/1
1 TABLET ORAL EVERY MORNING
COMMUNITY
Start: 2025-03-01 | End: 2025-05-30

## 2025-03-25 RX ORDER — POTASSIUM CHLORIDE 750 MG/1
10 TABLET, EXTENDED RELEASE ORAL DAILY
Qty: 30 TABLET | Refills: 1 | Status: SHIPPED | OUTPATIENT
Start: 2025-03-25

## 2025-03-25 RX ORDER — PRAVASTATIN SODIUM 10 MG/1
1 TABLET ORAL DAILY
COMMUNITY
Start: 2024-07-23

## 2025-03-25 RX ORDER — FUROSEMIDE 40 MG/1
TABLET ORAL
Qty: 45 TABLET | Refills: 1 | Status: SHIPPED | OUTPATIENT
Start: 2025-03-25

## 2025-03-25 NOTE — PROGRESS NOTES
Subjective:       Patient ID: Domi Leary is a 60 y.o. female.    Chief Complaint: Follow-up (Blood tests /Fluid in lower extremities/Pt wants to test sugar ), Heartburn (Pt experiencing heart burn over the last few days /Pt now taking PRN medication everyday ), and Medication Refill   Admit date: 2/5/2025    Discharge date: 2/28/25    Admitting Physician: Margarito Chicas MD     Discharge Physician: Roselyn Valdivia MD     Admission Diagnoses: Acute liver failure [K72.00]    Discharge Diagnoses: Resolved multiorgan failure     Admission Condition: critical    Discharged Condition: fair    Indication for Admission: Multiorgan failure     Hospital Course: Domi Leary is a 59 y.o. female with PMH rheumatoid DM2, HLD, seropositive rheumatoid arthritis, Felty syndrome with splenectomy, hypothyroidism who presented to the [facility] ED initially on 2/5/2025 for 4-day history of flu-like symptoms, including fever, SOB, productive cough, vomiting, and an inability tolerate solid food. She was admitted to [facility]U on 2/5/25 for sepsis with multiorgan failure. Echo demonstrated new onset HFrEF with severe LV dysfunction (EF of 10-15%), requiring right heart cath on 2/6 to assess hemodynamic status, determined to be mixed distributive/cardiogenic shock. The patient was also noted to have worsening renal function, with Cr elevation from baseline of 0.7. Patient was transferred to CCU on 2/7, started on dobutamine initially but switched to amiodarone after developing SVT. Renal function continued to worsen requiring HD on 2/11, 2/12, 2/14. Patient hospital stay was complicated after a spontaneous retroperitoneal bleeding, anterior branch of left internal illiac with unknown source by IR in 2/11. Hgb decrease from 9 to 7 overnight of 2/10-2/11, given 3 units pRBC on 2/11 and 2 units on 2/12. Bilateral US consistent with nonocclusive thrombus in the mid right superficial femoral vein, and nonocclusive thrombus in the  left peroneal vein in the calf.    Patient was stepped down to the floor from CCU on 2/14. After reviewing risks and benefits patient decided for an IVC filter vs undergoing heparin re-challenged. Patient IVC filter was placed on 2/19. Patient then converted back on sustained SVT that was not controlled with metoprolol, amiodarone, and pushes of adenosine. Patient underwent cardioversion 2/27 converting back to sinus rhythm. As part of the work-up for SVT, she underwent a V/Q on 2/24 showing multiple segmental defects in lower lobes of lung bilaterally. Based on shared decisions between patient and cardiology, patient will continue to hold heparin gtt for now.     In addition, when patient initially presented to the hospital, she was found in iatrogenic hyperthyroidism. Patient was on home dose synthroid 200 mg. She was then discontinued, and started at a lower dose Synthroid 200 mg daily on 2/16. Inpatient endocrinology recommending re-check of labs within 6 -8 weeks after re-starting Synthroid.     Patient was stable for discharge on 2/28. Patient did not meet criteria for SNF since she was able to walk more than 250 ft. When was discharge home with PT/OT follow-up. Patient should have follow-up appointment for IV filter removal after 3 months of placement, and ongoing discussion of anticoagulation.            History of Present Illness            Rheumatology meds on hold since hospital admission.   Now taking all meds as prescribed    Weight at discharge from hospital 180.   Having lower extremity swelling seen PA last week started on Lasix doxycycline completes today    Still having lower extremity swelling.  Some response to Lasix 40 but not resolved.    Most likely complicated with 1300 mg of sodium bicarb 3 times daily.  Most recent labs bicarb normal  BNP still elevated.    Off Synjardy Ozempic diabetes.  Levothyroxine was lowered at hospital discharge 100.  TSH still elevated.  Before hospitalization  patient was on Synthroid 200 restart 200 check TSH in 5 weeks    ==================  Previous note information below    Gyn: Dr Sanchez menopause   MM/21 discussed patient defer  Dexa: never / recommended Rx placed  Colonoscopy:  O 3/23 r/c 5 yr   Immunizations: Flu: D Tdap:   due next visit Prevnar 13: 2016 Shingles:  Recommend (needs updated vaccines) a splenium  Smoker:  Former  Derm: Dr Ryne PARRA psoriasis  Eye: O   Prev PCP Dr Nicole  Previous noncompliance with labs and visits.           Type 2 diabetes:     Does not check sugar A1c  7.4  /   previous Ozempic 1 mg, Glimepiride 4 q.d., Synjardy 1 q.d.  Prev A1c >14  // .  (SE: Metformin ER 1000 diarrhea. )     Hypothyroid:  Previous dose  Rx Levo 200     Mixed HLD:  LDL goal < 70// controlled  Rx Pravastatin 10     Elevated liver enzymes: Cyclic  resolved.   Chronic low albumin: Increase protein in diet.       Rheumatoid arthritis: stable // holding Plaquenil 200, Leflunomide 20   Mgmt Rheum Dr Chakraborty               Off previous biologic     Iron Def:  Without anemia.  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    Prev mgmt Heme  Dr Calle      Metabolic syndrome BMI 33 & 187, 31 & 176, 32/181-184       ____________________________________________________________________________________________________  Assessment & Plan:  1. Hospital discharge follow-up    2. Status post non-ST elevation myocardial infarction (NSTEMI)    3. Fluid retention  - furosemide (LASIX) 40 MG tablet; 1 tab po AM daily  & 1/2  tab po noon x 3 days (prn swelling)  Dispense: 45 tablet; Refill: 1  - BNP; Future    4. Viral cardiomyopathy  - furosemide (LASIX) 40 MG tablet; 1 tab po AM daily  & 1/2  tab po noon x 3 days (prn swelling)  Dispense: 45 tablet; Refill: 1  - Comprehensive Metabolic Panel; Future  - BNP; Future    5. Heart failure with reduced ejection fraction  - empagliflozin  (JARDIANCE) 25 mg tablet; Take 1 tablet (25 mg total) by mouth once daily.  Dispense: 30 tablet; Refill: 3    6. Hypothyroidism, unspecified type  - T4, Free; Future  - TSH; Future    7. Elevated brain natriuretic peptide (BNP) level  - BNP; Future    8. Diuretic-induced hypokalemia  - potassium chloride (KLOR-CON) 10 MEQ TbSR; Take 1 tablet (10 mEq total) by mouth once daily.  Dispense: 30 tablet; Refill: 1    9. DM type 2 with diabetic mixed hyperlipidemia  - empagliflozin (JARDIANCE) 25 mg tablet; Take 1 tablet (25 mg total) by mouth once daily.  Dispense: 30 tablet; Refill: 3  - blood-glucose meter kit; To check BG 1 times daily, to use with insurance preferred meter  Dispense: 1 each; Refill: 0  - lancets Misc; To check BG 1 times daily, to use with insurance preferred meter  Dispense: 100 each; Refill: 12  - blood sugar diagnostic Strp; To check BG 1 times daily, to use with insurance preferred meter  Dispense: 100 strip; Refill: 12    10. Heart failure, unspecified HF chronicity, unspecified heart failure type  - Comprehensive Metabolic Panel; Future     Hospital discharge follow-up    Status post non-ST elevation myocardial infarction (NSTEMI)  Comments:  2025    Fluid retention  Comments:  Cut down dose of sodium bicarb.  1 tablet 3 times daily  Recheck labs next week  Orders:  -     furosemide (LASIX) 40 MG tablet; 1 tab po AM daily  & 1/2  tab po noon x 3 days (prn swelling)  Dispense: 45 tablet; Refill: 1  -     BNP; Future; Expected date: 03/25/2025    Viral cardiomyopathy  -     furosemide (LASIX) 40 MG tablet; 1 tab po AM daily  & 1/2  tab po noon x 3 days (prn swelling)  Dispense: 45 tablet; Refill: 1  -     Comprehensive Metabolic Panel; Future; Expected date: 03/25/2025  -     BNP; Future; Expected date: 03/25/2025    Heart failure with reduced ejection fraction  -     empagliflozin (JARDIANCE) 25 mg tablet; Take 1 tablet (25 mg total) by mouth once daily.  Dispense: 30 tablet; Refill:  3    Hypothyroidism, unspecified type  Comments:  change back to 200 daily  Orders:  -     Cancel: TSH; Future; Expected date: 03/25/2025  -     Cancel: T4, Free; Future; Expected date: 03/25/2025  -     T4, Free; Future; Expected date: 04/25/2025  -     TSH; Future; Expected date: 04/25/2025    Elevated brain natriuretic peptide (BNP) level  -     BNP; Future; Expected date: 03/25/2025    Diuretic-induced hypokalemia  -     potassium chloride (KLOR-CON) 10 MEQ TbSR; Take 1 tablet (10 mEq total) by mouth once daily.  Dispense: 30 tablet; Refill: 1    DM type 2 with diabetic mixed hyperlipidemia  -     empagliflozin (JARDIANCE) 25 mg tablet; Take 1 tablet (25 mg total) by mouth once daily.  Dispense: 30 tablet; Refill: 3  -     blood-glucose meter kit; To check BG 1 times daily, to use with insurance preferred meter  Dispense: 1 each; Refill: 0  -     lancets Misc; To check BG 1 times daily, to use with insurance preferred meter  Dispense: 100 each; Refill: 12  -     blood sugar diagnostic Strp; To check BG 1 times daily, to use with insurance preferred meter  Dispense: 100 strip; Refill: 12    Heart failure, unspecified HF chronicity, unspecified heart failure type  -     Comprehensive Metabolic Panel; Future; Expected date: 03/25/2025        Continue to work on maintain healthy weight, balanced diet. Avoid unhealthy habits: smoking/vaping, excessive alcohol intake.     Recommend diet exercise:  High protein, low fat, low carb diet - calories women under <1200 & men <1800, carbs <100 G, protein 50-60 G daily. Protein supplements to replace one meal.  Keep all beverages < 10 calories per serving. Keep snacks < 100 calories.   Recommend exercise 160 minutes per week, combo of cardio and weight/strength training.     Visit today included increased complexity associated with the care of the episodic problem addressed and managing the longitudinal care of the patient due to the serious and/or complex managed problem(s).  HTN      Disclaimer: This note was partly generated using dictation software which may occasionally result in transcription errors  ____________________________________________________________________________________________________  Review of Systems:  Review of Systems    Leg swelling     Objective:     Wt Readings from Last 3 Encounters:   03/25/25 86.6 kg (190 lb 14.7 oz)   03/18/25 91.4 kg (201 lb 8 oz)   03/13/25 90.7 kg (199 lb 15.3 oz)     BP Readings from Last 3 Encounters:   03/25/25 (!) 140/70   03/18/25 120/60   03/13/25 126/76       Lab Results   Component Value Date    WBC 12.10 03/19/2025    HGB 10.5 (L) 03/19/2025    HCT 35.3 (L) 03/19/2025     03/19/2025     03/19/2025    K 3.2 (L) 03/19/2025    CL 98 03/19/2025    ALT 15 03/19/2025    AST 20 03/19/2025    CO2 28 03/19/2025    CREATININE 1.7 (H) 03/19/2025    BUN 15 03/19/2025     (H) 03/19/2025      Hemoglobin A1C   Date Value Ref Range Status   02/06/2025 7.9 (H) 4.7 - 5.6 % Final   08/02/2024 7.7 (H) 4.0 - 5.6 % Final     Comment:     ADA Screening Guidelines:  5.7-6.4%  Consistent with prediabetes  >or=6.5%  Consistent with diabetes    High levels of fetal hemoglobin interfere with the HbA1C  assay. Heterozygous hemoglobin variants (HbS, HgC, etc)do  not significantly interfere with this assay.   However, presence of multiple variants may affect accuracy.     02/06/2024 7.4 (H) 4.0 - 5.6 % Final     Comment:     ADA Screening Guidelines:  5.7-6.4%  Consistent with prediabetes  >or=6.5%  Consistent with diabetes    High levels of fetal hemoglobin interfere with the HbA1C  assay. Heterozygous hemoglobin variants (HbS, HgC, etc)do  not significantly interfere with this assay.   However, presence of multiple variants may affect accuracy.     05/08/2023 7.8 (H) 4.0 - 5.6 % Final     Comment:     ADA Screening Guidelines:  5.7-6.4%  Consistent with prediabetes  >or=6.5%  Consistent with diabetes    High levels of fetal hemoglobin  interfere with the HbA1C  assay. Heterozygous hemoglobin variants (HbS, HgC, etc)do  not significantly interfere with this assay.   However, presence of multiple variants may affect accuracy.        Lab Results   Component Value Date    TSH 46.696 (H) 03/19/2025    TSH 14.62 (H) 02/25/2025    TSH 8.36 (H) 02/20/2025     Lab Results   Component Value Date    FREET4 0.94 03/19/2025    FREET4 0.86 08/02/2024    FREET4 1.02 02/06/2024     Lab Results   Component Value Date    LDLCALC 63.0 03/19/2025    LDLCALC 32 02/05/2025    LDLCALC 59.4 (L) 08/02/2024     Lab Results   Component Value Date    TRIG 95 03/19/2025    TRIG 102 02/05/2025    TRIG 198 (H) 08/02/2024            Physical Exam  Constitutional:       Appearance: Normal appearance.      Comments: Here with family member   HENT:      Head: Normocephalic and atraumatic.   Eyes:      Extraocular Movements: Extraocular movements intact.      Pupils: Pupils are equal, round, and reactive to light.   Cardiovascular:      Rate and Rhythm: Normal rate and regular rhythm.   Pulmonary:      Effort: Pulmonary effort is normal.      Breath sounds: Normal breath sounds.   Abdominal:      General: Bowel sounds are normal.   Musculoskeletal:      Right lower leg: Edema present.      Left lower leg: Edema present.      Comments: 2+ edema to bilateral knee, venous stasis changes   Neurological:      Mental Status: She is alert and oriented to person, place, and time.   Psychiatric:         Mood and Affect: Mood normal.               Medication List with Changes/Refills   New Medications    BLOOD SUGAR DIAGNOSTIC STRP    To check BG 1 times daily, to use with insurance preferred meter    BLOOD-GLUCOSE METER KIT    To check BG 1 times daily, to use with insurance preferred meter    EMPAGLIFLOZIN (JARDIANCE) 25 MG TABLET    Take 1 tablet (25 mg total) by mouth once daily.    LANCETS MISC    To check BG 1 times daily, to use with insurance preferred meter    POTASSIUM CHLORIDE  (KLOR-CON) 10 MEQ TBSR    Take 1 tablet (10 mEq total) by mouth once daily.   Current Medications    AMIODARONE (PACERONE) 400 MG TABLET    Take 400 mg by mouth 2 (two) times daily.    CETIRIZINE (ZYRTEC) 10 MG TABLET    Take 1 tablet (10 mg total) by mouth every evening.    FERROUS SULFATE (FEOSOL) 325 MG (65 MG IRON) TAB TABLET    TAKE 1 TABLET BY MOUTH BEFORE EVENING MEAL.    GLIMEPIRIDE (AMARYL) 4 MG TABLET    Take 4 mg by mouth daily with breakfast.    GLIMEPIRIDE (AMARYL) 4 MG TABLET    Take 1 tablet by mouth every morning.    HYDRALAZINE (APRESOLINE) 25 MG TABLET    Take 25 mg by mouth every 8 (eight) hours.    HYDROXYCHLOROQUINE (PLAQUENIL) 200 MG TABLET    TAKE 1 TABLET BY MOUTH TWICE A DAY    ISOSORBIDE MONONITRATE (IMDUR) 30 MG 24 HR TABLET    Take 30 mg by mouth.    LEFLUNOMIDE (ARAVA) 20 MG TAB    Take 1 tablet (20 mg total) by mouth once daily.    LEVOTHYROXINE (SYNTHROID) 100 MCG TABLET    Take 1 tablet by mouth every morning.    LEVOTHYROXINE (SYNTHROID) 200 MCG TABLET    Take 1 tablet (200 mcg total) by mouth before breakfast.    METOPROLOL SUCCINATE (TOPROL-XL) 200 MG 24 HR TABLET    Take 200 mg by mouth.    MULTIVITAMIN WITH FOLIC ACID 400 MCG TAB    Take 1 tablet by mouth once daily.    PANTOPRAZOLE (PROTONIX) 40 MG TABLET    Take 40 mg by mouth daily as needed.    PRAVASTATIN (PRAVACHOL) 10 MG TABLET    TAKE 1 TABLET BY MOUTH EVERY DAY    PRAVASTATIN (PRAVACHOL) 10 MG TABLET    Take 1 tablet by mouth once daily.    SEMAGLUTIDE (OZEMPIC) 1 MG/DOSE (4 MG/3 ML)    INJECT 1 MG UNDER THE SKIN ONE TIME PER WEEK (keep upcoming appt w PCP)    SODIUM BICARBONATE 650 MG TABLET    Take 1,300 mg by mouth 3 (three) times daily.    TRAMADOL (ULTRAM) 50 MG TABLET    Take 1 tablet (50 mg total) by mouth every 12 (twelve) hours as needed for Pain (rheumatoid arthritis).    TRIAMCINOLONE ACETONIDE 0.1% (KENALOG) 0.1 % OINTMENT    Apply topically 2 (two) times daily.   Changed and/or Refilled Medications     Modified Medication Previous Medication    FUROSEMIDE (LASIX) 40 MG TABLET furosemide (LASIX) 20 MG tablet       1 tab po AM daily  & 1/2  tab po noon x 3 days (prn swelling)    Take 1 tablet (20 mg total) by mouth once daily.   Discontinued Medications    EMPAGLIFLOZIN-METFORMIN (SYNJARDY XR) 12.5-1,000 MG TBPH    Take 2 tablets by mouth once daily.    FLASH GLUCOSE SCANNING READER (Inst[employer]ourSTYLE ZAY 14 DAY READER) MISC    1 Units by Misc.(Non-Drug; Combo Route) route every 14 (fourteen) days.    FREESTYLE ZAY 3 PLUS SENSOR VALENTIN    USE TO MONITOR GLUCOSE. CHANGE EVERY 15 DAYS

## 2025-03-26 PROBLEM — T50.2X5A DIURETIC-INDUCED HYPOKALEMIA: Status: ACTIVE | Noted: 2025-03-26

## 2025-03-26 PROBLEM — E87.6 DIURETIC-INDUCED HYPOKALEMIA: Status: ACTIVE | Noted: 2025-03-26

## 2025-03-27 ENCOUNTER — OFFICE VISIT (OUTPATIENT)
Dept: NEPHROLOGY | Facility: CLINIC | Age: 60
End: 2025-03-27
Payer: COMMERCIAL

## 2025-03-27 VITALS
OXYGEN SATURATION: 96 % | HEIGHT: 63 IN | HEART RATE: 73 BPM | DIASTOLIC BLOOD PRESSURE: 68 MMHG | WEIGHT: 190.94 LBS | SYSTOLIC BLOOD PRESSURE: 118 MMHG | BODY MASS INDEX: 33.83 KG/M2

## 2025-03-27 DIAGNOSIS — I10 ESSENTIAL HYPERTENSION: ICD-10-CM

## 2025-03-27 DIAGNOSIS — M05.79 SEROPOSITIVE RHEUMATOID ARTHRITIS OF MULTIPLE SITES: ICD-10-CM

## 2025-03-27 DIAGNOSIS — N17.9 AKI (ACUTE KIDNEY INJURY): Primary | ICD-10-CM

## 2025-03-27 DIAGNOSIS — E03.4 HYPOTHYROIDISM DUE TO ACQUIRED ATROPHY OF THYROID: ICD-10-CM

## 2025-03-27 DIAGNOSIS — Z90.81 ASPLENIA AFTER SURGICAL PROCEDURE: ICD-10-CM

## 2025-03-27 DIAGNOSIS — E66.9 OBESITY (BMI 30-39.9): ICD-10-CM

## 2025-03-27 DIAGNOSIS — I50.42 CHRONIC COMBINED SYSTOLIC AND DIASTOLIC CONGESTIVE HEART FAILURE: ICD-10-CM

## 2025-03-27 DIAGNOSIS — E11.40 TYPE 2 DIABETES MELLITUS WITH DIABETIC NEUROPATHY, WITHOUT LONG-TERM CURRENT USE OF INSULIN: ICD-10-CM

## 2025-03-27 PROCEDURE — 3078F DIAST BP <80 MM HG: CPT | Mod: CPTII,S$GLB,, | Performed by: STUDENT IN AN ORGANIZED HEALTH CARE EDUCATION/TRAINING PROGRAM

## 2025-03-27 PROCEDURE — 3060F POS MICROALBUMINURIA REV: CPT | Mod: CPTII,S$GLB,, | Performed by: STUDENT IN AN ORGANIZED HEALTH CARE EDUCATION/TRAINING PROGRAM

## 2025-03-27 PROCEDURE — 3074F SYST BP LT 130 MM HG: CPT | Mod: CPTII,S$GLB,, | Performed by: STUDENT IN AN ORGANIZED HEALTH CARE EDUCATION/TRAINING PROGRAM

## 2025-03-27 PROCEDURE — 1159F MED LIST DOCD IN RCRD: CPT | Mod: CPTII,S$GLB,, | Performed by: STUDENT IN AN ORGANIZED HEALTH CARE EDUCATION/TRAINING PROGRAM

## 2025-03-27 PROCEDURE — 99204 OFFICE O/P NEW MOD 45 MIN: CPT | Mod: S$GLB,,, | Performed by: STUDENT IN AN ORGANIZED HEALTH CARE EDUCATION/TRAINING PROGRAM

## 2025-03-27 PROCEDURE — 3066F NEPHROPATHY DOC TX: CPT | Mod: CPTII,S$GLB,, | Performed by: STUDENT IN AN ORGANIZED HEALTH CARE EDUCATION/TRAINING PROGRAM

## 2025-03-27 PROCEDURE — 1160F RVW MEDS BY RX/DR IN RCRD: CPT | Mod: CPTII,S$GLB,, | Performed by: STUDENT IN AN ORGANIZED HEALTH CARE EDUCATION/TRAINING PROGRAM

## 2025-03-27 PROCEDURE — 3008F BODY MASS INDEX DOCD: CPT | Mod: CPTII,S$GLB,, | Performed by: STUDENT IN AN ORGANIZED HEALTH CARE EDUCATION/TRAINING PROGRAM

## 2025-03-27 PROCEDURE — 99999 PR PBB SHADOW E&M-EST. PATIENT-LVL IV: CPT | Mod: PBBFAC,,, | Performed by: STUDENT IN AN ORGANIZED HEALTH CARE EDUCATION/TRAINING PROGRAM

## 2025-03-27 PROCEDURE — 3051F HG A1C>EQUAL 7.0%<8.0%: CPT | Mod: CPTII,S$GLB,, | Performed by: STUDENT IN AN ORGANIZED HEALTH CARE EDUCATION/TRAINING PROGRAM

## 2025-03-27 RX ORDER — SPIRONOLACTONE 25 MG/1
12.5 TABLET ORAL DAILY
Qty: 15 TABLET | Refills: 11 | Status: SHIPPED | OUTPATIENT
Start: 2025-03-27 | End: 2026-03-27

## 2025-03-27 NOTE — PROGRESS NOTES
"[facility]  Nephrology Clinic      Subjective:       HPI ID: Domi Leary is a 60 y.o. female who presents for new patient evaluation for chronic renal failure.    Domi Leary is referred by Rosales Boles MD to be evaluated for chronic renal failure.  She is pertinent medical history of seropositive rheumatoid arthritis, diabetes mellitus type 2, hyperlipidemia, Felty syndrome with splenectomy, hypothyroidism.  Recently admitted to [facility] on hospital for sepsis with multiorgan failure with prolonged hospital stay for the month of February.  Attached is the discharge summary: "presented to the [facility] ED initially on 2/5/2025 for 4-day history of flu-like symptoms, including fever, SOB, productive cough, vomiting, and an inability tolerate solid food. She was admitted to [facility] MICU on 2/5/25 for sepsis with multiorgan failure. Echo demonstrated new onset HFrEF with severe LV dysfunction (EF of 10-15%), requiring right heart cath on 2/6 to assess hemodynamic status, determined to be mixed distributive/cardiogenic shock. The patient was also noted to have worsening renal function, with Cr elevation from baseline of 0.7. Patient was transferred to CCU on 2/7, started on dobutamine initially but switched to amiodarone after developing SVT. Renal function continued to worsen requiring HD on 2/11, 2/12, 2/14. [HD line was removed on 02/22/2025] Patient hospital stay was complicated after  a spontaneous retroperitoneal bleeding, anterior branch of left internal illiac with unknown source by IR in 2/11. Hgb decrease from 9 to 7 overnight of 2/10-2/11, given 3 units pRBC on 2/11 and 2 units on 2/12. Bilateral US consistent with nonocclusive thrombus in the mid right superficial femoral vein, and nonocclusive thrombus in the left peroneal vein in the calf.     Patient was stepped down to the floor from CCU on 2/14. After reviewing risks and benefits patient decided for an IVC filter " "vs undergoing heparin re-challenged. Patient IVC filter was placed on 2/19. Patient then converted back on sustained SVT that was not controlled with metoprolol, amiodarone, and pushes of adenosine. Patient underwent cardioversion 2/27 converting back to sinus rhythm. As part of the work-up for SVT, she underwent a V/Q on 2/24 showing multiple segmental defects in lower lobes of lung bilaterally. Based on shared decisions between patient and  cardiology, patient will continue to hold heparin gtt for now.      In addition, when patient initially presented to the hospital, she was found in iatrogenic hyperthyroidism. Patient was on home dose synthroid 200 mg. She was then discontinued, and started at a lower dose Synthroid 200 mg daily on 2/16. Inpatient endocrinology recommending re-check of labs within 6 -8 weeks after re-starting Synthroid.      Patient was stable for discharge on 2/28. Patient did not meet criteria for SNF since she was able to walk more than 250 ft. When was discharge home with PT/OT follow-up. Patient should have follow-up appointment for IV filter removal after 3 months of placement, and ongoing discussion of anticoagulation."    In terms of her renal history, she had the above PORSHA req iHD. Denies known history of nephrolithiasis or hematuria episodes. No reported history of frequent or recurrent use of NSAIDs/COXI. She has a history of seropositive RA- currently not on DMARD. Denies any pertinent family history of known kidney disease, or family members with ESRD requiring dialysis.  Other pertinent Uro/gyn history: denies  Smoking history: quit 18 years ago.  Fluid intake in 24hrs: 1.5L fluid restriction.  Cardiology: Dr. Ibarra    Interval history:  3/27/25 -   She has no uremic or urinary symptoms and is in her usual state of health.    During this visit, the patient and I reviewed her lab trends, discussed CKD epidemiology and risk factors, as well as general lifestyle and risk factor " modifications to reduce her risk of progression to ESRD.   Recently saw PCP; bicarb dose reduced; started on SGLT2-I  On hydralazine/imdur for GDMT of CHF  PORSHA improving from hospitalization still; discussed it may settle out vs continue to improve from prior ATN, time will tell.  Lasix recently increased from 40mg QD to 40mg QD plus 20mg in afternoon  Potassium tablet added.         Review of Systems   Constitutional:  Negative for chills, diaphoresis and fever.   Respiratory:  Negative for cough and shortness of breath.    Cardiovascular:  Negative for chest pain and leg swelling.   Gastrointestinal:  Negative for abdominal pain, diarrhea, nausea and vomiting.   Genitourinary:  Negative for difficulty urinating, dysuria and hematuria.   Musculoskeletal:  Negative for myalgias.   Neurological:  Negative for headaches.   Hematological:  Does not bruise/bleed easily.       The past medical, family and social histories were reviewed for this encounter.     Past Medical History:   Diagnosis Date    Diabetes 2012    Hypothyroid 2012    Rheumatoid arthritis 2012     Past Surgical History:   Procedure Laterality Date     SECTION      1    COLONOSCOPY N/A 3/23/2023    Procedure: COLONOSCOPY;  Surgeon: Rinku Olmstead MD;  Location: [facility];  Service: Endoscopy;  Laterality: N/A;    COSMETIC SURGERY      breast augmentation and removal    HERNIA REPAIR      left LUQ incision    SPLENECTOMY, TOTAL      age 43 // main O    TONSILLECTOMY       Social History[1]  Current Outpatient Medications   Medication Sig    amiodarone (PACERONE) 400 MG tablet Take 400 mg by mouth 2 (two) times daily.    blood sugar diagnostic Strp To check BG 1 times daily, to use with insurance preferred meter    blood-glucose meter kit To check BG 1 times daily, to use with insurance preferred meter    cetirizine (ZYRTEC) 10 MG tablet Take 1 tablet (10 mg total) by mouth every evening.    empagliflozin (JARDIANCE) 25 mg tablet  Take 1 tablet (25 mg total) by mouth once daily.    furosemide (LASIX) 40 MG tablet 1 tab po AM daily  & 1/2  tab po noon x 3 days (prn swelling)    hydrALAZINE (APRESOLINE) 25 MG tablet Take 25 mg by mouth every 8 (eight) hours.    isosorbide mononitrate (IMDUR) 30 MG 24 hr tablet Take 30 mg by mouth.    lancets Misc To check BG 1 times daily, to use with insurance preferred meter    levothyroxine (SYNTHROID) 200 MCG tablet Take 1 tablet (200 mcg total) by mouth before breakfast.    metoprolol succinate (TOPROL-XL) 200 MG 24 hr tablet Take 200 mg by mouth.    multivitamin with folic acid 400 mcg Tab Take 1 tablet by mouth once daily.    pantoprazole (PROTONIX) 40 MG tablet Take 40 mg by mouth daily as needed.    potassium chloride (KLOR-CON) 10 MEQ TbSR Take 1 tablet (10 mEq total) by mouth once daily.    pravastatin (PRAVACHOL) 10 MG tablet TAKE 1 TABLET BY MOUTH EVERY DAY    pravastatin (PRAVACHOL) 10 MG tablet Take 1 tablet by mouth once daily.    sodium bicarbonate 650 MG tablet Take 1,300 mg by mouth 3 (three) times daily.    traMADoL (ULTRAM) 50 mg tablet Take 1 tablet (50 mg total) by mouth every 12 (twelve) hours as needed for Pain (rheumatoid arthritis).    triamcinolone acetonide 0.1% (KENALOG) 0.1 % ointment Apply topically 2 (two) times daily.    ferrous sulfate (FEOSOL) 325 mg (65 mg iron) Tab tablet TAKE 1 TABLET BY MOUTH BEFORE EVENING MEAL. (Patient not taking: Reported on 3/27/2025)    glimepiride (AMARYL) 4 MG tablet Take 4 mg by mouth daily with breakfast. (Patient not taking: Reported on 3/27/2025)    glimepiride (AMARYL) 4 MG tablet Take 1 tablet by mouth every morning. (Patient not taking: Reported on 3/27/2025)    hydroxychloroquine (PLAQUENIL) 200 mg tablet TAKE 1 TABLET BY MOUTH TWICE A DAY (Patient not taking: Reported on 3/27/2025)    leflunomide (ARAVA) 20 MG Tab Take 1 tablet (20 mg total) by mouth once daily. (Patient not taking: Reported on 3/27/2025)    levothyroxine (SYNTHROID) 100  "MCG tablet Take 1 tablet by mouth every morning. (Patient not taking: Reported on 3/27/2025)    semaglutide (OZEMPIC) 1 mg/dose (4 mg/3 mL) INJECT 1 MG UNDER THE SKIN ONE TIME PER WEEK (keep upcoming appt w PCP) (Patient not taking: Reported on 3/27/2025)     No current facility-administered medications for this visit.         Objective:   /68 (BP Location: Right arm, Patient Position: Sitting)   Pulse 73   Ht 5' 3" (1.6 m)   Wt 86.6 kg (190 lb 14.7 oz)   SpO2 96%   BMI 33.82 kg/m²      Physical Exam  Vitals reviewed.   Constitutional:       General: She is not in acute distress.     Appearance: Normal appearance.   HENT:      Head: Normocephalic and atraumatic.   Eyes:      General: No scleral icterus.     Extraocular Movements: Extraocular movements intact.   Cardiovascular:      Rate and Rhythm: Normal rate and regular rhythm.      Pulses: Normal pulses.      Heart sounds: Normal heart sounds.      No friction rub.   Pulmonary:      Effort: Pulmonary effort is normal. No respiratory distress.      Breath sounds: Normal breath sounds.   Abdominal:      General: Abdomen is flat.      Palpations: Abdomen is soft.   Musculoskeletal:         General: Swelling present.      Cervical back: Normal range of motion. No tenderness.      Right lower leg: Edema present.      Left lower leg: Edema present.   Neurological:      General: No focal deficit present.      Mental Status: She is oriented to person, place, and time.      Motor: No weakness.   Psychiatric:         Mood and Affect: Mood normal.         Behavior: Behavior normal.         Assessment:     Lab Results   Component Value Date    CREATININE 1.7 (H) 03/19/2025    BUN 15 03/19/2025     03/19/2025    K 3.2 (L) 03/19/2025    CL 98 03/19/2025    CO2 28 03/19/2025     Lab Results   Component Value Date    CALCIUM 8.7 03/19/2025     Lab Results   Component Value Date    HCT 35.3 (L) 03/19/2025     Prot/Creat Ratio, Urine   Date Value Ref Range Status "   10/24/2014 0.1 0.0 - 0.2 Final   04/01/2014 Unable to calculate 0.0 - 0.2 Final   06/20/2013 UNABLE TO CALCULATE 0.0 - 0.2 Final       Lab Results   Component Value Date    MICALBCREAT 94.1 (H) 03/19/2025    MICALBCREAT Unable to calculate 02/06/2024    MICALBCREAT 14.8 10/29/2022    MICALBCREAT Unable to calculate 05/06/2021    MICALBCREAT 15.1 11/21/2018    MICALBCREAT Unable to calculate 08/14/2018 02/05/2025 RPE U.S.-right kidney 12 cm, left kidney 13.4 cm      1. Hypothyroidism due to acquired atrophy of thyroid    2. PORSHA (acute kidney injury)    3. Seropositive rheumatoid arthritis of multiple sites    4. Type 2 diabetes mellitus with diabetic neuropathy, without long-term current use of insulin    5. Asplenia after surgical procedure    6. Obesity (BMI 30-39.9)    7. Essential hypertension    8. Chronic combined systolic and diastolic congestive heart failure        Plan:   Return to clinic in 6 weeks  Labs for next visit include upcr, uacr, pth, rfp, cbc, ua, mag.  Baseline creatinine is 0.7mg/dL prior to prolonged hospital stay, now TBD.    PORSHA / ATN plan:  -noted during recent prolonged hospitalization in February of 2025, briefly required hemodialysis for decompensated renal failure x3 sessions during hospital stay.  Dialysis catheter removed on 02/22/2025.    -good baseline renal function based on historical data trends prior to hospital event.  -agree with the addition of SGLT2 inhibitor   -keep off RAAS inhibition for a little bit longer as renal function may continue to improve.    -send repeat urine/urine proteinuria assessments  -continue discussion and adjustment of modifiable risk factors. Educated patient on the importance of BP, glycemic and lipid control   -avoid dehydration; currently on fluid restriction of 1.5L per cardiology    -would benefit from low dose MRA as method of additional diuresis, potassium calming stasis, and goal-directed medical therapy of CHF with reduced ejection  fraction     -has upcoming follow up rheumatology, to discuss resumption of DMARD therapy    -Has new provider visit with Cardiology upcoming.  Anticipate need for repeat echocardiogram and possible need for ICD placement.  From a CHF perspective, she appears compensated at this time.  Has a residual edema but is improving with diuretic strategy    -BP trends reviewed.  Medication list reviewed.  Continue current therapy plans at this time.    __________________________  Wayne Barrientos MD  [facility] [place]    Part of this note has been created using [employer] dictation system. Errors in transcription may not be completely avoided.           [1]   Social History  Socioeconomic History    Marital status:    Tobacco Use    Smoking status: Former     Current packs/day: 0.00     Types: Cigarettes     Quit date: 2009     Years since quittin.1    Smokeless tobacco: Never   Substance and Sexual Activity    Alcohol use: Yes     Comment: occassionally    Drug use: No    Sexual activity: Yes     Social Drivers of Health     Financial Resource Strain: Low Risk  (2025)    Received from [facility]    Overall Financial Resource Strain (CARDIA)     Difficulty of Paying Living Expenses: Not hard at all   Food Insecurity: No Food Insecurity (2025)    Received from [facility]    Hunger Vital Sign     Worried About Running Out of Food in the Last Year: Never true     Ran Out of Food in the Last Year: Never true   Transportation Needs: No Transportation Needs (2025)    Received from [facility] [employer]    PRAPARE - Transportation     Lack of Transportation (Medical): No     Lack of Transportation (Non-Medical): No   Physical Activity: Inactive (2025)    Received from [facility] [employer]    Exercise Vital Sign     Days of Exercise per Week: 0 days     Minutes of Exercise per Session: 0 min   Stress: No Stress Concern Present (2025)    Received from [facility] [employer] [facility] [employer]  Health - Occupational Stress Questionnaire     Feeling of Stress : Not at all   Housing Stability: Low Risk  (2/5/2025)    Received from [facility]    Housing Stability Vital Sign     Unable to Pay for Housing in the Last Year: No     Number of Times Moved in the Last Year: 0     Homeless in the Last Year: No

## 2025-03-31 ENCOUNTER — LAB VISIT (OUTPATIENT)
Dept: LAB | Facility: HOSPITAL | Age: 60
End: 2025-03-31
Payer: COMMERCIAL

## 2025-03-31 ENCOUNTER — OFFICE VISIT (OUTPATIENT)
Dept: RHEUMATOLOGY | Facility: CLINIC | Age: 60
End: 2025-03-31
Payer: COMMERCIAL

## 2025-03-31 VITALS
SYSTOLIC BLOOD PRESSURE: 140 MMHG | BODY MASS INDEX: 32.07 KG/M2 | HEIGHT: 63 IN | WEIGHT: 181 LBS | DIASTOLIC BLOOD PRESSURE: 75 MMHG | HEART RATE: 88 BPM

## 2025-03-31 DIAGNOSIS — Z79.899 HIGH RISK MEDICATIONS (NOT ANTICOAGULANTS) LONG-TERM USE: ICD-10-CM

## 2025-03-31 DIAGNOSIS — R79.89 ELEVATED BRAIN NATRIURETIC PEPTIDE (BNP) LEVEL: ICD-10-CM

## 2025-03-31 DIAGNOSIS — D84.9 IMMUNOSUPPRESSION: ICD-10-CM

## 2025-03-31 DIAGNOSIS — I50.9 HEART FAILURE, UNSPECIFIED HF CHRONICITY, UNSPECIFIED HEART FAILURE TYPE: ICD-10-CM

## 2025-03-31 DIAGNOSIS — M05.79 SEROPOSITIVE RHEUMATOID ARTHRITIS OF MULTIPLE SITES: Primary | ICD-10-CM

## 2025-03-31 DIAGNOSIS — Z90.81 ASPLENIA AFTER SURGICAL PROCEDURE: ICD-10-CM

## 2025-03-31 DIAGNOSIS — R60.9 FLUID RETENTION: ICD-10-CM

## 2025-03-31 DIAGNOSIS — E03.9 HYPOTHYROIDISM, UNSPECIFIED TYPE: ICD-10-CM

## 2025-03-31 DIAGNOSIS — B33.24 VIRAL CARDIOMYOPATHY: ICD-10-CM

## 2025-03-31 LAB
ALBUMIN SERPL BCP-MCNC: 2.6 G/DL (ref 3.5–5.2)
ALP SERPL-CCNC: 137 UNIT/L (ref 40–150)
ALT SERPL W/O P-5'-P-CCNC: 12 UNIT/L (ref 10–44)
ANION GAP (OHS): 8 MMOL/L (ref 8–16)
AST SERPL-CCNC: 20 UNIT/L (ref 11–45)
BILIRUB SERPL-MCNC: 0.7 MG/DL (ref 0.1–1)
BNP SERPL-MCNC: 509 PG/ML (ref 0–99)
BUN SERPL-MCNC: 25 MG/DL (ref 6–20)
CALCIUM SERPL-MCNC: 8.1 MG/DL (ref 8.7–10.5)
CHLORIDE SERPL-SCNC: 95 MMOL/L (ref 95–110)
CO2 SERPL-SCNC: 33 MMOL/L (ref 23–29)
CREAT SERPL-MCNC: 1.7 MG/DL (ref 0.5–1.4)
GFR SERPLBLD CREATININE-BSD FMLA CKD-EPI: 34 ML/MIN/1.73/M2
GLUCOSE SERPL-MCNC: 392 MG/DL (ref 70–110)
POTASSIUM SERPL-SCNC: 3 MMOL/L (ref 3.5–5.1)
PROT SERPL-MCNC: 7.2 GM/DL (ref 6–8.4)
SODIUM SERPL-SCNC: 136 MMOL/L (ref 136–145)

## 2025-03-31 PROCEDURE — 3051F HG A1C>EQUAL 7.0%<8.0%: CPT | Mod: CPTII,S$GLB,, | Performed by: INTERNAL MEDICINE

## 2025-03-31 PROCEDURE — 3060F POS MICROALBUMINURIA REV: CPT | Mod: CPTII,S$GLB,, | Performed by: INTERNAL MEDICINE

## 2025-03-31 PROCEDURE — 1159F MED LIST DOCD IN RCRD: CPT | Mod: CPTII,S$GLB,, | Performed by: INTERNAL MEDICINE

## 2025-03-31 PROCEDURE — 83880 ASSAY OF NATRIURETIC PEPTIDE: CPT

## 2025-03-31 PROCEDURE — 36415 COLL VENOUS BLD VENIPUNCTURE: CPT | Mod: PN

## 2025-03-31 PROCEDURE — 3066F NEPHROPATHY DOC TX: CPT | Mod: CPTII,S$GLB,, | Performed by: INTERNAL MEDICINE

## 2025-03-31 PROCEDURE — 3008F BODY MASS INDEX DOCD: CPT | Mod: CPTII,S$GLB,, | Performed by: INTERNAL MEDICINE

## 2025-03-31 PROCEDURE — 80053 COMPREHEN METABOLIC PANEL: CPT

## 2025-03-31 PROCEDURE — 99215 OFFICE O/P EST HI 40 MIN: CPT | Mod: S$GLB,,, | Performed by: INTERNAL MEDICINE

## 2025-03-31 PROCEDURE — 3078F DIAST BP <80 MM HG: CPT | Mod: CPTII,S$GLB,, | Performed by: INTERNAL MEDICINE

## 2025-03-31 PROCEDURE — 3077F SYST BP >= 140 MM HG: CPT | Mod: CPTII,S$GLB,, | Performed by: INTERNAL MEDICINE

## 2025-03-31 PROCEDURE — 99999 PR PBB SHADOW E&M-EST. PATIENT-LVL IV: CPT | Mod: PBBFAC,,, | Performed by: INTERNAL MEDICINE

## 2025-03-31 ASSESSMENT — ROUTINE ASSESSMENT OF PATIENT INDEX DATA (RAPID3)
PATIENT GLOBAL ASSESSMENT SCORE: 2
TOTAL RAPID3 SCORE: 3.33
MDHAQ FUNCTION SCORE: 1.2
PSYCHOLOGICAL DISTRESS SCORE: 0
FATIGUE SCORE: 1.1
PAIN SCORE: 4

## 2025-03-31 NOTE — PROGRESS NOTES
Subjective:          Chief Complaint: Domi Leary is a 60 y.o. female who had concerns including Disease Management.    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  Unfortunately by 2009 her arthritis flaring significantly    Doing well overall still with stiffness and loss of ROM at the right 2nd MCP but continues to be active.   Psoriasis on hand is back, only other episode with Enbrel in past. Using OTC hydrocortisone.     3/2025: patient is OFF al DMARD therapy since:   Was hospitalized February of 2025 influenza A.  Pt. Presented w shortness of breath productive cough vomiting inability tolerate food she was in the ICU for multiorgan dysfunction and sepsis.  Markedly elevated liver enzymes, iatrogenic hyperthyroidism.  She an EF of 10-15% found to be cardiogenic.  Her renal function significantly worsened.  She received dialysis x3 days, patient had a retroperitoneal bleed.  She had an IVC filter placed on 02/19 due to a right superficial thrombus.  V/Q scan did show multiple sub segmental defects and lower lobes of the lungs bilaterally. EF upon DC not normalized last ECHO 15-20%, renal function    Patient is DMARDs were discontinued including hydroxychloroquine and leflunomide given her status and have continued.   Joints today are fine, mild aches but no wrist or MCP swelling.     Currently on HOLD:   LFA 20mg daily  HCQ 200mg BID (overdue for eye exam)     AM stiffness: <30 min   Worst for her is at night sitting still  Some dry eye, no dry mouth  Previous medications    Enbrel: diffuse psoriasis after starting Enbrel. Resolved with holding.       Component      Latest Ref Rng & Units 1/31/2018 9/4/2009   PIA      Neg <1:160  Negative   CCP  Antibodies      <5.0 U/mL  637.7   CRP      0.0 - 8.2 mg/L 12.8 (H) 32.2 (H)   Sed Rate      0 - 20 mm/Hr 12 23 (H)   Rheumatoid Factor      0 - 15 IU/ml  157 (H)     REVIEW OF SYSTEMS:    Review of Systems   Constitutional:  Negative for fever, malaise/fatigue and weight loss.   HENT:  Negative for sore throat.    Eyes:  Negative for double vision, photophobia and redness.   Respiratory:  Negative for cough, shortness of breath and wheezing.    Cardiovascular:  Negative for chest pain, palpitations and orthopnea.   Gastrointestinal:  Negative for abdominal pain, constipation and diarrhea.   Genitourinary:  Negative for dysuria, hematuria and urgency.   Musculoskeletal:  Positive for joint pain. Negative for back pain and myalgias.   Skin:  Negative for rash.   Neurological:  Negative for dizziness, tingling, focal weakness and headaches.   Endo/Heme/Allergies:  Does not bruise/bleed easily.   Psychiatric/Behavioral:  Negative for depression, hallucinations and suicidal ideas.                Objective:            Past Medical History:   Diagnosis Date    Diabetes 2012    Hypothyroid 2012    Rheumatoid arthritis 2012     Family History   Problem Relation Name Age of Onset    No Known Problems Mother      Ulcerative colitis Father      Cataracts Paternal Grandmother      Diabetes Maternal Aunt      Breast cancer Maternal Aunt      Glaucoma Neg Hx      Macular degeneration Neg Hx      Retinal detachment Neg Hx       Social History     Tobacco Use    Smoking status: Former     Current packs/day: 0.00     Types: Cigarettes     Quit date: 2009     Years since quittin.1    Smokeless tobacco: Never   Substance Use Topics    Alcohol use: Yes     Comment: occassionally    Drug use: No         Current Outpatient Medications on File Prior to Visit   Medication Sig Dispense Refill    amiodarone (PACERONE) 400 MG tablet Take 400 mg by mouth 2 (two) times daily.      cetirizine (ZYRTEC) 10 MG tablet Take 1  tablet (10 mg total) by mouth every evening. 30 tablet 0    furosemide (LASIX) 40 MG tablet 1 tab po AM daily  & 1/2  tab po noon x 3 days (prn swelling) 45 tablet 1    hydrALAZINE (APRESOLINE) 25 MG tablet Take 25 mg by mouth every 8 (eight) hours.      isosorbide mononitrate (IMDUR) 30 MG 24 hr tablet Take 30 mg by mouth.      levothyroxine (SYNTHROID) 200 MCG tablet Take 1 tablet (200 mcg total) by mouth before breakfast. 90 tablet 2    metoprolol succinate (TOPROL-XL) 200 MG 24 hr tablet Take 200 mg by mouth.      multivitamin with folic acid 400 mcg Tab Take 1 tablet by mouth once daily.      pantoprazole (PROTONIX) 40 MG tablet Take 40 mg by mouth daily as needed.      potassium chloride (KLOR-CON) 10 MEQ TbSR Take 1 tablet (10 mEq total) by mouth once daily. 30 tablet 1    pravastatin (PRAVACHOL) 10 MG tablet TAKE 1 TABLET BY MOUTH EVERY DAY 90 tablet 2    sodium bicarbonate 650 MG tablet Take 1,300 mg by mouth 3 (three) times daily.      spironolactone (ALDACTONE) 25 MG tablet Take 0.5 tablets (12.5 mg total) by mouth once daily. 15 tablet 11    traMADoL (ULTRAM) 50 mg tablet Take 1 tablet (50 mg total) by mouth every 12 (twelve) hours as needed for Pain (rheumatoid arthritis). 60 tablet 3    blood sugar diagnostic Strp To check BG 1 times daily, to use with insurance preferred meter (Patient not taking: Reported on 3/31/2025) 100 strip 12    blood-glucose meter kit To check BG 1 times daily, to use with insurance preferred meter (Patient not taking: Reported on 3/31/2025) 1 each 0    empagliflozin (JARDIANCE) 25 mg tablet Take 1 tablet (25 mg total) by mouth once daily. (Patient not taking: Reported on 3/31/2025) 30 tablet 3    ferrous sulfate (FEOSOL) 325 mg (65 mg iron) Tab tablet TAKE 1 TABLET BY MOUTH BEFORE EVENING MEAL. (Patient not taking: Reported on 3/31/2025) 90 tablet 0    glimepiride (AMARYL) 4 MG tablet Take 4 mg by mouth daily with breakfast. (Patient not taking: Reported on 3/25/2025)       glimepiride (AMARYL) 4 MG tablet Take 1 tablet by mouth every morning. (Patient not taking: Reported on 3/25/2025)      hydroxychloroquine (PLAQUENIL) 200 mg tablet TAKE 1 TABLET BY MOUTH TWICE A DAY (Patient not taking: Reported on 3/13/2025) 180 tablet 2    lancets Misc To check BG 1 times daily, to use with insurance preferred meter (Patient not taking: Reported on 3/31/2025) 100 each 12    leflunomide (ARAVA) 20 MG Tab Take 1 tablet (20 mg total) by mouth once daily. (Patient not taking: Reported on 3/13/2025) 90 tablet 1    levothyroxine (SYNTHROID) 100 MCG tablet Take 1 tablet by mouth every morning. (Patient not taking: Reported on 3/31/2025)      pravastatin (PRAVACHOL) 10 MG tablet Take 1 tablet by mouth once daily.      semaglutide (OZEMPIC) 1 mg/dose (4 mg/3 mL) INJECT 1 MG UNDER THE SKIN ONE TIME PER WEEK (keep upcoming appt w PCP) (Patient not taking: Reported on 3/13/2025) 9 mL 1    triamcinolone acetonide 0.1% (KENALOG) 0.1 % ointment Apply topically 2 (two) times daily. (Patient not taking: Reported on 3/31/2025) 453 g 0     No current facility-administered medications on file prior to visit.       Vitals:    03/31/25 0834   BP: (!) 140/75   Pulse: 88       Physical Exam:    Physical Exam  Constitutional:       Appearance: She is well-developed.   HENT:      Head: Normocephalic and atraumatic.   Eyes:      Pupils: Pupils are equal, round, and reactive to light.   Cardiovascular:      Rate and Rhythm: Normal rate and regular rhythm.      Heart sounds: Normal heart sounds.   Pulmonary:      Effort: Pulmonary effort is normal.      Breath sounds: Normal breath sounds.   Musculoskeletal:      Right shoulder: No swelling or tenderness. Normal range of motion.      Left shoulder: No swelling or tenderness. Normal range of motion.      Right elbow: No swelling. Normal range of motion. No tenderness.      Left elbow: No swelling. Normal range of motion. No tenderness.      Right wrist: No swelling or  tenderness. Normal range of motion.      Left wrist: No swelling or tenderness. Normal range of motion.      Right hand: Swelling and tenderness present. Normal range of motion.      Left hand: Swelling and tenderness present. Normal range of motion.      Cervical back: Normal range of motion.      Right knee: No swelling. Normal range of motion. No tenderness.      Left knee: No swelling. Normal range of motion. No tenderness.      Right foot: Normal range of motion. No swelling or tenderness.      Left foot: Normal range of motion. No swelling or tenderness.      Comments: Right 1MCP no synovitis   Left 5th MCP synovitis interdigital space. Mild TTP.   Early swan neck deformity.     + finkelstein's right wrist.      Skin:     General: Skin is warm and dry.   Neurological:      Mental Status: She is alert and oriented to person, place, and time.   Psychiatric:         Behavior: Behavior normal.     Component      Latest Ref Rng & Units 8/8/2018   Sed Rate      0 - 20 mm/Hr 15   CRP      0.0 - 8.2 mg/L 10.0 (H)   Rheumatoid Factor      0.0 - 15.0 IU/mL 85.0 (H)   CCP Antibodies      <5.0 U/mL 646.5 (H)           Assessment:       Encounter Diagnoses   Name Primary?    Seropositive rheumatoid arthritis of multiple sites Yes    Immunosuppression     High risk medications (not anticoagulants) long-term use            Plan:        Seropositive rheumatoid arthritis of multiple sites    Immunosuppression    High risk medications (not anticoagulants) long-term use    Asplenia after surgical procedure          1-4 Very pleasant 58 y/o female with hx of sero+ RA and felty's s/p splenectomy   -I agree to continue to hold leflunomide and HCQ during her recovery.    -reviewed LFA for immunosuppression.    -reviewed HCQ for updated ECHO and arrhythmias.    -she will call if any flare and we will monitor her closely, may use low dose prednisone until fully recovered.   Reviewed all hospital records, consults and labs during  recent hospitalization.     F/u with me in 8 weeks. Call office for any RA flare.     No follow-ups on file.         60min consultation with greater than 50% of that time included Preparing to see the patient (review records, tests), Obtaining and/or reviewing separately obtained historical data, Performing a medically appropriate examination and/or evaluation , Ordering medications, tests, and/or procedures, Referring and communicating with other healthcare professionals , Documenting clinical information in the electronic or other health record and Independently interpreting results  (as warranted) & communicating results to the patient/family/caregiver. All questions answered.

## 2025-04-01 ENCOUNTER — TELEPHONE (OUTPATIENT)
Dept: FAMILY MEDICINE | Facility: CLINIC | Age: 60
End: 2025-04-01
Payer: COMMERCIAL

## 2025-04-01 DIAGNOSIS — E11.69 DM TYPE 2 WITH DIABETIC MIXED HYPERLIPIDEMIA: Primary | ICD-10-CM

## 2025-04-01 DIAGNOSIS — E78.2 DM TYPE 2 WITH DIABETIC MIXED HYPERLIPIDEMIA: Primary | ICD-10-CM

## 2025-04-01 NOTE — TELEPHONE ENCOUNTER
Pt stated his blood sugar is 461, pt stated there not having any symptoms of high blood sugar. Please advise.

## 2025-04-01 NOTE — TELEPHONE ENCOUNTER
----- Message from Renetta sent at 4/1/2025  4:17 PM CDT -----  Type:  Needs [facility] AdviceWho [facility]HS[facility] (please be specific): pt blood sugar 461 How long has patient had these symptoms:  unsureWould the patient rather a call back or a response via [facility]? Please callBest Call Back Number: [phone] Information: pt is not having any symptoms of high blood sugar   Please call back to advise. Thanks!

## 2025-04-02 ENCOUNTER — TELEPHONE (OUTPATIENT)
Dept: FAMILY MEDICINE | Facility: CLINIC | Age: 60
End: 2025-04-02
Payer: COMMERCIAL

## 2025-04-02 ENCOUNTER — OFFICE VISIT (OUTPATIENT)
Dept: CARDIOLOGY | Facility: CLINIC | Age: 60
End: 2025-04-02
Payer: COMMERCIAL

## 2025-04-02 VITALS
OXYGEN SATURATION: 96 % | HEIGHT: 63 IN | SYSTOLIC BLOOD PRESSURE: 116 MMHG | HEART RATE: 65 BPM | DIASTOLIC BLOOD PRESSURE: 76 MMHG | WEIGHT: 181 LBS | BODY MASS INDEX: 32.07 KG/M2

## 2025-04-02 DIAGNOSIS — I42.8 NON-ISCHEMIC CARDIOMYOPATHY: ICD-10-CM

## 2025-04-02 DIAGNOSIS — I82.4Y9 DEEP VEIN THROMBOSIS (DVT) OF PROXIMAL LOWER EXTREMITY, UNSPECIFIED CHRONICITY, UNSPECIFIED LATERALITY: ICD-10-CM

## 2025-04-02 DIAGNOSIS — N18.32 STAGE 3B CHRONIC KIDNEY DISEASE: ICD-10-CM

## 2025-04-02 DIAGNOSIS — I48.92 ATRIAL FLUTTER, UNSPECIFIED TYPE: ICD-10-CM

## 2025-04-02 DIAGNOSIS — R58 RETROPERITONEAL HEMORRHAGE: ICD-10-CM

## 2025-04-02 DIAGNOSIS — I50.20 HFREF (HEART FAILURE WITH REDUCED EJECTION FRACTION): Primary | ICD-10-CM

## 2025-04-02 DIAGNOSIS — E03.9 HYPOTHYROIDISM, UNSPECIFIED TYPE: ICD-10-CM

## 2025-04-02 DIAGNOSIS — Z95.828 S/P IVC FILTER: ICD-10-CM

## 2025-04-02 PROCEDURE — 1159F MED LIST DOCD IN RCRD: CPT | Mod: CPTII,S$GLB,, | Performed by: INTERNAL MEDICINE

## 2025-04-02 PROCEDURE — 99999 PR PBB SHADOW E&M-EST. PATIENT-LVL V: CPT | Mod: PBBFAC,,, | Performed by: INTERNAL MEDICINE

## 2025-04-02 PROCEDURE — 1160F RVW MEDS BY RX/DR IN RCRD: CPT | Mod: CPTII,S$GLB,, | Performed by: INTERNAL MEDICINE

## 2025-04-02 PROCEDURE — 3051F HG A1C>EQUAL 7.0%<8.0%: CPT | Mod: CPTII,S$GLB,, | Performed by: INTERNAL MEDICINE

## 2025-04-02 PROCEDURE — 3074F SYST BP LT 130 MM HG: CPT | Mod: CPTII,S$GLB,, | Performed by: INTERNAL MEDICINE

## 2025-04-02 PROCEDURE — 99204 OFFICE O/P NEW MOD 45 MIN: CPT | Mod: S$GLB,,, | Performed by: INTERNAL MEDICINE

## 2025-04-02 PROCEDURE — 3060F POS MICROALBUMINURIA REV: CPT | Mod: CPTII,S$GLB,, | Performed by: INTERNAL MEDICINE

## 2025-04-02 PROCEDURE — 3078F DIAST BP <80 MM HG: CPT | Mod: CPTII,S$GLB,, | Performed by: INTERNAL MEDICINE

## 2025-04-02 PROCEDURE — 3008F BODY MASS INDEX DOCD: CPT | Mod: CPTII,S$GLB,, | Performed by: INTERNAL MEDICINE

## 2025-04-02 PROCEDURE — 3066F NEPHROPATHY DOC TX: CPT | Mod: CPTII,S$GLB,, | Performed by: INTERNAL MEDICINE

## 2025-04-02 RX ORDER — LOSARTAN POTASSIUM 25 MG/1
25 TABLET ORAL DAILY
Qty: 90 TABLET | Refills: 3 | Status: SHIPPED | OUTPATIENT
Start: 2025-04-02 | End: 2026-04-02

## 2025-04-02 RX ORDER — SEMAGLUTIDE 0.68 MG/ML
INJECTION, SOLUTION SUBCUTANEOUS
Qty: 3 ML | Refills: 4 | Status: SHIPPED | OUTPATIENT
Start: 2025-04-02

## 2025-04-02 RX ORDER — INSULIN ASPART 100 [IU]/ML
5 INJECTION, SOLUTION INTRAVENOUS; SUBCUTANEOUS
Qty: 1 EACH | Refills: 1 | Status: SHIPPED | OUTPATIENT
Start: 2025-04-02 | End: 2026-04-02

## 2025-04-02 NOTE — TELEPHONE ENCOUNTER
Check and make sure she is taking a new medication Jardiance 25.    Starting doses of Ozempic    And will give her immediate acting insulin to use with sliding scale when her sugars greater than 200.    Sliding scale will be on prescription with insulin

## 2025-04-02 NOTE — TELEPHONE ENCOUNTER
Spoke with patient and stated that she hasn't gotten Jardiance because of not approved yet. She has stopped taken the Ozempic and her sugars have been around 200 still.

## 2025-04-02 NOTE — PROGRESS NOTES
[facility] [place]  [place]    Subjective:     Patient ID:  Domi Leary is a 60 y.o. female patient here for evaluation Hospital Follow Up (Patient was in [facility] H 2025 - 2025 for arrhythmia . )      HPI:  60-year-old female here to establish care.  Recently admitted in February to [facility].  Found to have heart failure with severely reduced EF with a cardiac output of around 2 L/min on right heart catheterization.  Reports angiogram without any blockages.  Unknown etiology of the heart failure.  Also had SVT and atrial flutter status post cardioversion there.  Prior history of heart failure, no family history of heart failure.  Does not smoke or drink, no drugs.  Since discharge doing well, does have stable significant pedal edema and she takes 60 mg of Lasix every day, she is waiting on approval of Jardiance.  TSH has worsened on amiodarone, levothyroxine recently increased by primary.    Review of Systems   All other systems reviewed and are negative.       Past Medical History:   Diagnosis Date    Diabetes 2012    Hypothyroid 2012    Rheumatoid arthritis 2012       Past Surgical History:   Procedure Laterality Date     SECTION      1    COLONOSCOPY N/A 3/23/2023    Procedure: COLONOSCOPY;  Surgeon: Rinku Olmstead MD;  Location: [facility];  Service: Endoscopy;  Laterality: N/A;    COSMETIC SURGERY      breast augmentation and removal    HERNIA REPAIR      left LUQ incision    SPLENECTOMY, TOTAL      age 43 // main O    TONSILLECTOMY         Family History   Problem Relation Name Age of Onset    No Known Problems Mother      Ulcerative colitis Father      Cataracts Paternal Grandmother      Diabetes Maternal Aunt      Breast cancer Maternal Aunt      Glaucoma Neg Hx      Macular degeneration Neg Hx      Retinal detachment Neg Hx         Social History     Socioeconomic History    Marital  status:    Tobacco Use    Smoking status: Former     Current packs/day: 0.00     Types: Cigarettes     Quit date: 2009     Years since quittin.1    Smokeless tobacco: Never   Substance and Sexual Activity    Alcohol use: Yes     Comment: occassionally    Drug use: No    Sexual activity: Yes     Social Drivers of Health     Financial Resource Strain: Low Risk  (2025)    Received from [facility]    Overall Financial Resource Strain (CARDIA)     Difficulty of Paying Living Expenses: Not hard at all   Food Insecurity: No Food Insecurity (2025)    Received from [facility]    Hunger Vital Sign     Worried About Running Out of Food in the Last Year: Never true     Ran Out of Food in the Last Year: Never true   Transportation Needs: No Transportation Needs (2025)    Received from [facility]    PRAPARE - Transportation     Lack of Transportation (Medical): No     Lack of Transportation (Non-Medical): No   Physical Activity: Inactive (2025)    Received from [facility]    Exercise Vital Sign     Days of Exercise per Week: 0 days     Minutes of Exercise per Session: 0 min   Stress: No Stress Concern Present (2025)    Received from [facility]    [employer] - Occupational Stress Questionnaire     Feeling of Stress : Not at all   Housing Stability: Low Risk  (2025)    Received from [facility]    Housing Stability Vital Sign     Unable to Pay for Housing in the Last Year: No     Number of Times Moved in the Last Year: 0     Homeless in the Last Year: No       Current Medications[1]    Review of patient's allergies indicates:   Allergen Reactions    Enbrel [etanercept] Rash     psoriasis    Amoxil [amoxicillin] Hives         Objective:        Vitals:    25 0813   BP: 116/76   Pulse: 65       Physical Exam  Vitals reviewed.   Constitutional:       Appearance: Normal appearance.   Cardiovascular:      Rate and Rhythm: Normal rate and regular rhythm.       Pulses: Normal pulses.      Heart sounds: Normal heart sounds. No murmur heard.     No gallop.   Pulmonary:      Effort: Pulmonary effort is normal.      Breath sounds: Normal breath sounds.   Musculoskeletal:      Right lower leg: Edema present.      Left lower leg: Edema present.   Skin:     General: Skin is warm.   Neurological:      General: No focal deficit present.      Mental Status: She is alert and oriented to person, place, and time.         LIPIDS - LAST 2   Lab Results   Component Value Date    CHOL 143 03/19/2025    CHOL 83 02/05/2025    HDL 61 03/19/2025    HDL 31 (L) 02/05/2025    LDLCALC 63.0 03/19/2025    LDLCALC 32 02/05/2025    TRIG 95 03/19/2025    TRIG 102 02/05/2025    CHOLHDL 42.7 03/19/2025    CHOLHDL 2.68 02/05/2025       CBC - LAST 2  Lab Results   Component Value Date    WBC 12.10 03/19/2025    WBC 9.0 02/28/2025    RBC 3.49 (L) 03/19/2025    RBC 5.03 10/25/2024    HGB 10.5 (L) 03/19/2025    HGB 9.3 (L) 02/28/2025    HCT 35.3 (L) 03/19/2025    HCT 28.7 (L) 02/28/2025     (H) 03/19/2025    MCV 94.2 02/28/2025    MCH 30.1 03/19/2025    MCH 30.4 02/28/2025    MCHC 29.7 (L) 03/19/2025    MCHC 32.3 02/28/2025    RDW 19.4 (H) 03/19/2025    RDW 20.8 (H) 02/28/2025     03/19/2025     10/25/2024    MPV 12.0 03/19/2025    MPV 9.8 02/28/2025    GRAN 3.9 05/13/2024    GRAN 39.6 05/13/2024    LYMPH 3,754 10/25/2024    LYMPH 36.8 10/25/2024    MONO 928 10/25/2024    MONO 9.1 10/25/2024    BASO 143 10/25/2024    BASO 0.16 05/13/2024    NRBC 0 05/13/2024    NRBC 0 12/14/2023       CHEMISTRY & LIVER FUNCTION - LAST 2  Lab Results   Component Value Date     03/31/2025     03/19/2025    K 3.0 (L) 03/31/2025    K 3.2 (L) 03/19/2025    CL 95 03/31/2025    CL 98 03/19/2025    CO2 33 (H) 03/31/2025    CO2 28 03/19/2025    ANIONGAP 8 03/31/2025    ANIONGAP 14 03/19/2025    BUN 25 (H) 03/31/2025    BUN 15 03/19/2025    CREATININE 1.7 (H) 03/31/2025    CREATININE 1.7 (H) 03/19/2025      (H) 03/19/2025     (H) 05/13/2024    CALCIUM 8.1 (L) 03/31/2025    CALCIUM 8.7 03/19/2025    PH 5.0 02/20/2025    PH 5.5 02/05/2025    ALBUMIN 2.6 (L) 03/31/2025    ALBUMIN 2.5 (L) 03/19/2025    PROT 7.3 03/19/2025    PROT 7.2 05/13/2024    ALKPHOS 137 03/31/2025    ALKPHOS 154 (H) 03/19/2025    ALT 12 03/31/2025    ALT 15 03/19/2025    AST 20 03/31/2025    AST 20 03/19/2025    BILITOT 0.7 03/31/2025    BILITOT 1.0 03/19/2025        CARDIAC PROFILE - LAST 2  Lab Results   Component Value Date     (H) 03/31/2025    BNP 1,176 (H) 03/19/2025    CPK 1,569 (H) 02/06/2025    CPK 2,225 (H) 02/06/2025     06/23/2011     04/28/2010        COAGULATION - LAST 2  Lab Results   Component Value Date    INR 1.2 02/14/2025    INR 1.3 (H) 02/13/2025    APTT 34.9 (H) 02/05/2025    APTT 35.3 (H) 02/05/2025       ENDOCRINE & PSA - LAST 2  Lab Results   Component Value Date    HGBA1C 7.9 (H) 02/06/2025    HGBA1C 7.7 (H) 08/02/2024    TSH 46.696 (H) 03/19/2025    TSH 14.62 (H) 02/25/2025        ECHOCARDIOGRAM RESULTS  No results found for this or any previous visit.      CURRENT/PREVIOUS VISIT EKG  No results found for this or any previous visit.  No valid procedures specified.   No results found for this or any previous visit.    No valid procedures specified.        Assessment:       1. HFrEF (heart failure with reduced ejection fraction)    2. Atrial flutter, unspecified type    3. Hypothyroidism, unspecified type    4. Non-ischemic cardiomyopathy    5. Deep vein thrombosis (DVT) of proximal lower extremity, unspecified chronicity, unspecified laterality    6. S/P IVC filter    7. Retroperitoneal hemorrhage    8. Stage 3b chronic kidney disease           Plan:       HFrEF (heart failure with reduced ejection fraction)  -     Ambulatory referral/consult to Cardiology  -     IN OFFICE EKG 12-LEAD (to [place])  -     Ambulatory referral/consult to Electrophysiology; Future; Expected date: 04/09/2025  -      Cardiac Monitor - 3-15 Day Adult ([place] Only); Future  -     Basic metabolic panel; Future; Expected date: 04/02/2025  -     BNP; Future; Expected date: 04/02/2025  -     losartan (COZAAR) 25 MG tablet; Take 1 tablet (25 mg total) by mouth once daily.  Dispense: 90 tablet; Refill: 3    Atrial flutter, unspecified type  -     Ambulatory referral/consult to Cardiology  -     IN OFFICE EKG 12-LEAD (to [place])  -     Ambulatory referral/consult to Electrophysiology; Future; Expected date: 04/09/2025  -     Cardiac Monitor - 3-15 Day Adult ([place] Only); Future    Hypothyroidism, unspecified type    Non-ischemic cardiomyopathy    Deep vein thrombosis (DVT) of proximal lower extremity, unspecified chronicity, unspecified laterality    S/P IVC filter    Retroperitoneal hemorrhage    Stage 3b chronic kidney disease      Continue with metoprolol 200 mg q.day. continue the amiodarone for now, patient will get a follow-up with electrophysiology next week for further evaluation, she is not on anticoagulation given history of retroperitoneal hemorrhage.    Will transition form hydralazine and Imdur to losartan given improvement in creatinine with close monitoring of renal function on Monday.  If renal function worsens, will tell her to decrease the dose, if renal function is stable, will call her and if her blood pressure is stable, will consider increasing it more and rechecking renal function prior to next appointment.    Continue with spironolactone and Lasix.  Start Jardiance when approved.    If renal function improves with heart failure therapy, will consider referral to advanced heart failure Clinic.    Informed patient that we will recheck her echo in a few months once she is on goal-directed medical therapy for heart failure to re-evaluate her EF and to consider ICD if needed.    Follow up in about 2 weeks (around 4/16/2025) for f/u HFrEF.          Rudy Ibarra MD  [facility]  [place]       [1]   Current Outpatient Medications   Medication Sig Dispense Refill    amiodarone (PACERONE) 400 MG tablet Take 200 mg by mouth once daily.      blood sugar diagnostic Strp To check BG 1 times daily, to use with insurance preferred meter 100 strip 12    blood-glucose meter kit To check BG 1 times daily, to use with insurance preferred meter 1 each 0    cetirizine (ZYRTEC) 10 MG tablet Take 1 tablet (10 mg total) by mouth every evening. 30 tablet 0    furosemide (LASIX) 40 MG tablet 1 tab po AM daily  & 1/2  tab po noon x 3 days (prn swelling) (Patient taking differently: 1 tab po AM daily  & 1/2  tab po noon .) 45 tablet 1    lancets Misc To check BG 1 times daily, to use with insurance preferred meter 100 each 12    levothyroxine (SYNTHROID) 200 MCG tablet Take 1 tablet (200 mcg total) by mouth before breakfast. 90 tablet 2    metoprolol succinate (TOPROL-XL) 200 MG 24 hr tablet Take 200 mg by mouth.      multivitamin with folic acid 400 mcg Tab Take 1 tablet by mouth once daily.      pantoprazole (PROTONIX) 40 MG tablet Take 40 mg by mouth daily as needed.      potassium chloride (KLOR-CON) 10 MEQ TbSR Take 1 tablet (10 mEq total) by mouth once daily. 30 tablet 1    pravastatin (PRAVACHOL) 10 MG tablet TAKE 1 TABLET BY MOUTH EVERY DAY 90 tablet 2    pravastatin (PRAVACHOL) 10 MG tablet Take 1 tablet by mouth once daily.      sodium bicarbonate 650 MG tablet Take 1,300 mg by mouth 3 (three) times daily.      spironolactone (ALDACTONE) 25 MG tablet Take 0.5 tablets (12.5 mg total) by mouth once daily. 15 tablet 11    traMADoL (ULTRAM) 50 mg tablet Take 1 tablet (50 mg total) by mouth every 12 (twelve) hours as needed for Pain (rheumatoid arthritis). 60 tablet 3    triamcinolone acetonide 0.1% (KENALOG) 0.1 % ointment Apply topically 2 (two) times daily. 453 g 0    empagliflozin (JARDIANCE) 25 mg tablet Take 1 tablet (25 mg total) by mouth once daily. (Patient not taking: Reported on  4/2/2025) 30 tablet 3    ferrous sulfate (FEOSOL) 325 mg (65 mg iron) Tab tablet TAKE 1 TABLET BY MOUTH BEFORE EVENING MEAL. (Patient not taking: Reported on 4/2/2025) 90 tablet 0    hydroxychloroquine (PLAQUENIL) 200 mg tablet TAKE 1 TABLET BY MOUTH TWICE A DAY (Patient not taking: Reported on 4/2/2025) 180 tablet 2    insulin aspart U-100 (NOVOLOG FLEXPEN U-100 INSULIN) 100 unit/mL (3 mL) InPn pen Inject 5 Units into the skin 3 (three) times daily with meals. ((PRN sliding scale -199: 2 units  -249: 4 units  -299: 7 units  -349: 10 units  BG Over 350: 12 units )) Max daily dose 25 units 1 each 1    leflunomide (ARAVA) 20 MG Tab Take 1 tablet (20 mg total) by mouth once daily. (Patient not taking: Reported on 3/13/2025) 90 tablet 1    losartan (COZAAR) 25 MG tablet Take 1 tablet (25 mg total) by mouth once daily. 90 tablet 3    semaglutide (OZEMPIC) 0.25 mg or 0.5 mg (2 mg/3 mL) pen injector Inject 0.25 mg SQ weekly x 4 wks; then increase to 0.5 mg SQ weekly 3 mL 4     No current facility-administered medications for this visit.

## 2025-04-02 NOTE — TELEPHONE ENCOUNTER
----- Message from Jonathon sent at 4/2/2025  9:54 AM CDT -----  Type: Needs Medical AdviceWho Called:  eleanor from pharm Pharmacy name and phone #:  [id number] [place] [address] [facility] [address] [facility] [address] [facility] [address] [place] [address]Phone: [phone] Fax: [phone] Information: eleanor from pharm calling to check on status of empagliflozin (JARDIANCE) 25 mg tablet prior authorization. Please call back to advise, Thanks!

## 2025-04-02 NOTE — TELEPHONE ENCOUNTER
I spoke with Rosie and she stated she will start working on the PA. I spoke with Denisa from the pharmacy and informed her we will start working on it.

## 2025-04-03 LAB
OHS QRS DURATION: 114 MS
OHS QTC CALCULATION: 493 MS

## 2025-04-04 ENCOUNTER — RESULTS FOLLOW-UP (OUTPATIENT)
Dept: FAMILY MEDICINE | Facility: CLINIC | Age: 60
End: 2025-04-04
Payer: COMMERCIAL

## 2025-04-04 DIAGNOSIS — E11.69 DM TYPE 2 WITH DIABETIC MIXED HYPERLIPIDEMIA: ICD-10-CM

## 2025-04-04 DIAGNOSIS — E78.2 DM TYPE 2 WITH DIABETIC MIXED HYPERLIPIDEMIA: ICD-10-CM

## 2025-04-04 DIAGNOSIS — J31.0 RHINITIS, UNSPECIFIED TYPE: ICD-10-CM

## 2025-04-04 DIAGNOSIS — T50.2X5A DIURETIC-INDUCED HYPOKALEMIA: Primary | ICD-10-CM

## 2025-04-04 DIAGNOSIS — E87.6 DIURETIC-INDUCED HYPOKALEMIA: Primary | ICD-10-CM

## 2025-04-04 NOTE — TELEPHONE ENCOUNTER
Refill Routing Note   Medication(s) are not appropriate for processing by [facility] for the following reason(s):        No active prescription written by provider  New or recently adjusted medication    [facility] action(s):  Defer               Appointments  past 12m or future 3m with PCP    Date Provider   Last Visit   3/25/2025 Rosales Boles MD   Next Visit   4/30/2025 Rosales Boles MD   ED visits in past 90 days: 0        Note composed:3:06 PM 04/04/2025

## 2025-04-07 RX ORDER — CETIRIZINE HYDROCHLORIDE 10 MG/1
10 TABLET ORAL NIGHTLY
Qty: 90 TABLET | Refills: 3 | Status: SHIPPED | OUTPATIENT
Start: 2025-04-07

## 2025-04-07 NOTE — PROGRESS NOTES
"Subjective     HPI    I had the pleasure of seeing Domi Leary in consultation at your request for the evaluation of atrial arrhythmias. She is a 60F who was admitted to [facility] in 2/2025 with ADHF and MSOF. Ultimately resulted in 22d hospitalization. Per discharge summary: "Domi Leary is a 59 y.o. female with PMH rheumatoid DM2, HLD, seropositive rheumatoid arthritis, Felty syndrome with splenectomy, hypothyroidism who presented to the [place] ED initially on 2/5/2025 for 4-day history of flu-like symptoms, including fever, SOB, productive cough, vomiting, and an inability tolerate solid food. She was admitted to [facility] MICU on 2/5/25 for sepsis with multiorgan failure. Echo demonstrated new onset HFrEF with severe LV dysfunction (EF of 10-15%), requiring right heart cath on 2/6 to assess hemodynamic status, determined to be mixed distributive/cardiogenic shock. The patient was also noted to have worsening renal function, with Cr elevation from baseline of 0.7. Patient was transferred to CCU on 2/7, started on dobutamine initially but switched to amiodarone after developing SVT. Renal function continued to worsen requiring HD on 2/11, 2/12, 2/14. Patient hospital stay was complicated after a spontaneous retroperitoneal bleeding, anterior branch of left internal illiac with unknown source by IR in 2/11. Hgb decrease from 9 to 7 overnight of 2/10-2/11, given 3 units pRBC on 2/11 and 2 units on 2/12. Bilateral US consistent with nonocclusive thrombus in the mid right superficial femoral vein, and nonocclusive thrombus in the left peroneal vein in the calf."    Patient was stepped down to the floor from CCU on 2/14. After reviewing risks and benefits patient decided for an IVC filter vs undergoing heparin re-challenged. Patient IVC filter was placed on 2/19. Patient then converted back on sustained SVT that was not controlled with metoprolol, amiodarone, and pushes of adenosine. Patient underwent cardioversion " "2/27 converting back to sinus rhythm. As part of the work-up for SVT, she underwent a V/Q on 2/24 showing multiple segmental defects in lower lobes of lung bilaterally. Based on shared decisions between patient and cardiology, patient will continue to hold heparin gtt for now.     In addition, when patient initially presented to the hospital, she was found in iatrogenic hyperthyroidism. Patient was on home dose synthroid 200 mg. She was then discontinued, and started at a lower dose Synthroid 200 mg daily on 2/16. Inpatient endocrinology recommending re-check of labs within 6 -8 weeks after re-starting Synthroid."    Pt states that these troubles all began with her getting the flu.    Pt currently on amiodarone 200 mg daily, toprol xl 200 mg daily, no anticoagulation.    I reviewed a rhytm strip dated 2/20/2025 at 1523 which showed initiation of SVT at 130-140 bpm. Sudden onset. Irregularity at outset.    Pt currently on bb, ARB, lasix. States she is close to her baseline. No history of arrhythmias or palpitations leading up to 2/2025 hospitalization. Currently wearing event 2 week Zio monitor.    Review of Systems   Constitutional: Positive for malaise/fatigue. Negative for decreased appetite, weight gain and weight loss.   HENT:  Negative for sore throat.    Eyes:  Negative for blurred vision.   Cardiovascular:  Negative for chest pain, dyspnea on exertion, irregular heartbeat, leg swelling, near-syncope, orthopnea, palpitations, paroxysmal nocturnal dyspnea and syncope.   Respiratory:  Negative for shortness of breath.    Skin:  Negative for rash.   Musculoskeletal:  Negative for arthritis.   Gastrointestinal:  Negative for abdominal pain.   Neurological:  Negative for focal weakness.   Psychiatric/Behavioral:  Negative for altered mental status.           Objective     Physical Exam  Vitals and nursing note reviewed.   Constitutional:       General: She is not in acute distress.     Appearance: She is " well-developed.   HENT:      Head: Normocephalic and atraumatic.   Eyes:      General: No scleral icterus.     Conjunctiva/sclera: Conjunctivae normal.      Pupils: Pupils are equal, round, and reactive to light.   Neck:      Thyroid: No thyromegaly.      Vascular: No JVD.   Cardiovascular:      Rate and Rhythm: Normal rate and regular rhythm.      Pulses: Intact distal pulses.      Heart sounds: Normal heart sounds. No murmur heard.     No friction rub. No gallop.   Pulmonary:      Effort: Pulmonary effort is normal. No respiratory distress.      Breath sounds: Normal breath sounds.   Abdominal:      General: Bowel sounds are normal. There is no distension.      Palpations: Abdomen is soft.   Musculoskeletal:      Cervical back: Normal range of motion and neck supple.   Skin:     General: Skin is warm and dry.   Neurological:      Mental Status: She is alert and oriented to person, place, and time.   Psychiatric:         Behavior: Behavior normal.            Assessment and Plan     1. Essential hypertension    2. Mixed hyperlipidemia    3. DM type 2 with diabetic mixed hyperlipidemia    4. Hypothyroidism due to acquired atrophy of thyroid    5. Type 2 diabetes mellitus with diabetic neuropathy, without long-term current use of insulin        Plan:     In summary, Domi Leary is a 60F with a history of various arrhythmias in the setting of a 22-day hospitalization for ADHF, septic shock with multisystem organ failure. Per limited records from [facility], pt seems to have presented in sinus rhythm, and only developed other arrhythmias in the setting of this significant physiologic stress.    Have requested all ECGs from [facility], for now will f/u on upcoming event monitor. Will repeat echo in 2 months. Reduce amiodarone to 100 mg daily. Follow-up 3 months.    Thank you for allowing me to participate in the care of this patient. Please do not hesitate to call me with any questions or concerns.         Patient/EMS

## 2025-04-08 ENCOUNTER — TELEPHONE (OUTPATIENT)
Dept: FAMILY MEDICINE | Facility: CLINIC | Age: 60
End: 2025-04-08
Payer: COMMERCIAL

## 2025-04-08 ENCOUNTER — HOSPITAL ENCOUNTER (OUTPATIENT)
Dept: CARDIOLOGY | Facility: CLINIC | Age: 60
Discharge: HOME | End: 2025-04-08
Attending: INTERNAL MEDICINE
Payer: COMMERCIAL

## 2025-04-08 DIAGNOSIS — I50.20 HFREF (HEART FAILURE WITH REDUCED EJECTION FRACTION): ICD-10-CM

## 2025-04-08 DIAGNOSIS — I48.92 ATRIAL FLUTTER, UNSPECIFIED TYPE: ICD-10-CM

## 2025-04-08 NOTE — TELEPHONE ENCOUNTER
----- Message from Martina sent at 4/7/2025  4:19 PM CDT -----  Regarding: prior auth  Contact: Geovani [facility]  Type:  Pharmacy Calling to Clarify an RXName of Caller:  GeovaniPharmacy Name:  [facility]:  empagliflozin (JARDIANCE) 25 mg tabletWhat do they need to clarify?:  prior authBest Call Back Number:  [phone] Information:  Please call Geovani to advise.  Thanks!

## 2025-04-09 ENCOUNTER — OFFICE VISIT (OUTPATIENT)
Dept: CARDIOLOGY | Facility: CLINIC | Age: 60
End: 2025-04-09
Payer: COMMERCIAL

## 2025-04-09 VITALS
WEIGHT: 181 LBS | BODY MASS INDEX: 32.07 KG/M2 | HEART RATE: 76 BPM | DIASTOLIC BLOOD PRESSURE: 80 MMHG | RESPIRATION RATE: 16 BRPM | SYSTOLIC BLOOD PRESSURE: 126 MMHG | HEIGHT: 63 IN

## 2025-04-09 DIAGNOSIS — E03.4 HYPOTHYROIDISM DUE TO ACQUIRED ATROPHY OF THYROID: ICD-10-CM

## 2025-04-09 DIAGNOSIS — E78.2 DM TYPE 2 WITH DIABETIC MIXED HYPERLIPIDEMIA: ICD-10-CM

## 2025-04-09 DIAGNOSIS — I50.20 HFREF (HEART FAILURE WITH REDUCED EJECTION FRACTION): ICD-10-CM

## 2025-04-09 DIAGNOSIS — E11.40 TYPE 2 DIABETES MELLITUS WITH DIABETIC NEUROPATHY, WITHOUT LONG-TERM CURRENT USE OF INSULIN: ICD-10-CM

## 2025-04-09 DIAGNOSIS — E78.2 MIXED HYPERLIPIDEMIA: ICD-10-CM

## 2025-04-09 DIAGNOSIS — I48.92 ATRIAL FLUTTER, UNSPECIFIED TYPE: ICD-10-CM

## 2025-04-09 DIAGNOSIS — E11.69 DM TYPE 2 WITH DIABETIC MIXED HYPERLIPIDEMIA: ICD-10-CM

## 2025-04-09 DIAGNOSIS — I10 ESSENTIAL HYPERTENSION: Primary | ICD-10-CM

## 2025-04-09 PROCEDURE — 3060F POS MICROALBUMINURIA REV: CPT | Mod: CPTII,S$GLB,, | Performed by: INTERNAL MEDICINE

## 2025-04-09 PROCEDURE — 99205 OFFICE O/P NEW HI 60 MIN: CPT | Mod: S$GLB,,, | Performed by: INTERNAL MEDICINE

## 2025-04-09 PROCEDURE — 3079F DIAST BP 80-89 MM HG: CPT | Mod: CPTII,S$GLB,, | Performed by: INTERNAL MEDICINE

## 2025-04-09 PROCEDURE — 3074F SYST BP LT 130 MM HG: CPT | Mod: CPTII,S$GLB,, | Performed by: INTERNAL MEDICINE

## 2025-04-09 PROCEDURE — 4010F ACE/ARB THERAPY RXD/TAKEN: CPT | Mod: CPTII,S$GLB,, | Performed by: INTERNAL MEDICINE

## 2025-04-09 PROCEDURE — 1159F MED LIST DOCD IN RCRD: CPT | Mod: CPTII,S$GLB,, | Performed by: INTERNAL MEDICINE

## 2025-04-09 PROCEDURE — 3051F HG A1C>EQUAL 7.0%<8.0%: CPT | Mod: CPTII,S$GLB,, | Performed by: INTERNAL MEDICINE

## 2025-04-09 PROCEDURE — 3008F BODY MASS INDEX DOCD: CPT | Mod: CPTII,S$GLB,, | Performed by: INTERNAL MEDICINE

## 2025-04-09 PROCEDURE — 99999 PR PBB SHADOW E&M-EST. PATIENT-LVL IV: CPT | Mod: PBBFAC,,, | Performed by: INTERNAL MEDICINE

## 2025-04-09 PROCEDURE — 3066F NEPHROPATHY DOC TX: CPT | Mod: CPTII,S$GLB,, | Performed by: INTERNAL MEDICINE

## 2025-04-09 RX ORDER — AMIODARONE HYDROCHLORIDE 200 MG/1
100 TABLET ORAL DAILY
Qty: 90 TABLET | Refills: 3 | Status: SHIPPED | OUTPATIENT
Start: 2025-04-09

## 2025-04-10 ENCOUNTER — TELEPHONE (OUTPATIENT)
Dept: FAMILY MEDICINE | Facility: CLINIC | Age: 60
End: 2025-04-10
Payer: COMMERCIAL

## 2025-04-10 DIAGNOSIS — E78.2 DM TYPE 2 WITH DIABETIC MIXED HYPERLIPIDEMIA: Primary | ICD-10-CM

## 2025-04-10 DIAGNOSIS — E11.69 DM TYPE 2 WITH DIABETIC MIXED HYPERLIPIDEMIA: Primary | ICD-10-CM

## 2025-04-10 RX ORDER — GLIMEPIRIDE 2 MG/1
2 TABLET ORAL
Qty: 30 TABLET | Refills: 3 | Status: SHIPPED | OUTPATIENT
Start: 2025-04-10 | End: 2025-04-14

## 2025-04-10 NOTE — TELEPHONE ENCOUNTER
----- Message from Laith sent at 4/9/2025  4:49 PM CDT -----  Type:  Needs Medical AdviceWho Called: Allison RUIZ Symptoms (please be specific): blood sugar 317  How long has patient had these symptoms:  few days Pharmacy name and phone #[facility]  [id number] [address] [place] [address]Phone: [phone] Fax: [phone] the patient rather a call back or a response via [facility]? If needed Best Call Back Number: [phone] Allison Additional Information: pt only checking and giving her self insulin once a day,  please call to advise, Thank You.

## 2025-04-10 NOTE — TELEPHONE ENCOUNTER
SARA Cooper  aware that pt can restart Glimeperide daily.     [facility] reports that pt was not able to get Farxiga or Jardiance approved and is not taking either of these meds . Verified w/ GEORGEN, pt insurance is correct .      SARA is scheduled to see pt next week on Monday .--lp

## 2025-04-10 NOTE — TELEPHONE ENCOUNTER
SARA Cooper states pt blood sugar was at 317 and she spoke with her on yesterday to start taking the novolog as prescribed. She stated she will follow up with her on next week and see if it'll go down by then. I saw your message about rechecking labs this week or next week sometime. Pt has a lab apt on 4/24 did you want it to be moved up?

## 2025-04-10 NOTE — TELEPHONE ENCOUNTER
She can also restart her prev rx Glimepiride daily     Did she get the Farxiga rx approved ?   Make sure her insurance and rx card is correct in chart

## 2025-04-12 DIAGNOSIS — E11.69 DM TYPE 2 WITH DIABETIC MIXED HYPERLIPIDEMIA: ICD-10-CM

## 2025-04-12 DIAGNOSIS — E78.2 DM TYPE 2 WITH DIABETIC MIXED HYPERLIPIDEMIA: ICD-10-CM

## 2025-04-12 NOTE — TELEPHONE ENCOUNTER
Care Due:                  Date            Visit Type   Department     Provider  --------------------------------------------------------------------------------                                EP -                              PRIMARY      [facility]  Last Visit: 03-      CARE ([facility])   MEDICINE       Rosales Boles                              SAME DAY -                              ESTABLISHED   [facility]  Next Visit: 04-      PATIENT      MEDICINE       Rosales Boles                                                            Last  Test          Frequency    Reason                     Performed    Due Date  --------------------------------------------------------------------------------    HBA1C.......  6 months...  empagliflozin,             08- 01-                             glimepiride, insulin,                             semaglutide..............    [employer] Embedded Care Due Messages. Reference number: [id number].   4/12/2025 10:02:18 AM CDT

## 2025-04-13 NOTE — TELEPHONE ENCOUNTER
Refill Routing Note   Medication(s) are not appropriate for processing by [facility] for the following reason(s):        New or recently adjusted medication  Required labs abnormal    [facility] action(s):  Defer     Requires labs : Yes       Order recently sent 4/10/25 and has been filled.       Appointments  past 12m or future 3m with PCP    Date Provider   Last Visit   3/25/2025 Rosales Boles MD   Next Visit   4/30/2025 Rosales Boles MD   ED visits in past 90 days: 0        Note composed:11:06 AM 04/13/2025

## 2025-04-14 RX ORDER — GLIMEPIRIDE 2 MG/1
2 TABLET ORAL
Qty: 90 TABLET | Refills: 1 | Status: SHIPPED | OUTPATIENT
Start: 2025-04-14 | End: 2026-04-14

## 2025-04-16 DIAGNOSIS — R60.9 FLUID RETENTION: ICD-10-CM

## 2025-04-16 DIAGNOSIS — B33.24 VIRAL CARDIOMYOPATHY: ICD-10-CM

## 2025-04-16 DIAGNOSIS — E87.6 DIURETIC-INDUCED HYPOKALEMIA: ICD-10-CM

## 2025-04-16 DIAGNOSIS — T50.2X5A DIURETIC-INDUCED HYPOKALEMIA: ICD-10-CM

## 2025-04-16 RX ORDER — POTASSIUM CHLORIDE 750 MG/1
10 TABLET, EXTENDED RELEASE ORAL
Qty: 90 TABLET | Refills: 3 | Status: SHIPPED | OUTPATIENT
Start: 2025-04-16

## 2025-04-16 RX ORDER — FUROSEMIDE 40 MG/1
TABLET ORAL
Qty: 135 TABLET | Refills: 0 | Status: SHIPPED | OUTPATIENT
Start: 2025-04-16

## 2025-04-16 NOTE — TELEPHONE ENCOUNTER
Refill Routing Note   Medication(s) are not appropriate for processing by [facility] for the following reason(s):        New or recently adjusted medication  Required labs abnormal    [facility] action(s):  Defer        Medication Therapy Plan: New start (3/13/25)      Appointments  past 12m or future 3m with PCP    Date Provider   Last Visit   3/25/2025 Rosales Boles MD   Next Visit   4/30/2025 Rosales Boles MD   ED visits in past 90 days: 0        Note composed:2:23 PM 04/16/2025

## 2025-04-16 NOTE — TELEPHONE ENCOUNTER
No care due was identified.  [facility] Embedded Care Due Messages. Reference number: [id number].   4/16/2025 7:31:46 AM CDT

## 2025-04-16 NOTE — TELEPHONE ENCOUNTER
Refill Routing Note   Medication(s) are not appropriate for processing by [facility] for the following reason(s):        New or recently adjusted medication  Required labs abnormal    [facility] action(s):  Defer             Appointments  past 12m or future 3m with PCP    Date Provider   Last Visit   3/25/2025 Rosales Boles MD   Next Visit   4/30/2025 Rosales Boles MD   ED visits in past 90 days: 0        Note composed:7:54 AM 04/16/2025

## 2025-04-16 NOTE — TELEPHONE ENCOUNTER
No care due was identified.  [facility] Embedded Care Due Messages. Reference number: [id number].   4/16/2025 8:33:26 AM CDT

## 2025-04-22 ENCOUNTER — DOCUMENT SCAN (OUTPATIENT)
Dept: HOME HEALTH SERVICES | Facility: HOSPITAL | Age: 60
End: 2025-04-22
Payer: COMMERCIAL

## 2025-04-24 ENCOUNTER — APPOINTMENT (OUTPATIENT)
Dept: LAB | Facility: HOSPITAL | Age: 60
End: 2025-04-24
Attending: INTERNAL MEDICINE
Payer: COMMERCIAL

## 2025-04-29 ENCOUNTER — DOCUMENT SCAN (OUTPATIENT)
Dept: HOME HEALTH SERVICES | Facility: HOSPITAL | Age: 60
End: 2025-04-29
Payer: COMMERCIAL

## 2025-04-30 ENCOUNTER — OFFICE VISIT (OUTPATIENT)
Dept: FAMILY MEDICINE | Facility: CLINIC | Age: 60
End: 2025-04-30
Payer: COMMERCIAL

## 2025-04-30 VITALS
HEIGHT: 63 IN | OXYGEN SATURATION: 98 % | SYSTOLIC BLOOD PRESSURE: 122 MMHG | HEART RATE: 77 BPM | RESPIRATION RATE: 18 BRPM | BODY MASS INDEX: 29.72 KG/M2 | DIASTOLIC BLOOD PRESSURE: 70 MMHG | WEIGHT: 167.75 LBS

## 2025-04-30 DIAGNOSIS — Z23 IMMUNIZATION DUE: ICD-10-CM

## 2025-04-30 DIAGNOSIS — E78.2 DM TYPE 2 WITH DIABETIC MIXED HYPERLIPIDEMIA: ICD-10-CM

## 2025-04-30 DIAGNOSIS — N18.32 STAGE 3B CHRONIC KIDNEY DISEASE: ICD-10-CM

## 2025-04-30 DIAGNOSIS — I50.20 HEART FAILURE WITH REDUCED EJECTION FRACTION: ICD-10-CM

## 2025-04-30 DIAGNOSIS — E11.69 DM TYPE 2 WITH DIABETIC MIXED HYPERLIPIDEMIA: ICD-10-CM

## 2025-04-30 DIAGNOSIS — N18.30 STAGE 3 CHRONIC KIDNEY DISEASE, UNSPECIFIED WHETHER STAGE 3A OR 3B CKD: ICD-10-CM

## 2025-04-30 DIAGNOSIS — I47.10 PAROXYSMAL SVT (SUPRAVENTRICULAR TACHYCARDIA): ICD-10-CM

## 2025-04-30 DIAGNOSIS — I48.92 ATRIAL FLUTTER, UNSPECIFIED TYPE: ICD-10-CM

## 2025-04-30 DIAGNOSIS — R05.8 DRY COUGH: ICD-10-CM

## 2025-04-30 DIAGNOSIS — I82.4Y3 ACUTE DEEP VEIN THROMBOSIS (DVT) OF PROXIMAL VEIN OF BOTH LOWER EXTREMITIES: ICD-10-CM

## 2025-04-30 DIAGNOSIS — Z00.00 WELLNESS EXAMINATION: Primary | ICD-10-CM

## 2025-04-30 PROCEDURE — 99999 PR PBB SHADOW E&M-EST. PATIENT-LVL V: CPT | Mod: PBBFAC,,, | Performed by: INTERNAL MEDICINE

## 2025-04-30 PROCEDURE — G0179 MD RECERTIFICATION HHA PT: HCPCS | Mod: ,,, | Performed by: INTERNAL MEDICINE

## 2025-04-30 NOTE — PROGRESS NOTES
Subjective:       Patient ID: Domi Leary is a 60 y.o. female.    Chief Complaint: Follow-up (Patient is here for a follow up but started a cough about a week ago. ) and Annual Exam   HPI     History of Present Illness              Gyn: Dr Sanchez menopause   MM/21 discussed patient defer  Dexa: never / recommended Rx placed  Colonoscopy:  O 3/23 r/c 5 yr   Immunizations: Flu: D Tdap:  Prevnar 13: 2016 Shingles:  Recommend (needs updated vaccines) a splenium  Smoker:  Former  Derm: Dr Ryne PARRA psoriasis  Eye: O   Prev PCP Dr Nicole    Wellness     Dry cough for 1 wk ? ARB - no increased allergies       Jardiance 25 approved finally and taking   Glimipiride 2 daily am     Still having tight lower extremity swelling but improved.  Currently taking 650 sodium bicarb 3 times a day.  Will cut dose to half tab 3 times a day get new labs this week.  Last CO2 slightly elevated.     Heart failure reduced EF 10 15%  Paroxysmal SVT/atrial flutter   Mgmt [facility] [place] Rudy Ibarra MD     Acute kidney injury/currently chronic kidney satge 3 : 34   Acute B DVT: US + nonocclusive thrombus mid right superficial femoral vein, nonocclusive thrombus left peroneal vein in the calf. IVC filter was placed on  - plan is removal      Type 2 diabetes:  checking glucose much improved. Not needing IR insulin.  A1c  7.4  /   Rx Glimepiride 2, Jardiance 25  previous Ozempic 1 mg, Glimepiride 4 Synjardy 1 q.d.  Prev A1c >14  // .  (SE: Metformin ER 1000 diarrhea. )     Hypothyroid:  mild low TSH 0.02 cut to 1/2 tab Mark and 1 tab other days.  Rx Levo 200     Mixed HLD:  LDL goal < 70// controlled  Rx Pravastatin 10     Elevated liver enzymes: Cyclic   Chronic low albumin: Increase protein in diet      Rheumatoid arthritis: stable // holding Plaquenil 200, Leflunomide 20   Mgmt Rheum Dr Chakraborty               Off previous biologic     Iron Def w/oanemia.  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    Prev mgmt Heme  Dr Calle      Metabolic syndrome BMI 33 & 187, 31 & 176, 32/181-184, 29/187         ____________________________________________________________________________________________________  Assessment & Plan:  1. Wellness examination    2. Immunization due  - DIPH,PERTUSS(ACEL),TET VAC(PF)(ADULT)(ADACEL)(TDaP)    3. Dry cough    4. Paroxysmal SVT (supraventricular tachycardia)    5. Atrial flutter, unspecified type    6. Acute deep vein thrombosis (DVT) of proximal vein of both lower extremities    7. Stage 3 chronic kidney disease, unspecified whether stage 3a or 3b CKD     Wellness examination    Immunization due  -     DIPH,PERTUSS(ACEL),TET VAC(PF)(ADULT)(ADACEL)(TDaP)    Dry cough  Comments:  ? losartan side effect monitor    Paroxysmal SVT (supraventricular tachycardia)    Atrial flutter, unspecified type    Acute deep vein thrombosis (DVT) of proximal vein of both lower extremities    Stage 3 chronic kidney disease, unspecified whether stage 3a or 3b CKD        Continue to work on maintain healthy weight, balanced diet. Avoid unhealthy habits: smoking/vaping, excessive alcohol intake.     Recommend diet exercise:  High protein, low fat, low carb diet - calories women under <1200 & men <1800, carbs <100 G, protein 50-60 G daily. Protein supplements to replace one meal.  Keep all beverages < 10 calories per serving. Keep snacks < 100 calories.   Recommend exercise 160 minutes per week, combo of cardio and weight/strength training.     Visit today included increased complexity associated with the care of the episodic problem addressed and managing the longitudinal care of the patient due to the serious and/or complex managed problem(s). HTN      Disclaimer: This note was partly generated using dictation software which may occasionally result in transcription  errors  ____________________________________________________________________________________________________  Review of Systems:  Review of Systems      Leg swelling     Objective:     Wt Readings from Last 3 Encounters:   04/30/25 76.1 kg (167 lb 12.3 oz)   04/09/25 82.1 kg (181 lb)   04/02/25 82.1 kg (181 lb)     BP Readings from Last 3 Encounters:   04/30/25 122/70   04/09/25 126/80   04/02/25 116/76       Lab Results   Component Value Date    WBC 12.10 03/19/2025    HGB 10.5 (L) 03/19/2025    HCT 35.3 (L) 03/19/2025     03/19/2025     03/31/2025    K 3.0 (L) 03/31/2025    CL 95 03/31/2025    ALT 12 03/31/2025    AST 20 03/31/2025    CO2 33 (H) 03/31/2025    CREATININE 1.7 (H) 03/31/2025    BUN 25 (H) 03/31/2025     (H) 03/31/2025      Hemoglobin A1C   Date Value Ref Range Status   02/06/2025 7.9 (H) 4.7 - 5.6 % Final   08/02/2024 7.7 (H) 4.0 - 5.6 % Final     Comment:     ADA Screening Guidelines:  5.7-6.4%  Consistent with prediabetes  >or=6.5%  Consistent with diabetes    High levels of fetal hemoglobin interfere with the HbA1C  assay. Heterozygous hemoglobin variants (HbS, HgC, etc)do  not significantly interfere with this assay.   However, presence of multiple variants may affect accuracy.     02/06/2024 7.4 (H) 4.0 - 5.6 % Final     Comment:     ADA Screening Guidelines:  5.7-6.4%  Consistent with prediabetes  >or=6.5%  Consistent with diabetes    High levels of fetal hemoglobin interfere with the HbA1C  assay. Heterozygous hemoglobin variants (HbS, HgC, etc)do  not significantly interfere with this assay.   However, presence of multiple variants may affect accuracy.     05/08/2023 7.8 (H) 4.0 - 5.6 % Final     Comment:     ADA Screening Guidelines:  5.7-6.4%  Consistent with prediabetes  >or=6.5%  Consistent with diabetes    High levels of fetal hemoglobin interfere with the HbA1C  assay. Heterozygous hemoglobin variants (HbS, HgC, etc)do  not significantly interfere with this assay.    However, presence of multiple variants may affect accuracy.        Lab Results   Component Value Date    TSH 0.090 (L) 04/24/2025    TSH 46.696 (H) 03/19/2025    TSH 14.62 (H) 02/25/2025     Lab Results   Component Value Date    FREET4 1.78 (H) 04/24/2025    FREET4 0.94 03/19/2025    FREET4 0.86 08/02/2024     Lab Results   Component Value Date    LDLCALC 63.0 03/19/2025    LDLCALC 32 02/05/2025    LDLCALC 59.4 (L) 08/02/2024     Lab Results   Component Value Date    TRIG 95 03/19/2025    TRIG 102 02/05/2025    TRIG 198 (H) 08/02/2024            Physical Exam  Constitutional:       Appearance: Normal appearance.   HENT:      Head: Normocephalic and atraumatic.   Eyes:      Extraocular Movements: Extraocular movements intact.      Pupils: Pupils are equal, round, and reactive to light.   Cardiovascular:      Rate and Rhythm: Normal rate and regular rhythm.   Pulmonary:      Effort: Pulmonary effort is normal.      Breath sounds: Normal breath sounds.   Musculoskeletal:      Right lower leg: Edema present.      Left lower leg: Edema present.      Comments: B tense to mid calf 1 +   Neurological:      Mental Status: She is alert and oriented to person, place, and time.   Psychiatric:         Mood and Affect: Mood normal.           Medication List with Changes/Refills   Current Medications    AMIODARONE (PACERONE) 200 MG TAB    Take 0.5 tablets (100 mg total) by mouth once daily.    BLOOD SUGAR DIAGNOSTIC STRP    To check BG 1 times daily, to use with insurance preferred meter    EMPAGLIFLOZIN (JARDIANCE) 25 MG TABLET    Take 1 tablet (25 mg total) by mouth once daily.    FUROSEMIDE (LASIX) 40 MG TABLET    TAKE 1 TABLET BY MOUTH IN THE MORNING DAILY & 1/2 TABLET AT NOON X 3 DAYS AS NEEDED FOR SWELLING    GLIMEPIRIDE (AMARYL) 2 MG TABLET    TAKE 1 TABLET (2 MG TOTAL) BY MOUTH BEFORE BREAKFAST    INSULIN ASPART U-100 (NOVOLOG FLEXPEN U-100 INSULIN) 100 UNIT/ML (3 ML) INPN PEN    Inject 5 Units into the skin 3 (three)  times daily with meals. ((PRN sliding scale -199: 2 units  -249: 4 units  -299: 7 units  -349: 10 units  BG Over 350: 12 units )) Max daily dose 25 units    LANCETS MISC    To check BG 1 times daily, to use with insurance preferred meter    LEVOTHYROXINE (SYNTHROID) 200 MCG TABLET    1 tab po 6 x wkly & 1/2 tab po Sunday    LOSARTAN (COZAAR) 25 MG TABLET    Take 1 tablet (25 mg total) by mouth once daily.    METOPROLOL SUCCINATE (TOPROL-XL) 200 MG 24 HR TABLET    Take 200 mg by mouth once daily.    MULTIVITAMIN WITH FOLIC ACID 400 MCG TAB    Take 1 tablet by mouth once daily.    PANTOPRAZOLE (PROTONIX) 40 MG TABLET    Take 40 mg by mouth daily as needed.    POTASSIUM CHLORIDE (KLOR-CON) 10 MEQ TBSR    TAKE 1 TABLET BY MOUTH ONCE DAILY    PRAVASTATIN (PRAVACHOL) 10 MG TABLET    TAKE 1 TABLET BY MOUTH EVERY DAY    SODIUM BICARBONATE 650 MG TABLET    Take 650 mg by mouth 3 (three) times daily.    SPIRONOLACTONE (ALDACTONE) 25 MG TABLET    Take 0.5 tablets (12.5 mg total) by mouth once daily.    TRAMADOL (ULTRAM) 50 MG TABLET    Take 1 tablet (50 mg total) by mouth every 12 (twelve) hours as needed for Pain (rheumatoid arthritis).    TRIAMCINOLONE ACETONIDE 0.1% (KENALOG) 0.1 % OINTMENT    Apply topically 2 (two) times daily.   Discontinued Medications    BLOOD-GLUCOSE METER KIT    To check BG 1 times daily, to use with insurance preferred meter    CETIRIZINE (ZYRTEC) 10 MG TABLET    TAKE 1 TABLET BY MOUTH EVERY DAY IN THE EVENING    FERROUS SULFATE (FEOSOL) 325 MG (65 MG IRON) TAB TABLET    TAKE 1 TABLET BY MOUTH BEFORE EVENING MEAL.    HYDROXYCHLOROQUINE (PLAQUENIL) 200 MG TABLET    TAKE 1 TABLET BY MOUTH TWICE A DAY    LEFLUNOMIDE (ARAVA) 20 MG TAB    Take 1 tablet (20 mg total) by mouth once daily.    PRAVASTATIN (PRAVACHOL) 10 MG TABLET    Take 1 tablet by mouth once daily.    SEMAGLUTIDE (OZEMPIC) 0.25 MG OR 0.5 MG (2 MG/3 ML) PEN INJECTOR    Inject 0.25 mg SQ weekly x 4 wks; then increase  to 0.5 mg SQ weekly

## 2025-05-01 ENCOUNTER — LAB VISIT (OUTPATIENT)
Dept: LAB | Facility: HOSPITAL | Age: 60
End: 2025-05-01
Payer: COMMERCIAL

## 2025-05-01 DIAGNOSIS — D84.9 IMMUNOSUPPRESSION: ICD-10-CM

## 2025-05-01 DIAGNOSIS — E11.69 DM TYPE 2 WITH DIABETIC MIXED HYPERLIPIDEMIA: ICD-10-CM

## 2025-05-01 DIAGNOSIS — N17.9 AKI (ACUTE KIDNEY INJURY): ICD-10-CM

## 2025-05-01 DIAGNOSIS — M05.79 SEROPOSITIVE RHEUMATOID ARTHRITIS OF MULTIPLE SITES: ICD-10-CM

## 2025-05-01 DIAGNOSIS — Z79.60 LONG-TERM USE OF IMMUNOSUPPRESSANT MEDICATION: ICD-10-CM

## 2025-05-01 DIAGNOSIS — I50.20 HFREF (HEART FAILURE WITH REDUCED EJECTION FRACTION): ICD-10-CM

## 2025-05-01 DIAGNOSIS — E78.2 DM TYPE 2 WITH DIABETIC MIXED HYPERLIPIDEMIA: ICD-10-CM

## 2025-05-01 LAB
ABSOLUTE EOSINOPHIL (OHS): 0.37 K/UL
ABSOLUTE MONOCYTE (OHS): 1.52 K/UL (ref 0.3–1)
ABSOLUTE NEUTROPHIL COUNT (OHS): 7.05 K/UL (ref 1.8–7.7)
ALBUMIN SERPL BCP-MCNC: 2.8 G/DL (ref 3.5–5.2)
ALBUMIN/CREAT UR: NORMAL
ALT SERPL W/O P-5'-P-CCNC: 24 UNIT/L (ref 10–44)
ANION GAP (OHS): 11 MMOL/L (ref 8–16)
BASOPHILS # BLD AUTO: 0.11 K/UL
BASOPHILS NFR BLD AUTO: 0.9 %
BNP SERPL-MCNC: 505 PG/ML (ref 0–99)
BUN SERPL-MCNC: 23 MG/DL (ref 6–20)
CALCIUM SERPL-MCNC: 9.6 MG/DL (ref 8.7–10.5)
CHLORIDE SERPL-SCNC: 101 MMOL/L (ref 95–110)
CHOLEST SERPL-MCNC: 133 MG/DL (ref 120–199)
CHOLEST/HDLC SERPL: 2.8 {RATIO} (ref 2–5)
CO2 SERPL-SCNC: 27 MMOL/L (ref 23–29)
CREAT SERPL-MCNC: 1.4 MG/DL (ref 0.5–1.4)
CREAT UR-MCNC: 15 MG/DL (ref 15–325)
CREAT UR-MCNC: 15 MG/DL (ref 15–325)
CRP SERPL-MCNC: 127.3 MG/L
EAG (OHS): 223 MG/DL (ref 68–131)
ERYTHROCYTE [DISTWIDTH] IN BLOOD BY AUTOMATED COUNT: 14.9 % (ref 11.5–14.5)
ERYTHROCYTE [SEDIMENTATION RATE] IN BLOOD BY PHOTOMETRIC METHOD: 43 MM/HR
GFR SERPLBLD CREATININE-BSD FMLA CKD-EPI: 43 ML/MIN/1.73/M2
GLUCOSE SERPL-MCNC: 90 MG/DL (ref 70–110)
HBA1C MFR BLD: 9.4 % (ref 4–5.6)
HCT VFR BLD AUTO: 34.4 % (ref 37–48.5)
HDLC SERPL-MCNC: 48 MG/DL (ref 40–75)
HDLC SERPL: 36.1 % (ref 20–50)
HGB BLD-MCNC: 10.8 GM/DL (ref 12–16)
IMM GRANULOCYTES # BLD AUTO: 0.05 K/UL (ref 0–0.04)
IMM GRANULOCYTES NFR BLD AUTO: 0.4 % (ref 0–0.5)
LDLC SERPL CALC-MCNC: 72.2 MG/DL (ref 63–159)
LYMPHOCYTES # BLD AUTO: 3.63 K/UL (ref 1–4.8)
MAGNESIUM SERPL-MCNC: 1.9 MG/DL (ref 1.6–2.6)
MCH RBC QN AUTO: 30.2 PG (ref 27–31)
MCHC RBC AUTO-ENTMCNC: 31.4 G/DL (ref 32–36)
MCV RBC AUTO: 96 FL (ref 82–98)
MICROALBUMIN UR-MCNC: <5 UG/ML (ref ?–5000)
NONHDLC SERPL-MCNC: 85 MG/DL
NUCLEATED RBC (/100WBC) (OHS): 0 /100 WBC
PHOSPHATE SERPL-MCNC: 3.7 MG/DL (ref 2.7–4.5)
PLATELET # BLD AUTO: 345 K/UL (ref 150–450)
PMV BLD AUTO: 11.1 FL (ref 9.2–12.9)
POTASSIUM SERPL-SCNC: 3.4 MMOL/L (ref 3.5–5.1)
PROT UR-MCNC: <7 MG/DL
PROT/CREAT UR: NORMAL MG/G{CREAT}
PTH-INTACT SERPL-MCNC: 112.7 PG/ML (ref 9–77)
RBC # BLD AUTO: 3.58 M/UL (ref 4–5.4)
RELATIVE EOSINOPHIL (OHS): 2.9 %
RELATIVE LYMPHOCYTE (OHS): 28.5 % (ref 18–48)
RELATIVE MONOCYTE (OHS): 11.9 % (ref 4–15)
RELATIVE NEUTROPHIL (OHS): 55.4 % (ref 38–73)
SODIUM SERPL-SCNC: 139 MMOL/L (ref 136–145)
TRIGL SERPL-MCNC: 64 MG/DL (ref 30–150)
WBC # BLD AUTO: 12.73 K/UL (ref 3.9–12.7)

## 2025-05-01 PROCEDURE — 83970 ASSAY OF PARATHORMONE: CPT

## 2025-05-01 PROCEDURE — 85025 COMPLETE CBC W/AUTO DIFF WBC: CPT

## 2025-05-01 PROCEDURE — 83036 HEMOGLOBIN GLYCOSYLATED A1C: CPT

## 2025-05-01 PROCEDURE — 36415 COLL VENOUS BLD VENIPUNCTURE: CPT | Mod: PN

## 2025-05-01 PROCEDURE — 82570 ASSAY OF URINE CREATININE: CPT

## 2025-05-01 PROCEDURE — 83880 ASSAY OF NATRIURETIC PEPTIDE: CPT

## 2025-05-01 PROCEDURE — 85652 RBC SED RATE AUTOMATED: CPT

## 2025-05-01 PROCEDURE — 86140 C-REACTIVE PROTEIN: CPT

## 2025-05-01 PROCEDURE — 80069 RENAL FUNCTION PANEL: CPT

## 2025-05-01 PROCEDURE — 83735 ASSAY OF MAGNESIUM: CPT

## 2025-05-01 PROCEDURE — 80061 LIPID PANEL: CPT

## 2025-05-01 PROCEDURE — 84460 ALANINE AMINO (ALT) (SGPT): CPT

## 2025-05-01 PROCEDURE — 84156 ASSAY OF PROTEIN URINE: CPT

## 2025-05-02 ENCOUNTER — RESULTS FOLLOW-UP (OUTPATIENT)
Dept: FAMILY MEDICINE | Facility: CLINIC | Age: 60
End: 2025-05-02
Payer: COMMERCIAL

## 2025-05-02 DIAGNOSIS — E78.2 DM TYPE 2 WITH DIABETIC MIXED HYPERLIPIDEMIA: Primary | ICD-10-CM

## 2025-05-02 DIAGNOSIS — E11.69 DM TYPE 2 WITH DIABETIC MIXED HYPERLIPIDEMIA: Primary | ICD-10-CM

## 2025-05-08 ENCOUNTER — OFFICE VISIT (OUTPATIENT)
Dept: NEPHROLOGY | Facility: CLINIC | Age: 60
End: 2025-05-08
Payer: COMMERCIAL

## 2025-05-08 VITALS
OXYGEN SATURATION: 97 % | BODY MASS INDEX: 29.72 KG/M2 | HEART RATE: 79 BPM | SYSTOLIC BLOOD PRESSURE: 116 MMHG | WEIGHT: 167.75 LBS | HEIGHT: 63 IN | DIASTOLIC BLOOD PRESSURE: 66 MMHG

## 2025-05-08 DIAGNOSIS — M05.79 SEROPOSITIVE RHEUMATOID ARTHRITIS OF MULTIPLE SITES: ICD-10-CM

## 2025-05-08 DIAGNOSIS — I50.42 CHRONIC COMBINED SYSTOLIC AND DIASTOLIC CONGESTIVE HEART FAILURE: ICD-10-CM

## 2025-05-08 DIAGNOSIS — N17.9 AKI (ACUTE KIDNEY INJURY): ICD-10-CM

## 2025-05-08 DIAGNOSIS — E03.4 HYPOTHYROIDISM DUE TO ACQUIRED ATROPHY OF THYROID: ICD-10-CM

## 2025-05-08 DIAGNOSIS — Z90.81 ASPLENIA AFTER SURGICAL PROCEDURE: ICD-10-CM

## 2025-05-08 DIAGNOSIS — N18.32 STAGE 3B CHRONIC KIDNEY DISEASE: Primary | ICD-10-CM

## 2025-05-08 DIAGNOSIS — E11.40 TYPE 2 DIABETES MELLITUS WITH DIABETIC NEUROPATHY, WITHOUT LONG-TERM CURRENT USE OF INSULIN: ICD-10-CM

## 2025-05-08 DIAGNOSIS — I10 ESSENTIAL HYPERTENSION: ICD-10-CM

## 2025-05-08 PROCEDURE — 3046F HEMOGLOBIN A1C LEVEL >9.0%: CPT | Mod: CPTII,S$GLB,, | Performed by: STUDENT IN AN ORGANIZED HEALTH CARE EDUCATION/TRAINING PROGRAM

## 2025-05-08 PROCEDURE — 4010F ACE/ARB THERAPY RXD/TAKEN: CPT | Mod: CPTII,S$GLB,, | Performed by: STUDENT IN AN ORGANIZED HEALTH CARE EDUCATION/TRAINING PROGRAM

## 2025-05-08 PROCEDURE — 3061F NEG MICROALBUMINURIA REV: CPT | Mod: CPTII,S$GLB,, | Performed by: STUDENT IN AN ORGANIZED HEALTH CARE EDUCATION/TRAINING PROGRAM

## 2025-05-08 PROCEDURE — 3008F BODY MASS INDEX DOCD: CPT | Mod: CPTII,S$GLB,, | Performed by: STUDENT IN AN ORGANIZED HEALTH CARE EDUCATION/TRAINING PROGRAM

## 2025-05-08 PROCEDURE — 3074F SYST BP LT 130 MM HG: CPT | Mod: CPTII,S$GLB,, | Performed by: STUDENT IN AN ORGANIZED HEALTH CARE EDUCATION/TRAINING PROGRAM

## 2025-05-08 PROCEDURE — 99999 PR PBB SHADOW E&M-EST. PATIENT-LVL III: CPT | Mod: PBBFAC,,, | Performed by: STUDENT IN AN ORGANIZED HEALTH CARE EDUCATION/TRAINING PROGRAM

## 2025-05-08 PROCEDURE — 99214 OFFICE O/P EST MOD 30 MIN: CPT | Mod: S$GLB,,, | Performed by: STUDENT IN AN ORGANIZED HEALTH CARE EDUCATION/TRAINING PROGRAM

## 2025-05-08 PROCEDURE — 3078F DIAST BP <80 MM HG: CPT | Mod: CPTII,S$GLB,, | Performed by: STUDENT IN AN ORGANIZED HEALTH CARE EDUCATION/TRAINING PROGRAM

## 2025-05-08 PROCEDURE — 3066F NEPHROPATHY DOC TX: CPT | Mod: CPTII,S$GLB,, | Performed by: STUDENT IN AN ORGANIZED HEALTH CARE EDUCATION/TRAINING PROGRAM

## 2025-05-08 RX ORDER — SPIRONOLACTONE 25 MG/1
25 TABLET ORAL DAILY
Qty: 30 TABLET | Refills: 11 | Status: SHIPPED | OUTPATIENT
Start: 2025-05-08 | End: 2025-06-10

## 2025-05-08 NOTE — PROGRESS NOTES
"[facility]  Nephrology Clinic      Subjective:       HPI ID: Domi Leary is a 60 y.o. female who returns for ongoing evaluation and management of CKD.     She is pertinent medical history of seropositive rheumatoid arthritis, diabetes mellitus type 2, hyperlipidemia, Felty syndrome with splenectomy, hypothyroidism.  Recently admitted to [facility] on hospital for sepsis with multiorgan failure with prolonged hospital stay for the month of February.  Attached is the discharge summary: "presented to the [place] ED initially on 2/5/2025 for 4-day history of flu-like symptoms, including fever, SOB, productive cough, vomiting, and an inability tolerate solid food. She was admitted to [facility] MICU on 2/5/25 for sepsis with multiorgan failure. Echo demonstrated new onset HFrEF with severe LV dysfunction (EF of 10-15%), requiring right heart cath on 2/6 to assess hemodynamic status, determined to be mixed distributive/cardiogenic shock. The patient was also noted to have worsening renal function, with Cr elevation from baseline of 0.7. Patient was transferred to CCU on 2/7, started on dobutamine initially but switched to amiodarone after developing SVT. Renal function continued to worsen requiring HD on 2/11, 2/12, 2/14. [HD line was removed on 02/22/2025] Patient hospital stay was complicated after  a spontaneous retroperitoneal bleeding, anterior branch of left internal illiac with unknown source by IR in 2/11. Hgb decrease from 9 to 7 overnight of 2/10-2/11, given 3 units pRBC on 2/11 and 2 units on 2/12. Bilateral US consistent with nonocclusive thrombus in the mid right superficial femoral vein, and nonocclusive thrombus in the left peroneal vein in the calf.     Patient was stepped down to the floor from CCU on 2/14. After reviewing risks and benefits patient decided for an IVC filter vs undergoing heparin re-challenged. Patient IVC filter was placed on 2/19. Patient then converted back on " "sustained SVT that was not controlled with metoprolol, amiodarone, and pushes of adenosine. Patient underwent cardioversion 2/27 converting back to sinus rhythm. As part of the work-up for SVT, she underwent a V/Q on 2/24 showing multiple segmental defects in lower lobes of lung bilaterally. Based on shared decisions between patient and  cardiology, patient will continue to hold heparin gtt for now.      In addition, when patient initially presented to the hospital, she was found in iatrogenic hyperthyroidism. Patient was on home dose synthroid 200 mg. She was then discontinued, and started at a lower dose Synthroid 200 mg daily on 2/16. Inpatient endocrinology recommending re-check of labs within 6 -8 weeks after re-starting Synthroid.      Patient was stable for discharge on 2/28. Patient did not meet criteria for SNF since she was able to walk more than 250 ft. When was discharge home with PT/OT follow-up. Patient should have follow-up appointment for IV filter removal after 3 months of placement, and ongoing discussion of anticoagulation."    In terms of her renal history, she had the above PORSHA req iHD. Denies known history of nephrolithiasis or hematuria episodes. No reported history of frequent or recurrent use of NSAIDs/COXI. She has a history of seropositive RA- currently not on DMARD. Denies any pertinent family history of known kidney disease, or family members with ESRD requiring dialysis.  Other pertinent Uro/gyn history: denies  Smoking history: quit 18 years ago.  Fluid intake in 24hrs: 1.5L fluid restriction.  Cardiology: Dr. Ibarra    Interval history:  3/27/25 -   She has no uremic or urinary symptoms and is in her usual state of health.    During this visit, the patient and I reviewed her lab trends, discussed CKD epidemiology and risk factors, as well as general lifestyle and risk factor modifications to reduce her risk of progression to ESRD.   Recently saw PCP; bicarb dose reduced; started on " SGLT2-I  On hydralazine/imdur for GDMT of CHF  PORSHA improving from hospitalization still; discussed it may settle out vs continue to improve from prior ATN, time will tell.  Lasix recently increased from 40mg QD to 40mg QD plus 20mg in afternoon  Potassium tablet added.     25  She is here today for f/u. Feeling much better. Appetite is improved. Avoiding dehydration. Driving herself again. Denies BATES other than climbing a flight of stairs. We reviewed recent labs at chairside.  Renal fxn based on sCr appears to have improved further to sCr of 1.4mg/dL      Review of Systems   Constitutional:  Negative for chills, diaphoresis and fever.   Respiratory:  Negative for cough and shortness of breath.    Cardiovascular:  Negative for chest pain and leg swelling.   Gastrointestinal:  Negative for abdominal pain, diarrhea, nausea and vomiting.   Genitourinary:  Negative for difficulty urinating, dysuria and hematuria.   Musculoskeletal:  Negative for myalgias.   Neurological:  Negative for headaches.   Hematological:  Does not bruise/bleed easily.       The past medical, family and social histories were reviewed for this encounter.     Past Medical History:   Diagnosis Date    Diabetes 2012    Hypothyroid 2012    Rheumatoid arthritis 2012     Past Surgical History:   Procedure Laterality Date     SECTION      1    COLONOSCOPY N/A 3/23/2023    Procedure: COLONOSCOPY;  Surgeon: Rinku Olmstead MD;  Location: [facility];  Service: Endoscopy;  Laterality: N/A;    COSMETIC SURGERY      breast augmentation and removal    HERNIA REPAIR      left LUQ incision    SPLENECTOMY, TOTAL      age 43 // main O    TONSILLECTOMY       Social History[1]  Current Outpatient Medications   Medication Sig    amiodarone (PACERONE) 200 MG Tab Take 0.5 tablets (100 mg total) by mouth once daily.    blood sugar diagnostic Strp To check BG 1 times daily, to use with insurance preferred meter    empagliflozin (JARDIANCE) 25 mg  "tablet Take 1 tablet (25 mg total) by mouth once daily.    furosemide (LASIX) 40 MG tablet TAKE 1 TABLET BY MOUTH IN THE MORNING DAILY & 1/2 TABLET AT NOON X 3 DAYS AS NEEDED FOR SWELLING    glimepiride (AMARYL) 2 MG tablet TAKE 1 TABLET (2 MG TOTAL) BY MOUTH BEFORE BREAKFAST    insulin aspart U-100 (NOVOLOG FLEXPEN U-100 INSULIN) 100 unit/mL (3 mL) InPn pen Inject 5 Units into the skin 3 (three) times daily with meals. ((PRN sliding scale -199: 2 units  -249: 4 units  -299: 7 units  -349: 10 units  BG Over 350: 12 units )) Max daily dose 25 units    lancets Misc To check BG 1 times daily, to use with insurance preferred meter    levothyroxine (SYNTHROID) 200 MCG tablet 1 tab po 6 x wkly & 1/2 tab po Sunday    losartan (COZAAR) 25 MG tablet Take 1 tablet (25 mg total) by mouth once daily.    metoprolol succinate (TOPROL-XL) 200 MG 24 hr tablet Take 200 mg by mouth once daily.    multivitamin with folic acid 400 mcg Tab Take 1 tablet by mouth once daily.    pantoprazole (PROTONIX) 40 MG tablet Take 40 mg by mouth daily as needed.    potassium chloride (KLOR-CON) 10 MEQ TbSR TAKE 1 TABLET BY MOUTH ONCE DAILY    pravastatin (PRAVACHOL) 10 MG tablet TAKE 1 TABLET BY MOUTH EVERY DAY    sodium bicarbonate 650 MG tablet Take 650 mg by mouth 3 (three) times daily.    spironolactone (ALDACTONE) 25 MG tablet Take 0.5 tablets (12.5 mg total) by mouth once daily.    traMADoL (ULTRAM) 50 mg tablet Take 1 tablet (50 mg total) by mouth every 12 (twelve) hours as needed for Pain (rheumatoid arthritis).    triamcinolone acetonide 0.1% (KENALOG) 0.1 % ointment Apply topically 2 (two) times daily.     No current facility-administered medications for this visit.         Objective:   /66 (BP Location: Left arm, Patient Position: Sitting)   Pulse 79   Ht 5' 3" (1.6 m)   Wt 76.1 kg (167 lb 12.3 oz)   SpO2 97%   BMI 29.72 kg/m²      Physical Exam  Vitals reviewed.   Constitutional:       General: She is " not in acute distress.     Appearance: Normal appearance.   HENT:      Head: Normocephalic and atraumatic.   Eyes:      General: No scleral icterus.     Extraocular Movements: Extraocular movements intact.   Cardiovascular:      Rate and Rhythm: Normal rate and regular rhythm.      Pulses: Normal pulses.      Heart sounds: Normal heart sounds.      No friction rub.   Pulmonary:      Effort: Pulmonary effort is normal. No respiratory distress.      Breath sounds: Normal breath sounds.   Abdominal:      General: Abdomen is flat.      Palpations: Abdomen is soft.   Musculoskeletal:         General: Swelling present.      Cervical back: Normal range of motion. No tenderness.      Right lower leg: Edema present.      Left lower leg: Edema present.   Neurological:      General: No focal deficit present.      Mental Status: She is oriented to person, place, and time.      Motor: No weakness.   Psychiatric:         Mood and Affect: Mood normal.         Behavior: Behavior normal.         Assessment:     Lab Results   Component Value Date    CREATININE 1.4 05/01/2025    BUN 23 (H) 05/01/2025     05/01/2025    K 3.4 (L) 05/01/2025     05/01/2025    CO2 27 05/01/2025     Lab Results   Component Value Date    .7 (H) 05/01/2025    CALCIUM 9.6 05/01/2025    PHOS 3.7 05/01/2025     Lab Results   Component Value Date    HCT 34.4 (L) 05/01/2025     Urine Protein/Creatinine Ratio   Date Value Ref Range Status   05/01/2025   Final     Comment:     UNABLE TO CALCULATE     Prot/Creat Ratio, Urine   Date Value Ref Range Status   10/24/2014 0.1 0.0 - 0.2 Final   04/01/2014 Unable to calculate 0.0 - 0.2 Final   06/20/2013 UNABLE TO CALCULATE 0.0 - 0.2 Final       Lab Results   Component Value Date    MICALBCREAT  05/01/2025      Comment:      UNABLE TO CALCULATE    MICALBCREAT 94.1 (H) 03/19/2025    MICALBCREAT Unable to calculate 02/06/2024    MICALBCREAT 14.8 10/29/2022    MICALBCREAT Unable to calculate 05/06/2021     MICALBCREAT 15.1 11/21/2018 02/05/2025 RPE U.S.-right kidney 12 cm, left kidney 13.4 cm      No diagnosis found.      Plan:   Return to clinic in 3 months  Labs for next visit include upcr, rfp, pth  Baseline creatinine is 0.7mg/dL prior to prolonged hospital stay, now est 1-1.4mg/dL range.     CKD G3a/b / A1  -noted during recent prolonged hospitalization in February of 2025, briefly required hemodialysis for decompensated renal failure x3 sessions during hospital stay.  Dialysis catheter removed on 02/22/2025.    -good baseline renal function based on historical data trends prior to hospital event.  -on RASI and SGLT2-I for CKD-MACE risk reduction, proteinuria reduction and aguila-protection  -also on MRA  -albuminuria is controlled.  -continue discussion and adjustment of modifiable risk factors. Educated patient on the importance of BP, glycemic and lipid control   -avoid dehydration  -estimated low risk of renal progression based on KFRE model    -continue f/u with rheumatology     -continue f/u with cardiology for CHF. On GDMT currently.     -can stop bicarb tablets. On 975mg / 24hrs w/ CO2 of 27 on most recent draw.     -BP trends reviewed.  Medication list reviewed.  Continue current medications including ARB for now. Increase mary alice to 25mg QD.     -Primary vs Secondary Hyperparathyroidism noted on recent draw; may still be acute stressor. Ca level is borderline elevated. Currently on loop diuretic. Will monitor and repeat for subsequent draw.     -normocytic anemia; iron levels checked last month and acceptable. Following with PCP for this.   __________________________  Wayne Barrientos MD  [facility]    Part of this note has been created using nuMVC dictation system. Errors in transcription may not be completely avoided.    KFRE 2-Year: 1% at 5/1/2025  9:48 AM  Calculated from:  Serum Creatinine: 1.4 mg/dL at 5/1/2025  9:48 AM  Urine Albumin Creatinine Ratio: 94.1 ug/mg at  3/19/2025  8:13 AM  Age: 60 years  Sex: Female at 2025  9:48 AM  Has CKD-3 to CKD-5: No    KFRE 5-Year: 3.1% at 2025  9:48 AM  Calculated from:  Serum Creatinine: 1.4 mg/dL at 2025  9:48 AM  Urine Albumin Creatinine Ratio: 94.1 ug/mg at 3/19/2025  8:13 AM  Age: 60 years  Sex: Female at 2025  9:48 AM  Has CKD-3 to CKD-5: No         [1]   Social History  Socioeconomic History    Marital status:    Tobacco Use    Smoking status: Former     Current packs/day: 0.00     Types: Cigarettes     Quit date: 2009     Years since quittin.2    Smokeless tobacco: Never   Substance and Sexual Activity    Alcohol use: Yes     Comment: occassionally    Drug use: No    Sexual activity: Yes     Social Drivers of Health     Financial Resource Strain: Low Risk  (2025)    Received from [facility]    Overall Financial Resource Strain (CARDIA)     Difficulty of Paying Living Expenses: Not hard at all   Food Insecurity: No Food Insecurity (2025)    Received from [facility]    Hunger Vital Sign     Worried About Running Out of Food in the Last Year: Never true     Ran Out of Food in the Last Year: Never true   Transportation Needs: No Transportation Needs (2025)    Received from [facility]    PRAPARE - Transportation     Lack of Transportation (Medical): No     Lack of Transportation (Non-Medical): No   Physical Activity: Inactive (2025)    Received from [facility]    Exercise Vital Sign     Days of Exercise per Week: 0 days     Minutes of Exercise per Session: 0 min   Stress: No Stress Concern Present (2025)    Received from [facility]    [employer] [place] [employer] - Occupational Stress Questionnaire     Feeling of Stress : Not at all   Housing Stability: Low Risk  (2025)    Received from [facility]    Housing Stability Vital Sign     Unable to Pay for Housing in the Last Year: No     Number of Times Moved in the Last Year: 0     Homeless in the Last Year: No

## 2025-05-16 ENCOUNTER — OFFICE VISIT (OUTPATIENT)
Dept: CARDIOLOGY | Facility: CLINIC | Age: 60
End: 2025-05-16
Payer: COMMERCIAL

## 2025-05-16 VITALS
OXYGEN SATURATION: 93 % | HEART RATE: 73 BPM | WEIGHT: 161.5 LBS | SYSTOLIC BLOOD PRESSURE: 120 MMHG | HEIGHT: 63 IN | BODY MASS INDEX: 28.62 KG/M2 | DIASTOLIC BLOOD PRESSURE: 60 MMHG

## 2025-05-16 DIAGNOSIS — Z95.828 S/P IVC FILTER: ICD-10-CM

## 2025-05-16 DIAGNOSIS — I48.92 PAROXYSMAL ATRIAL FLUTTER: ICD-10-CM

## 2025-05-16 DIAGNOSIS — I50.20 HFREF (HEART FAILURE WITH REDUCED EJECTION FRACTION): Primary | ICD-10-CM

## 2025-05-16 PROCEDURE — 99999 PR PBB SHADOW E&M-EST. PATIENT-LVL IV: CPT | Mod: PBBFAC,,, | Performed by: INTERNAL MEDICINE

## 2025-05-16 RX ORDER — LOSARTAN POTASSIUM 50 MG/1
50 TABLET ORAL DAILY
Qty: 90 TABLET | Refills: 3 | Status: SHIPPED | OUTPATIENT
Start: 2025-05-16 | End: 2026-05-16

## 2025-05-16 NOTE — PROGRESS NOTES
[facility]    Subjective:     Patient ID:  Domi Leary is a 60 y.o. female patient here for evaluation Hypertension, Hyperlipidemia, and Results (Labs & zio)      History of Present Illness    CHIEF COMPLAINT:  Patient presents today for follow-up and to review test results.  She was recently diagnosed with heart failure with severely reduced EF at an outside hospital with atrial flutter.  Please look at my last note for complete details.  She is following today up for uptitration of her goal-directed medical therapy.  She saw electrophysiology in the interim who is waiting on reviewing her records to decide on the exact next steps for her management.    CARDIOVASCULAR HISTORY:  She has a history of heart failure with severely reduced EF and non-ischemic cardiomyopathy. She also has a history of atrial flutter, which she is concerned may be the cause of her cardiac condition. She reports discussing this with Dr. Umanzor for record review. She has an IVC filter placed for a blood clot and expresses concern about potential complications if not removed within the recommended timeframe.    RECENT CARDIAC MONITORING:  Heart monitor demonstrated minimum heart rate of 59, max of 120, and average of 78. She reports occasional PVCs with otherwise normal rhythm.    MEDICAL HISTORY:  She has a history of unidentified bleeding during hospitalization and possible flu-related cardiac complications per Dr. Merida. Labs indicates possible fluid retention and she reports worsening diabetes control.    MEDICATIONS:  Current medications include Lasix 40 mg daily, Jardiance, Losartan, Metoprolol, Pravastatin, and Spironolactone with recent dose increase by nephrology. She denies dizziness or lightheadedness with standing after Spironolactone dose increase.    SOCIAL HISTORY:  She denies significant alcohol use except for occasional use in her twenties. She denies any  history of drug use.      ROS:  ROS is negative unless otherwise indicated in HPI.           ROS     Past Medical History:   Diagnosis Date    Diabetes 2012    Hypothyroid 2012    Rheumatoid arthritis 2012       Past Surgical History:   Procedure Laterality Date     SECTION      1    COLONOSCOPY N/A 3/23/2023    Procedure: COLONOSCOPY;  Surgeon: Rinku Olmstead MD;  Location: [facility];  Service: Endoscopy;  Laterality: N/A;    COSMETIC SURGERY      breast augmentation and removal    HERNIA REPAIR      left LUQ incision    SPLENECTOMY, TOTAL      age 43 // main O    TONSILLECTOMY         Family History   Problem Relation Name Age of Onset    No Known Problems Mother      Ulcerative colitis Father      Cataracts Paternal Grandmother      Diabetes Maternal Aunt      Breast cancer Maternal Aunt      Glaucoma Neg Hx      Macular degeneration Neg Hx      Retinal detachment Neg Hx         Social History     Socioeconomic History    Marital status:    Tobacco Use    Smoking status: Former     Current packs/day: 0.00     Types: Cigarettes     Quit date: 2009     Years since quittin.2    Smokeless tobacco: Never   Substance and Sexual Activity    Alcohol use: Yes     Comment: occassionally    Drug use: No    Sexual activity: Yes     Social Drivers of Health     Financial Resource Strain: Low Risk  (2025)    Received from [facility]    Overall Financial Resource Strain (CARDIA)     Difficulty of Paying Living Expenses: Not hard at all   Food Insecurity: No Food Insecurity (2025)    Received from [facility]    Hunger Vital Sign     Worried About Running Out of Food in the Last Year: Never true     Ran Out of Food in the Last Year: Never true   Transportation Needs: No Transportation Needs (2025)    Received from [facility]    PRAPARE - Transportation     Lack of Transportation (Medical): No     Lack of Transportation (Non-Medical): No   Physical Activity: Inactive  (2/5/2025)    Received from [facility]    Exercise Vital Sign     Days of Exercise per Week: 0 days     Minutes of Exercise per Session: 0 min   Stress: No Stress Concern Present (2/5/2025)    Received from [facility]    [employer] [place] [employer] - Occupational Stress Questionnaire     Feeling of Stress : Not at all   Housing Stability: Low Risk  (2/5/2025)    Received from [facility]    Housing Stability Vital Sign     Unable to Pay for Housing in the Last Year: No     Number of Times Moved in the Last Year: 0     Homeless in the Last Year: No       Current Medications[1]    Review of patient's allergies indicates:   Allergen Reactions    Enbrel [etanercept] Rash     psoriasis    Amoxil [amoxicillin] Hives         Objective:        Vitals:    05/16/25 0900   BP: 120/60   Pulse: 73       Physical Exam  Vitals reviewed.   Constitutional:       Appearance: Normal appearance.   Cardiovascular:      Rate and Rhythm: Normal rate and regular rhythm.      Pulses: Normal pulses.      Heart sounds: Normal heart sounds. No murmur heard.     No gallop.   Pulmonary:      Effort: Pulmonary effort is normal.   Skin:     General: Skin is warm.   Neurological:      General: No focal deficit present.      Mental Status: She is alert and oriented to person, place, and time.         LIPIDS - LAST 2   Lab Results   Component Value Date    CHOL 133 05/01/2025    CHOL 143 03/19/2025    HDL 48 05/01/2025    HDL 61 03/19/2025    LDLCALC 72.2 05/01/2025    LDLCALC 63.0 03/19/2025    TRIG 64 05/01/2025    TRIG 95 03/19/2025    CHOLHDL 36.1 05/01/2025    CHOLHDL 42.7 03/19/2025       CBC - LAST 2  Lab Results   Component Value Date    WBC 12.73 (H) 05/01/2025    WBC 12.10 03/19/2025    RBC 3.58 (L) 05/01/2025    RBC 3.49 (L) 03/19/2025    HGB 10.8 (L) 05/01/2025    HGB 10.5 (L) 03/19/2025    HCT 34.4 (L) 05/01/2025    HCT 35.3 (L) 03/19/2025    MCV 96 05/01/2025     (H) 03/19/2025    MCH 30.2 05/01/2025    MCH 30.1  03/19/2025    MCHC 31.4 (L) 05/01/2025    MCHC 29.7 (L) 03/19/2025    RDW 14.9 (H) 05/01/2025    RDW 19.4 (H) 03/19/2025     05/01/2025     03/19/2025    MPV 11.1 05/01/2025    MPV 12.0 03/19/2025    GRAN 3.9 05/13/2024    GRAN 39.6 05/13/2024    LYMPH 28.5 05/01/2025    LYMPH 3.63 05/01/2025    MONO 11.9 05/01/2025    MONO 1.52 (H) 05/01/2025    BASO 143 10/25/2024    BASO 0.16 05/13/2024    NRBC 0 05/01/2025    NRBC 0 05/13/2024       CHEMISTRY & LIVER FUNCTION - LAST 2  Lab Results   Component Value Date     05/01/2025     03/31/2025    K 3.4 (L) 05/01/2025    K 3.0 (L) 03/31/2025     05/01/2025    CL 95 03/31/2025    CO2 27 05/01/2025    CO2 33 (H) 03/31/2025    ANIONGAP 11 05/01/2025    ANIONGAP 8 03/31/2025    BUN 23 (H) 05/01/2025    BUN 25 (H) 03/31/2025    CREATININE 1.4 05/01/2025    CREATININE 1.7 (H) 03/31/2025    GLU 90 05/01/2025     (H) 03/31/2025    CALCIUM 9.6 05/01/2025    CALCIUM 8.1 (L) 03/31/2025    PH 5.0 02/20/2025    PH 5.5 02/05/2025    MG 1.9 05/01/2025    ALBUMIN 2.8 (L) 05/01/2025    ALBUMIN 2.6 (L) 03/31/2025    PROT 7.2 03/31/2025    PROT 7.3 03/19/2025    ALKPHOS 137 03/31/2025    ALKPHOS 154 (H) 03/19/2025    ALT 24 05/01/2025    ALT 12 03/31/2025    AST 20 03/31/2025    AST 20 03/19/2025    BILITOT 0.7 03/31/2025    BILITOT 1.0 03/19/2025        CARDIAC PROFILE - LAST 2  Lab Results   Component Value Date     (H) 05/01/2025     (H) 03/31/2025    CPK 1,569 (H) 02/06/2025    CPK 2,225 (H) 02/06/2025     06/23/2011     04/28/2010        COAGULATION - LAST 2  Lab Results   Component Value Date    INR 1.2 02/14/2025    INR 1.3 (H) 02/13/2025    APTT 34.9 (H) 02/05/2025    APTT 35.3 (H) 02/05/2025       ENDOCRINE & PSA - LAST 2  Lab Results   Component Value Date    HGBA1C 9.4 (H) 05/01/2025    HGBA1C 7.9 (H) 02/06/2025    TSH 0.090 (L) 04/24/2025    TSH 46.696 (H) 03/19/2025        ECHOCARDIOGRAM RESULTS  No results found  for this or any previous visit.      CURRENT/PREVIOUS VISIT EKG  Results for orders placed or performed in visit on 04/02/25   IN OFFICE EKG 12-LEAD (to [place])    Collection Time: 04/02/25  8:06 AM   Result Value Ref Range    QRS Duration 114 ms    [facility] QTC Calculation 493 ms    Narrative    Test Reason : I50.20,I48.92,    Vent. Rate :  86 BPM     Atrial Rate :  86 BPM     P-R Int : 212 ms          QRS Dur : 114 ms      QT Int : 412 ms       P-R-T Axes :  69 -38 129 degrees    QTcB Int : 493 ms    Sinus rhythm with 1st degree A-V block  Possible Left atrial enlargement  Left axis deviation  Anterior infarct ,age undetermined  Abnormal ECG  No previous ECGs available  Confirmed by Carlton Smith ([id number]) on 5/13/2025 10:50:49 AM    Referred By: JAYESH GRANT           Confirmed By: Carlton Smith     No valid procedures specified.   No results found for this or any previous visit.    No valid procedures specified.        Assessment:        Assessment & Plan    60-year-old female with nonischemic cardiomyopathy and heart failure.  Appears to be getting better.  Tolerating losartan without any renal issues or hypotension.  She also has an IVC filter, will assess her at next visit to planned removal of it.    PLAN SUMMARY:  - Increased losartan from 25 mg to 50 mg daily  - Continued Jardiance, pravastatin, Lasix (furosemide), metoprolol, and spironolactone at current doses  - Ordered labs to check renal function in 5-7 days  - Dr. Umanzor decreased amiodarone  - Follow up in 2 weeks to assess blood pressure, heart rate, and renal function    HEART FAILURE:  - Increased losartan from 25 mg to 50 mg daily, aiming for maximum dose of 100 mg for optimal heart failure management.  - Need to monitor renal function closely due to combination of spironolactone, diuretics, and losartan.  - If EF remains below 35% after maximizing heart failure medications, patient may need a defibrillator.  - Discussed the importance of  losartan for heart failure management and its impact on prognosis.  - Explained the criteria for defibrillator placement (EF < 35%) and that it is a day procedure similar to a pacemaker.  - Patient instructed to take two 25 mg tablets to make 50 mg dose until new prescription for 50 mg tablets is filled.  - Educated on potential side effects of increased losartan, particularly dizziness and lightheadedness upon standing.  - Continued metoprolol and spironolactone at current doses.    ATRIAL FIBRILLATION:  - Dr. Umanzor (heart rhythm specialist) decreased amiodarone and plans to revisit treatment after reviewing records.    EDEMA:  - Labs indicates possible fluid retention and worsening diabetes.    COAGULATION DISORDER:  - Considered removal of IVC filter, but not urgent.    HYPERLIPIDEMIA:  - Continued pravastatin at current dose.    FOLLOW-UP PLAN:  - Ordered labs to check renal function, to be done 5-7 days after increasing losartan dose, prior to follow-up visit.  - Follow up in 2 weeks to assess blood pressure, heart rate, and renal function.        1. HFrEF (heart failure with reduced ejection fraction)    2. S/P IVC filter    3. Paroxysmal atrial flutter           Plan:       HFrEF (heart failure with reduced ejection fraction)  -     losartan (COZAAR) 50 MG tablet; Take 1 tablet (50 mg total) by mouth once daily.  Dispense: 90 tablet; Refill: 3  -     Basic metabolic panel; Future; Expected date: 05/16/2025    S/P IVC filter    Paroxysmal atrial flutter      Follow up in about 2 weeks (around 5/30/2025) for f/u HFrEF and Losartan.        This note was generated with the assistance of ambient listening technology. Verbal consent was obtained by the patient and accompanying visitor(s) for the recording of patient appointment to facilitate this note. I attest to having reviewed and edited the generated note for accuracy, though some syntax or spelling errors may persist. Please contact the author of this note for  any clarification.      [facility] [place] [facility] [place]         [1]   Current Outpatient Medications   Medication Sig Dispense Refill    amiodarone (PACERONE) 200 MG Tab Take 0.5 tablets (100 mg total) by mouth once daily. 90 tablet 3    blood sugar diagnostic Strp To check BG 1 times daily, to use with insurance preferred meter 100 strip 12    empagliflozin (JARDIANCE) 25 mg tablet Take 1 tablet (25 mg total) by mouth once daily. 30 tablet 5    furosemide (LASIX) 40 MG tablet TAKE 1 TABLET BY MOUTH IN THE MORNING DAILY & 1/2 TABLET AT NOON X 3 DAYS AS NEEDED FOR SWELLING 135 tablet 0    glimepiride (AMARYL) 2 MG tablet TAKE 1 TABLET (2 MG TOTAL) BY MOUTH BEFORE BREAKFAST 90 tablet 1    insulin aspart U-100 (NOVOLOG FLEXPEN U-100 INSULIN) 100 unit/mL (3 mL) InPn pen Inject 5 Units into the skin 3 (three) times daily with meals. ((PRN sliding scale -199: 2 units  -249: 4 units  -299: 7 units  -349: 10 units  BG Over 350: 12 units )) Max daily dose 25 units 1 each 1    lancets Misc To check BG 1 times daily, to use with insurance preferred meter 100 each 12    levothyroxine (SYNTHROID) 200 MCG tablet 1 tab po 6 x wkly & 1/2 tab po Sunday 78 tablet 2    metoprolol succinate (TOPROL-XL) 200 MG 24 hr tablet Take 200 mg by mouth once daily.      multivitamin with folic acid 400 mcg Tab Take 1 tablet by mouth once daily.      pantoprazole (PROTONIX) 40 MG tablet Take 40 mg by mouth daily as needed.      potassium chloride (KLOR-CON) 10 MEQ TbSR TAKE 1 TABLET BY MOUTH ONCE DAILY 90 tablet 3    pravastatin (PRAVACHOL) 10 MG tablet TAKE 1 TABLET BY MOUTH EVERY DAY 90 tablet 2    spironolactone (ALDACTONE) 25 MG tablet Take 1 tablet (25 mg total) by mouth once daily. 30 tablet 11    traMADoL (ULTRAM) 50 mg tablet Take 1 tablet (50 mg total) by mouth every 12 (twelve) hours as needed for Pain (rheumatoid arthritis). 60 tablet 3    triamcinolone  acetonide 0.1% (KENALOG) 0.1 % ointment Apply topically 2 (two) times daily. 453 g 0    losartan (COZAAR) 50 MG tablet Take 1 tablet (50 mg total) by mouth once daily. 90 tablet 3     No current facility-administered medications for this visit.

## 2025-05-21 ENCOUNTER — EXTERNAL HOME HEALTH (OUTPATIENT)
Dept: HOME HEALTH SERVICES | Facility: HOSPITAL | Age: 60
End: 2025-05-21
Payer: COMMERCIAL

## 2025-05-21 ENCOUNTER — OFFICE VISIT (OUTPATIENT)
Dept: RHEUMATOLOGY | Facility: CLINIC | Age: 60
End: 2025-05-21
Payer: COMMERCIAL

## 2025-05-21 VITALS
BODY MASS INDEX: 28.56 KG/M2 | WEIGHT: 161.19 LBS | SYSTOLIC BLOOD PRESSURE: 114 MMHG | HEART RATE: 73 BPM | HEIGHT: 63 IN | DIASTOLIC BLOOD PRESSURE: 73 MMHG

## 2025-05-21 DIAGNOSIS — M05.79 SEROPOSITIVE RHEUMATOID ARTHRITIS OF MULTIPLE SITES: Primary | ICD-10-CM

## 2025-05-21 DIAGNOSIS — Z90.81 ASPLENIA AFTER SURGICAL PROCEDURE: ICD-10-CM

## 2025-05-21 PROCEDURE — 1159F MED LIST DOCD IN RCRD: CPT | Mod: CPTII,S$GLB,, | Performed by: INTERNAL MEDICINE

## 2025-05-21 PROCEDURE — 3008F BODY MASS INDEX DOCD: CPT | Mod: CPTII,S$GLB,, | Performed by: INTERNAL MEDICINE

## 2025-05-21 PROCEDURE — 1160F RVW MEDS BY RX/DR IN RCRD: CPT | Mod: CPTII,S$GLB,, | Performed by: INTERNAL MEDICINE

## 2025-05-21 PROCEDURE — 3061F NEG MICROALBUMINURIA REV: CPT | Mod: CPTII,S$GLB,, | Performed by: INTERNAL MEDICINE

## 2025-05-21 PROCEDURE — 99214 OFFICE O/P EST MOD 30 MIN: CPT | Mod: S$GLB,,, | Performed by: INTERNAL MEDICINE

## 2025-05-21 PROCEDURE — 99999 PR PBB SHADOW E&M-EST. PATIENT-LVL III: CPT | Mod: PBBFAC,,, | Performed by: INTERNAL MEDICINE

## 2025-05-21 PROCEDURE — 4010F ACE/ARB THERAPY RXD/TAKEN: CPT | Mod: CPTII,S$GLB,, | Performed by: INTERNAL MEDICINE

## 2025-05-21 PROCEDURE — 3046F HEMOGLOBIN A1C LEVEL >9.0%: CPT | Mod: CPTII,S$GLB,, | Performed by: INTERNAL MEDICINE

## 2025-05-21 PROCEDURE — 3066F NEPHROPATHY DOC TX: CPT | Mod: CPTII,S$GLB,, | Performed by: INTERNAL MEDICINE

## 2025-05-21 PROCEDURE — 3078F DIAST BP <80 MM HG: CPT | Mod: CPTII,S$GLB,, | Performed by: INTERNAL MEDICINE

## 2025-05-21 PROCEDURE — 3074F SYST BP LT 130 MM HG: CPT | Mod: CPTII,S$GLB,, | Performed by: INTERNAL MEDICINE

## 2025-05-21 ASSESSMENT — ROUTINE ASSESSMENT OF PATIENT INDEX DATA (RAPID3)
PSYCHOLOGICAL DISTRESS SCORE: 1.1
FATIGUE SCORE: 1.1
TOTAL RAPID3 SCORE: 3.11
PATIENT GLOBAL ASSESSMENT SCORE: 3
PAIN SCORE: 5
MDHAQ FUNCTION SCORE: 0.4

## 2025-05-21 NOTE — PROGRESS NOTES
Subjective:          Chief Complaint: Domi Leary is a 60 y.o. female who had concerns including Disease Management.    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  Unfortunately by 2009 her arthritis flaring significantly    Doing well overall still with stiffness and loss of ROM at the right 2nd MCP but continues to be active.   Psoriasis on hand is back, only other episode with Enbrel in past. Using OTC hydrocortisone.     5/2025: Patient still doing well. Slowly tolerating more activity. No further arrhythmia decreased amiodarone. Remains off HCQ and LFA at this time w/o any swelling of her joints.  She denies significant SOB.       3/2025: patient is OFF al DMARD therapy since:   Was hospitalized February of 2025 influenza A.  Pt. Presented w shortness of breath productive cough vomiting inability tolerate food she was in the ICU for multiorgan dysfunction and sepsis.  Markedly elevated liver enzymes, iatrogenic hyperthyroidism.  She an EF of 10-15% found to be cardiogenic.  Her renal function significantly worsened.  She received dialysis x3 days, patient had a retroperitoneal bleed.  She had an IVC filter placed on 02/19 due to a right superficial thrombus.  V/Q scan did show multiple sub segmental defects and lower lobes of the lungs bilaterally. EF upon DC not normalized last ECHO 15-20%, renal function    Patient is DMARDs were discontinued including hydroxychloroquine and leflunomide given her status and have continued.   Joints today are fine, mild aches but no wrist or MCP swelling.     Currently on HOLD:   LFA 20mg daily  HCQ 200mg BID (overdue for eye exam)     AM stiffness: <30 min   Worst for her is at night sitting still  Some dry  eye, no dry mouth  Previous medications    Enbrel: diffuse psoriasis after starting Enbrel. Resolved with holding.       Component      Latest Ref Rng & Units 2018   PIA      Neg <1:160  Negative   CCP Antibodies      <5.0 U/mL  637.7   CRP      0.0 - 8.2 mg/L 12.8 (H) 32.2 (H)   Sed Rate      0 - 20 mm/Hr 12 23 (H)   Rheumatoid Factor      0 - 15 IU/ml  157 (H)     REVIEW OF SYSTEMS:    Review of Systems   Constitutional:  Negative for fever, malaise/fatigue and weight loss.   HENT:  Negative for sore throat.    Eyes:  Negative for double vision, photophobia and redness.   Respiratory:  Negative for cough, shortness of breath and wheezing.    Cardiovascular:  Negative for chest pain, palpitations and orthopnea.   Gastrointestinal:  Negative for abdominal pain, constipation and diarrhea.   Genitourinary:  Negative for dysuria, hematuria and urgency.   Musculoskeletal:  Positive for joint pain. Negative for back pain and myalgias.   Skin:  Negative for rash.   Neurological:  Negative for dizziness, tingling, focal weakness and headaches.   Endo/Heme/Allergies:  Does not bruise/bleed easily.   Psychiatric/Behavioral:  Negative for depression, hallucinations and suicidal ideas.                Objective:            Past Medical History:   Diagnosis Date    Diabetes 2012    Hypothyroid 2012    Rheumatoid arthritis 2012     Family History   Problem Relation Name Age of Onset    No Known Problems Mother      Ulcerative colitis Father      Cataracts Paternal Grandmother      Diabetes Maternal Aunt      Breast cancer Maternal Aunt      Glaucoma Neg Hx      Macular degeneration Neg Hx      Retinal detachment Neg Hx       Social History     Tobacco Use    Smoking status: Former     Current packs/day: 0.00     Types: Cigarettes     Quit date: 2009     Years since quittin.2    Smokeless tobacco: Never   Substance Use Topics    Alcohol use: Yes     Comment: occassionally    Drug use: No          Current Outpatient Medications on File Prior to Visit   Medication Sig Dispense Refill    amiodarone (PACERONE) 200 MG Tab Take 0.5 tablets (100 mg total) by mouth once daily. 90 tablet 3    blood sugar diagnostic Strp To check BG 1 times daily, to use with insurance preferred meter 100 strip 12    empagliflozin (JARDIANCE) 25 mg tablet Take 1 tablet (25 mg total) by mouth once daily. 30 tablet 5    furosemide (LASIX) 40 MG tablet TAKE 1 TABLET BY MOUTH IN THE MORNING DAILY & 1/2 TABLET AT NOON X 3 DAYS AS NEEDED FOR SWELLING 135 tablet 0    glimepiride (AMARYL) 2 MG tablet TAKE 1 TABLET (2 MG TOTAL) BY MOUTH BEFORE BREAKFAST 90 tablet 1    insulin aspart U-100 (NOVOLOG FLEXPEN U-100 INSULIN) 100 unit/mL (3 mL) InPn pen Inject 5 Units into the skin 3 (three) times daily with meals. ((PRN sliding scale -199: 2 units  -249: 4 units  -299: 7 units  -349: 10 units  BG Over 350: 12 units )) Max daily dose 25 units 1 each 1    lancets Misc To check BG 1 times daily, to use with insurance preferred meter 100 each 12    levothyroxine (SYNTHROID) 200 MCG tablet 1 tab po 6 x wkly & 1/2 tab po Sunday 78 tablet 2    losartan (COZAAR) 50 MG tablet Take 1 tablet (50 mg total) by mouth once daily. 90 tablet 3    metoprolol succinate (TOPROL-XL) 200 MG 24 hr tablet Take 200 mg by mouth once daily.      multivitamin with folic acid 400 mcg Tab Take 1 tablet by mouth once daily.      pantoprazole (PROTONIX) 40 MG tablet Take 40 mg by mouth daily as needed.      potassium chloride (KLOR-CON) 10 MEQ TbSR TAKE 1 TABLET BY MOUTH ONCE DAILY 90 tablet 3    pravastatin (PRAVACHOL) 10 MG tablet TAKE 1 TABLET BY MOUTH EVERY DAY 90 tablet 2    spironolactone (ALDACTONE) 25 MG tablet Take 1 tablet (25 mg total) by mouth once daily. 30 tablet 11    traMADoL (ULTRAM) 50 mg tablet Take 1 tablet (50 mg total) by mouth every 12 (twelve) hours as needed for Pain (rheumatoid arthritis). 60 tablet 3    triamcinolone  acetonide 0.1% (KENALOG) 0.1 % ointment Apply topically 2 (two) times daily. 453 g 0     No current facility-administered medications on file prior to visit.       Vitals:    05/21/25 1529   BP: 114/73   Pulse: 73       Physical Exam:    Physical Exam  Constitutional:       Appearance: She is well-developed.   HENT:      Head: Normocephalic and atraumatic.   Eyes:      Pupils: Pupils are equal, round, and reactive to light.   Cardiovascular:      Rate and Rhythm: Normal rate and regular rhythm.      Heart sounds: Normal heart sounds.   Pulmonary:      Effort: Pulmonary effort is normal.      Breath sounds: Normal breath sounds.   Musculoskeletal:      Right shoulder: No swelling or tenderness. Normal range of motion.      Left shoulder: No swelling or tenderness. Normal range of motion.      Right elbow: No swelling. Normal range of motion. No tenderness.      Left elbow: No swelling. Normal range of motion. No tenderness.      Right wrist: No swelling or tenderness. Normal range of motion.      Left wrist: No swelling or tenderness. Normal range of motion.      Right hand: Swelling and tenderness present. Normal range of motion.      Left hand: Swelling and tenderness present. Normal range of motion.      Cervical back: Normal range of motion.      Right knee: No swelling. Normal range of motion. No tenderness.      Left knee: No swelling. Normal range of motion. No tenderness.      Right foot: Normal range of motion. No swelling or tenderness.      Left foot: Normal range of motion. No swelling or tenderness.      Comments: Right 1MCP no synovitis   Left 5th MCP synovitis interdigital space. Mild TTP.   Early swan neck deformity.     + finkelstein's right wrist.      Skin:     General: Skin is warm and dry.   Neurological:      Mental Status: She is alert and oriented to person, place, and time.   Psychiatric:         Behavior: Behavior normal.       Component      Latest Ref Rng & Units 8/8/2018   Sed Rate      0  - 20 mm/Hr 15   CRP      0.0 - 8.2 mg/L 10.0 (H)   Rheumatoid Factor      0.0 - 15.0 IU/mL 85.0 (H)   CCP Antibodies      <5.0 U/mL 646.5 (H)           Assessment:       Encounter Diagnoses   Name Primary?    Seropositive rheumatoid arthritis of multiple sites Yes    Asplenia after surgical procedure            Plan:        Seropositive rheumatoid arthritis of multiple sites    Asplenia after surgical procedure          1-4 Very pleasant 60 y/o female with hx of sero+ RA and felty's s/p splenectomy   -I agree to continue to hold leflunomide and HCQ during her recovery.    -reviewed LFA for immunosuppression.    -reviewed HCQ for updated ECHO and arrhythmias. Did well with holter no new arrhythmias, will have ECHO soon but still adjusting meds. I have no clear c/I for HCQ and that would be my first choice for med to restart unless Cardio recommends otherwise.      -she will call if any flare and we will monitor her closely, may use low dose prednisone until fully recovered.   Reviewed all hospital records, consults and labs during recent hospitalization.     Doing well will continue to hold her RA meds for now until we absolutely need to restart.     No follow-ups on file.         60min consultation with greater than 50% of that time included Preparing to see the patient (review records, tests), Obtaining and/or reviewing separately obtained historical data, Performing a medically appropriate examination and/or evaluation , Ordering medications, tests, and/or procedures, Referring and communicating with other healthcare professionals , Documenting clinical information in the electronic or other health record and Independently interpreting results  (as warranted) & communicating results to the patient/family/caregiver. All questions answered.

## 2025-05-22 DIAGNOSIS — E11.69 DM TYPE 2 WITH DIABETIC MIXED HYPERLIPIDEMIA: Primary | ICD-10-CM

## 2025-05-22 DIAGNOSIS — E78.2 DM TYPE 2 WITH DIABETIC MIXED HYPERLIPIDEMIA: Primary | ICD-10-CM

## 2025-05-22 DIAGNOSIS — E11.40 TYPE 2 DIABETES MELLITUS WITH DIABETIC NEUROPATHY, WITHOUT LONG-TERM CURRENT USE OF INSULIN: ICD-10-CM

## 2025-05-22 RX ORDER — PRAVASTATIN SODIUM 10 MG/1
10 TABLET ORAL
Qty: 90 TABLET | Refills: 3 | Status: SHIPPED | OUTPATIENT
Start: 2025-05-22 | End: 2025-05-23 | Stop reason: SDUPTHER

## 2025-05-22 NOTE — TELEPHONE ENCOUNTER
Refill Routing Note   Medication(s) are not appropriate for processing by [facility] for the following reason(s):        Drug-disease interactionpravastatin and PORSHA (acute kidney injury)     [facility] action(s):  Defer             Pharmacist review requested: Yes     Appointments  past 12m or future 3m with PCP    Date Provider   Last Visit   4/30/2025 Rosales Boles MD   Next Visit   7/17/2025 Rosales Boles MD   ED visits in past 90 days: 0        Note composed:4:00 PM 05/22/2025

## 2025-05-22 NOTE — TELEPHONE ENCOUNTER
Refill Decision Note   Domi Leary  is requesting a refill authorization.  Brief Assessment and Rationale for Refill:  Approve     Medication Therapy Plan:        Pharmacist review requested: Yes   Extended chart review required: Yes   Comments:     Note composed:5:02 PM 05/22/2025

## 2025-05-22 NOTE — TELEPHONE ENCOUNTER
No care due was identified.  [facility] Embedded Care Due Messages. Reference number: [id number].   5/22/2025 3:57:05 PM CDT

## 2025-05-23 ENCOUNTER — LAB VISIT (OUTPATIENT)
Dept: LAB | Facility: HOSPITAL | Age: 60
End: 2025-05-23
Attending: INTERNAL MEDICINE
Payer: COMMERCIAL

## 2025-05-23 ENCOUNTER — TELEPHONE (OUTPATIENT)
Dept: FAMILY MEDICINE | Facility: CLINIC | Age: 60
End: 2025-05-23
Payer: COMMERCIAL

## 2025-05-23 DIAGNOSIS — I50.20 HFREF (HEART FAILURE WITH REDUCED EJECTION FRACTION): ICD-10-CM

## 2025-05-23 LAB
ANION GAP (OHS): 11 MMOL/L (ref 8–16)
BUN SERPL-MCNC: 58 MG/DL (ref 6–20)
CALCIUM SERPL-MCNC: 9.7 MG/DL (ref 8.7–10.5)
CHLORIDE SERPL-SCNC: 97 MMOL/L (ref 95–110)
CO2 SERPL-SCNC: 26 MMOL/L (ref 23–29)
CREAT SERPL-MCNC: 2.2 MG/DL (ref 0.5–1.4)
GFR SERPLBLD CREATININE-BSD FMLA CKD-EPI: 25 ML/MIN/1.73/M2
GLUCOSE SERPL-MCNC: 320 MG/DL (ref 70–110)
POTASSIUM SERPL-SCNC: 5 MMOL/L (ref 3.5–5.1)
SODIUM SERPL-SCNC: 134 MMOL/L (ref 136–145)

## 2025-05-23 PROCEDURE — 36415 COLL VENOUS BLD VENIPUNCTURE: CPT | Mod: PN

## 2025-05-23 PROCEDURE — 80048 BASIC METABOLIC PNL TOTAL CA: CPT

## 2025-05-23 RX ORDER — PRAVASTATIN SODIUM 10 MG/1
10 TABLET ORAL DAILY
Qty: 90 TABLET | Refills: 2 | Status: SHIPPED | OUTPATIENT
Start: 2025-05-23

## 2025-05-23 NOTE — TELEPHONE ENCOUNTER
----- Message from Magaly sent at 5/23/2025  4:06 PM CDT -----  Regarding: Needs return call  Type: Needs Medical AdviceWho Called:  [facility] Best Call Back Number: [phone] Information: Went and saw patient, pt told her she started taking medication for hives, allegra and benadryl as needed, she asked if she was taking both together and she said yes. Allison wants to clarify she is supposed to be doing that. Please call to advise

## 2025-05-23 NOTE — TELEPHONE ENCOUNTER
----- Message from Magaly sent at 5/23/2025  4:06 PM CDT -----  Regarding: Needs return call  Type: Needs Medical AdviceWho Called:  [facility] Best Call Back Number: [phone] Information: Went and saw patient, pt told her she started taking medication for hives, allegra and benadryl as needed, she asked if she was taking both together and she said yes. Allison wants to clarify she is supposed to be doing that. Please call to advise

## 2025-05-23 NOTE — TELEPHONE ENCOUNTER
Spoke w/ [facility] RN, advised per Dr. Boles patient is okay to take both allegra & benedryl as needed for hives/allergies.

## 2025-05-24 ENCOUNTER — RESULTS FOLLOW-UP (OUTPATIENT)
Dept: CARDIOLOGY | Facility: HOSPITAL | Age: 60
End: 2025-05-24

## 2025-05-24 DIAGNOSIS — N17.9 AKI (ACUTE KIDNEY INJURY): Primary | ICD-10-CM

## 2025-05-24 NOTE — PROGRESS NOTES
Please inform patient that her blood work is abnormal showing signs of kidney injury.  I would like her to repeat the same blood work the day before the next appointment and we will look at the blood work together on the day of the appointment and make further decisions regarding the same

## 2025-05-27 ENCOUNTER — LAB VISIT (OUTPATIENT)
Dept: LAB | Facility: HOSPITAL | Age: 60
End: 2025-05-27
Payer: COMMERCIAL

## 2025-05-27 DIAGNOSIS — N17.9 AKI (ACUTE KIDNEY INJURY): ICD-10-CM

## 2025-05-27 LAB
ANION GAP (OHS): 16 MMOL/L (ref 8–16)
BUN SERPL-MCNC: 56 MG/DL (ref 6–20)
CALCIUM SERPL-MCNC: 9.6 MG/DL (ref 8.7–10.5)
CHLORIDE SERPL-SCNC: 100 MMOL/L (ref 95–110)
CO2 SERPL-SCNC: 24 MMOL/L (ref 23–29)
CREAT SERPL-MCNC: 2.4 MG/DL (ref 0.5–1.4)
GFR SERPLBLD CREATININE-BSD FMLA CKD-EPI: 23 ML/MIN/1.73/M2
GLUCOSE SERPL-MCNC: 198 MG/DL (ref 70–110)
POTASSIUM SERPL-SCNC: 5 MMOL/L (ref 3.5–5.1)
SODIUM SERPL-SCNC: 140 MMOL/L (ref 136–145)

## 2025-05-27 PROCEDURE — 36415 COLL VENOUS BLD VENIPUNCTURE: CPT | Mod: PN

## 2025-05-27 PROCEDURE — 80048 BASIC METABOLIC PNL TOTAL CA: CPT

## 2025-05-28 ENCOUNTER — OFFICE VISIT (OUTPATIENT)
Dept: CARDIOLOGY | Facility: CLINIC | Age: 60
End: 2025-05-28
Payer: COMMERCIAL

## 2025-05-28 ENCOUNTER — TELEPHONE (OUTPATIENT)
Dept: TRANSPLANT | Facility: CLINIC | Age: 60
End: 2025-05-28

## 2025-05-28 VITALS
SYSTOLIC BLOOD PRESSURE: 118 MMHG | DIASTOLIC BLOOD PRESSURE: 62 MMHG | OXYGEN SATURATION: 95 % | HEIGHT: 63 IN | WEIGHT: 164.69 LBS | BODY MASS INDEX: 29.18 KG/M2 | HEART RATE: 62 BPM

## 2025-05-28 DIAGNOSIS — Z95.828 S/P IVC FILTER: ICD-10-CM

## 2025-05-28 DIAGNOSIS — I50.20 HFREF (HEART FAILURE WITH REDUCED EJECTION FRACTION): ICD-10-CM

## 2025-05-28 DIAGNOSIS — I42.8 NON-ISCHEMIC CARDIOMYOPATHY: ICD-10-CM

## 2025-05-28 DIAGNOSIS — I50.9 CONGESTIVE HEART FAILURE, UNSPECIFIED HF CHRONICITY, UNSPECIFIED HEART FAILURE TYPE: Primary | ICD-10-CM

## 2025-05-28 DIAGNOSIS — N17.9 AKI (ACUTE KIDNEY INJURY): Primary | ICD-10-CM

## 2025-05-28 PROCEDURE — 3061F NEG MICROALBUMINURIA REV: CPT | Mod: CPTII,S$GLB,, | Performed by: INTERNAL MEDICINE

## 2025-05-28 PROCEDURE — 3066F NEPHROPATHY DOC TX: CPT | Mod: CPTII,S$GLB,, | Performed by: INTERNAL MEDICINE

## 2025-05-28 PROCEDURE — 3078F DIAST BP <80 MM HG: CPT | Mod: CPTII,S$GLB,, | Performed by: INTERNAL MEDICINE

## 2025-05-28 PROCEDURE — 1159F MED LIST DOCD IN RCRD: CPT | Mod: CPTII,S$GLB,, | Performed by: INTERNAL MEDICINE

## 2025-05-28 PROCEDURE — 3046F HEMOGLOBIN A1C LEVEL >9.0%: CPT | Mod: CPTII,S$GLB,, | Performed by: INTERNAL MEDICINE

## 2025-05-28 PROCEDURE — 4010F ACE/ARB THERAPY RXD/TAKEN: CPT | Mod: CPTII,S$GLB,, | Performed by: INTERNAL MEDICINE

## 2025-05-28 PROCEDURE — 3008F BODY MASS INDEX DOCD: CPT | Mod: CPTII,S$GLB,, | Performed by: INTERNAL MEDICINE

## 2025-05-28 PROCEDURE — 99999 PR PBB SHADOW E&M-EST. PATIENT-LVL IV: CPT | Mod: PBBFAC,,, | Performed by: INTERNAL MEDICINE

## 2025-05-28 PROCEDURE — 3074F SYST BP LT 130 MM HG: CPT | Mod: CPTII,S$GLB,, | Performed by: INTERNAL MEDICINE

## 2025-05-28 PROCEDURE — 99214 OFFICE O/P EST MOD 30 MIN: CPT | Mod: S$GLB,,, | Performed by: INTERNAL MEDICINE

## 2025-05-28 RX ORDER — LOSARTAN POTASSIUM 25 MG/1
25 TABLET ORAL DAILY
Qty: 90 TABLET | Refills: 3 | Status: SHIPPED | OUTPATIENT
Start: 2025-05-28 | End: 2026-05-28

## 2025-05-28 NOTE — PROGRESS NOTES
[facility]    Subjective:     Patient ID:  Domi Leary is a 60 y.o. female patient here for evaluation Results (Labs)      History of Present Illness    CHIEF COMPLAINT:  Patient presents today for 2 week follow up.    RENAL FUNCTION:  She reports worsening renal function with creatinine increasing from 1.4 to 2.4 after Losartan dosage increase. She is currently under nephrology care.    CARDIOVASCULAR:  She has a history of cardiac failure and IVC filter placement. She denies current dyspnea, fluid buildup, dizziness, or lightheadedness.    MEDICATIONS:  She reports renal function deteriorated after starting Losartan. She continues Lasix 40mg and notes increased dyspnea with attempts to decrease the dose.      ROS:  ROS is negative unless otherwise indicated in HPI.         Past Medical History:   Diagnosis Date    Diabetes 2012    Hypothyroid 2012    Rheumatoid arthritis 2012       Past Surgical History:   Procedure Laterality Date     SECTION      1    COLONOSCOPY N/A 3/23/2023    Procedure: COLONOSCOPY;  Surgeon: Rinku Olmstead MD;  Location: [facility];  Service: Endoscopy;  Laterality: N/A;    COSMETIC SURGERY      breast augmentation and removal    HERNIA REPAIR      left LUQ incision    SPLENECTOMY, TOTAL      age 43 // main O    TONSILLECTOMY         Family History   Problem Relation Name Age of Onset    No Known Problems Mother      Ulcerative colitis Father      Cataracts Paternal Grandmother      Diabetes Maternal Aunt      Breast cancer Maternal Aunt      Glaucoma Neg Hx      Macular degeneration Neg Hx      Retinal detachment Neg Hx         Social History     Socioeconomic History    Marital status:    Tobacco Use    Smoking status: Former     Current packs/day: 0.00     Types: Cigarettes     Quit date: 2009     Years since quittin.2    Smokeless tobacco: Never   Substance and Sexual Activity     Alcohol use: Yes     Comment: occassionally    Drug use: No    Sexual activity: Yes     Social Drivers of Health     Financial Resource Strain: Low Risk  (2/5/2025)    Received from [facility]    Overall Financial Resource Strain (CARDIA)     Difficulty of Paying Living Expenses: Not hard at all   Food Insecurity: No Food Insecurity (2/5/2025)    Received from [facility]    Hunger Vital Sign     Worried About Running Out of Food in the Last Year: Never true     Ran Out of Food in the Last Year: Never true   Transportation Needs: No Transportation Needs (2/5/2025)    Received from [facility]    PRAPARE - Transportation     Lack of Transportation (Medical): No     Lack of Transportation (Non-Medical): No   Physical Activity: Inactive (2/5/2025)    Received from [facility]    Exercise Vital Sign     Days of Exercise per Week: 0 days     Minutes of Exercise per Session: 0 min   Stress: No Stress Concern Present (2/5/2025)    Received from [facility]    [place] [place] [employer] - Occupational Stress Questionnaire     Feeling of Stress : Not at all   Housing Stability: Low Risk  (2/5/2025)    Received from [facility]    Housing Stability Vital Sign     Unable to Pay for Housing in the Last Year: No     Number of Times Moved in the Last Year: 0     Homeless in the Last Year: No       Current Medications[1]    Review of patient's allergies indicates:   Allergen Reactions    Enbrel [etanercept] Rash     psoriasis    Amoxil [amoxicillin] Hives         Objective:        Vitals:    05/28/25 1031   BP: 118/62   Pulse: 62       Physical Exam  Vitals reviewed.   Constitutional:       Appearance: Normal appearance.   Cardiovascular:      Rate and Rhythm: Normal rate and regular rhythm.      Pulses: Normal pulses.      Heart sounds: Normal heart sounds. No murmur heard.     No gallop.   Pulmonary:      Effort: Pulmonary effort is normal.   Skin:     General: Skin is warm and dry.   Neurological:      General:  No focal deficit present.      Mental Status: She is alert and oriented to person, place, and time.   Psychiatric:         Mood and Affect: Mood normal.         LIPIDS - LAST 2   Lab Results   Component Value Date    CHOL 133 05/01/2025    CHOL 143 03/19/2025    HDL 48 05/01/2025    HDL 61 03/19/2025    LDLCALC 72.2 05/01/2025    LDLCALC 63.0 03/19/2025    TRIG 64 05/01/2025    TRIG 95 03/19/2025    CHOLHDL 36.1 05/01/2025    CHOLHDL 42.7 03/19/2025       CBC - LAST 2  Lab Results   Component Value Date    WBC 12.73 (H) 05/01/2025    WBC 12.10 03/19/2025    RBC 3.58 (L) 05/01/2025    RBC 3.49 (L) 03/19/2025    HGB 10.8 (L) 05/01/2025    HGB 10.5 (L) 03/19/2025    HCT 34.4 (L) 05/01/2025    HCT 35.3 (L) 03/19/2025    MCV 96 05/01/2025     (H) 03/19/2025    MCH 30.2 05/01/2025    MCH 30.1 03/19/2025    MCHC 31.4 (L) 05/01/2025    MCHC 29.7 (L) 03/19/2025    RDW 14.9 (H) 05/01/2025    RDW 19.4 (H) 03/19/2025     05/01/2025     03/19/2025    MPV 11.1 05/01/2025    MPV 12.0 03/19/2025    GRAN 3.9 05/13/2024    GRAN 39.6 05/13/2024    LYMPH 28.5 05/01/2025    LYMPH 3.63 05/01/2025    MONO 11.9 05/01/2025    MONO 1.52 (H) 05/01/2025    BASO 143 10/25/2024    BASO 0.16 05/13/2024    NRBC 0 05/01/2025    NRBC 0 05/13/2024       CHEMISTRY & LIVER FUNCTION - LAST 2  Lab Results   Component Value Date     05/27/2025     (L) 05/23/2025    K 5.0 05/27/2025    K 5.0 05/23/2025     05/27/2025    CL 97 05/23/2025    CO2 24 05/27/2025    CO2 26 05/23/2025    ANIONGAP 16 05/27/2025    ANIONGAP 11 05/23/2025    BUN 56 (H) 05/27/2025    BUN 58 (H) 05/23/2025    CREATININE 2.4 (H) 05/27/2025    CREATININE 2.2 (H) 05/23/2025     (H) 05/27/2025     (H) 05/23/2025    CALCIUM 9.6 05/27/2025    CALCIUM 9.7 05/23/2025    PH 5.0 02/20/2025    PH 5.5 02/05/2025    MG 1.9 05/01/2025    ALBUMIN 2.8 (L) 05/01/2025    ALBUMIN 2.6 (L) 03/31/2025    PROT 7.2 03/31/2025    PROT 7.3 03/19/2025     ALKPHOS 137 03/31/2025    ALKPHOS 154 (H) 03/19/2025    ALT 24 05/01/2025    ALT 12 03/31/2025    AST 20 03/31/2025    AST 20 03/19/2025    BILITOT 0.7 03/31/2025    BILITOT 1.0 03/19/2025        CARDIAC PROFILE - LAST 2  Lab Results   Component Value Date     (H) 05/01/2025     (H) 03/31/2025    CPK 1,569 (H) 02/06/2025    CPK 2,225 (H) 02/06/2025     06/23/2011     04/28/2010        COAGULATION - LAST 2  Lab Results   Component Value Date    INR 1.2 02/14/2025    INR 1.3 (H) 02/13/2025    APTT 34.9 (H) 02/05/2025    APTT 35.3 (H) 02/05/2025       ENDOCRINE & PSA - LAST 2  Lab Results   Component Value Date    HGBA1C 9.4 (H) 05/01/2025    HGBA1C 7.9 (H) 02/06/2025    TSH 0.090 (L) 04/24/2025    TSH 46.696 (H) 03/19/2025        ECHOCARDIOGRAM RESULTS  No results found for this or any previous visit.      CURRENT/PREVIOUS VISIT EKG  Results for orders placed or performed in visit on 04/02/25   IN OFFICE EKG 12-LEAD (to [place])    Collection Time: 04/02/25  8:06 AM   Result Value Ref Range    QRS Duration 114 ms    [facility]HS QTC Calculation 493 ms    Narrative    Test Reason : I50.20,I48.92,    Vent. Rate :  86 BPM     Atrial Rate :  86 BPM     P-R Int : 212 ms          QRS Dur : 114 ms      QT Int : 412 ms       P-R-T Axes :  69 -38 129 degrees    QTcB Int : 493 ms    Sinus rhythm with 1st degree A-V block  Possible Left atrial enlargement  Left axis deviation  Anterior infarct ,age undetermined  Abnormal ECG  No previous ECGs available  Confirmed by Carlton Smith ([id number]) on 5/13/2025 10:50:49 AM    Referred By: JAYESH GRANT           Confirmed By: Carlton Smith     No valid procedures specified.   No results found for this or any previous visit.    No valid procedures specified.        Assessment:        Assessment & Plan      History of heart failure with reduced EF, nonischemic cardiomyopathy and arrhythmia.  Under care of EP for the arrhythmia and consideration of ICD.   Attempted to increase losartan and next visit but persistently worsening creatinine, will decrease losartan and recheck BMP next week, if creatinine still not improve, will decrease the Lasix dose as well and have her follow-up next week at some point.  If creatinine improves, will continue with decreased dose of losartan and follow-up in 3 months.  Will refer her to advanced heart failure as well.  Follow-up with EP for consideration of ICD.    PLAN SUMMARY:  - Reduced Losartan from 50 mg to 25 mg daily  - Continued Lasix 40 mg daily  - Ordered renal function labs for Monday or Tuesday  - Referred to vascular surgeon for IVC filter removal  - Referred to advanced heart failure cardiologist (Dr. Cuellar) in [place]  - Patient to contact nephrologist about recent renal function changes  - Contact office if kidney numbers continue to worsen for potential earlier visit with advanced heart failure cardiologist  - Await call from office for appointment with Dr. Cuellar    CHRONIC SYSTOLIC HEART FAILURE:  - Advanced heart failure management needed given age (60) and current cardiac status.  - Discussed the role of advanced heart failure doctors and potential need for heart transplant evaluation in the future.  - Referred to advanced heart failure cardiologist (Dr. Cuellar) in [place], await call from office for appointment.  - Contact office if kidney numbers continue to worsen for potential earlier visit with advanced heart failure cardiologist.  - Continued Lasix 40 mg daily.    ACUTE KIDNEY FAILURE:  - Renal function deteriorated after increasing Losartan (creatinine mona from 1.4 to 2.4).  - Lasix may impact renal function, but uncertain which medication is the primary cause.  - Will reassess renal function after Losartan reduction before considering further medication adjustments.  - Patient to contact nephrologist to inform about recent changes in renal function and seek guidance.  - Ordered renal function  (labs) on Monday or Tuesday.    ADVERSE EFFECT OF ANTIHYPERTENSIVE DRUGS:  - Reduced Losartan from 50 mg to 25 mg daily due to potential adverse effect on renal function.    VASCULAR IMPLANT:  - Referred to vascular surgeon for IVC filter removal.        1. PORSHA (acute kidney injury)    2. HFrEF (heart failure with reduced ejection fraction)    3. S/P IVC filter    4. Non-ischemic cardiomyopathy           Plan:       PORSHA (acute kidney injury)  -     Basic metabolic panel; Future; Expected date: 05/28/2025    HFrEF (heart failure with reduced ejection fraction)  -     losartan (COZAAR) 25 MG tablet; Take 1 tablet (25 mg total) by mouth once daily.  Dispense: 90 tablet; Refill: 3  -     Ambulatory referral/consult to Adult Advanced Heart Failure; Future; Expected date: 06/04/2025    S/P IVC filter  -     Ambulatory referral/consult to Vascular Surgery; Future; Expected date: 06/04/2025    Non-ischemic cardiomyopathy      Follow up in about 3 months (around 8/28/2025) for f/u HFrEF.      All pertinent data including labs, imaging, EKGs, and studies listed above were reviewed personally.  Patient's most recent EKG tracing was personally interpreted by this provider.    This note was generated with the assistance of ambient listening technology. Verbal consent was obtained by the patient and accompanying visitor(s) for the recording of patient appointment to facilitate this note. I attest to having reviewed and edited the generated note for accuracy, though some syntax or spelling errors may persist. Please contact the author of this note for any clarification.      MD Dez [facility] [place] [facility] [place] [facility]         [1]   Current Outpatient Medications   Medication Sig Dispense Refill    amiodarone (PACERONE) 200 MG Tab Take 0.5 tablets (100 mg total) by mouth once daily. 90 tablet 3    blood sugar diagnostic Strp To check BG 1 times daily, to use with insurance preferred  meter 100 strip 12    empagliflozin (JARDIANCE) 25 mg tablet Take 1 tablet (25 mg total) by mouth once daily. 30 tablet 5    furosemide (LASIX) 40 MG tablet TAKE 1 TABLET BY MOUTH IN THE MORNING DAILY & 1/2 TABLET AT NOON X 3 DAYS AS NEEDED FOR SWELLING (Patient taking differently: Take 40 mg by mouth once daily. 40 mg once a day) 135 tablet 0    glimepiride (AMARYL) 2 MG tablet TAKE 1 TABLET (2 MG TOTAL) BY MOUTH BEFORE BREAKFAST 90 tablet 1    insulin aspart U-100 (NOVOLOG FLEXPEN U-100 INSULIN) 100 unit/mL (3 mL) InPn pen Inject 5 Units into the skin 3 (three) times daily with meals. ((PRN sliding scale -199: 2 units  -249: 4 units  -299: 7 units  -349: 10 units  BG Over 350: 12 units )) Max daily dose 25 units 1 each 1    lancets Misc To check BG 1 times daily, to use with insurance preferred meter 100 each 12    levothyroxine (SYNTHROID) 200 MCG tablet 1 tab po 6 x wkly & 1/2 tab po Sunday 78 tablet 2    metoprolol succinate (TOPROL-XL) 200 MG 24 hr tablet Take 200 mg by mouth once daily.      multivitamin with folic acid 400 mcg Tab Take 1 tablet by mouth once daily.      pantoprazole (PROTONIX) 40 MG tablet Take 40 mg by mouth daily as needed.      potassium chloride (KLOR-CON) 10 MEQ TbSR TAKE 1 TABLET BY MOUTH ONCE DAILY 90 tablet 3    pravastatin (PRAVACHOL) 10 MG tablet Take 1 tablet (10 mg total) by mouth once daily. 90 tablet 2    spironolactone (ALDACTONE) 25 MG tablet Take 1 tablet (25 mg total) by mouth once daily. 30 tablet 11    traMADoL (ULTRAM) 50 mg tablet Take 1 tablet (50 mg total) by mouth every 12 (twelve) hours as needed for Pain (rheumatoid arthritis). 60 tablet 3    triamcinolone acetonide 0.1% (KENALOG) 0.1 % ointment Apply topically 2 (two) times daily. 453 g 0    losartan (COZAAR) 25 MG tablet Take 1 tablet (25 mg total) by mouth once daily. 90 tablet 3     No current facility-administered medications for this visit.

## 2025-06-03 ENCOUNTER — TELEPHONE (OUTPATIENT)
Dept: NEPHROLOGY | Facility: CLINIC | Age: 60
End: 2025-06-03
Payer: COMMERCIAL

## 2025-06-03 ENCOUNTER — LAB VISIT (OUTPATIENT)
Dept: LAB | Facility: HOSPITAL | Age: 60
End: 2025-06-03
Payer: COMMERCIAL

## 2025-06-03 DIAGNOSIS — N17.9 AKI (ACUTE KIDNEY INJURY): ICD-10-CM

## 2025-06-03 LAB
ANION GAP (OHS): 9 MMOL/L (ref 8–16)
BUN SERPL-MCNC: 61 MG/DL (ref 6–20)
CALCIUM SERPL-MCNC: 9.5 MG/DL (ref 8.7–10.5)
CHLORIDE SERPL-SCNC: 101 MMOL/L (ref 95–110)
CO2 SERPL-SCNC: 28 MMOL/L (ref 23–29)
CREAT SERPL-MCNC: 2.2 MG/DL (ref 0.5–1.4)
GFR SERPLBLD CREATININE-BSD FMLA CKD-EPI: 25 ML/MIN/1.73/M2
GLUCOSE SERPL-MCNC: 321 MG/DL (ref 70–110)
POTASSIUM SERPL-SCNC: 5.4 MMOL/L (ref 3.5–5.1)
SODIUM SERPL-SCNC: 138 MMOL/L (ref 136–145)

## 2025-06-03 PROCEDURE — 36415 COLL VENOUS BLD VENIPUNCTURE: CPT | Mod: PN

## 2025-06-03 PROCEDURE — 80048 BASIC METABOLIC PNL TOTAL CA: CPT

## 2025-06-03 NOTE — TELEPHONE ENCOUNTER
Copied from CRM #[id number]. Topic: General Inquiry - Patient Advice  >> May 30, 2025  4:12 PM Renetta wrote:  Type: Needs Medical Advice  [facility]  Best Call Back Number: [phone]  Additional Information: Debra is calling in regards to pts concerns regarding Lasix, stated she was informed by her cardiologist that her kidney function was low and was told to reach out to the office to see what they think should be done. Can we please debra back to advise.

## 2025-06-10 ENCOUNTER — OFFICE VISIT (OUTPATIENT)
Dept: VASCULAR SURGERY | Facility: CLINIC | Age: 60
End: 2025-06-10
Payer: COMMERCIAL

## 2025-06-10 ENCOUNTER — RESULTS FOLLOW-UP (OUTPATIENT)
Dept: CARDIOLOGY | Facility: CLINIC | Age: 60
End: 2025-06-10
Payer: COMMERCIAL

## 2025-06-10 VITALS
DIASTOLIC BLOOD PRESSURE: 58 MMHG | HEIGHT: 63 IN | HEART RATE: 65 BPM | SYSTOLIC BLOOD PRESSURE: 106 MMHG | BODY MASS INDEX: 29.18 KG/M2 | WEIGHT: 164.69 LBS

## 2025-06-10 DIAGNOSIS — N17.9 AKI (ACUTE KIDNEY INJURY): Primary | ICD-10-CM

## 2025-06-10 DIAGNOSIS — E87.5 HYPERKALEMIA: ICD-10-CM

## 2025-06-10 DIAGNOSIS — Z95.828 S/P IVC FILTER: Primary | ICD-10-CM

## 2025-06-10 DIAGNOSIS — I82.409 DVT (DEEP VENOUS THROMBOSIS): ICD-10-CM

## 2025-06-10 DIAGNOSIS — Z95.828 S/P IVC FILTER: ICD-10-CM

## 2025-06-10 PROCEDURE — 3074F SYST BP LT 130 MM HG: CPT | Mod: CPTII,S$GLB,, | Performed by: STUDENT IN AN ORGANIZED HEALTH CARE EDUCATION/TRAINING PROGRAM

## 2025-06-10 PROCEDURE — 3078F DIAST BP <80 MM HG: CPT | Mod: CPTII,S$GLB,, | Performed by: STUDENT IN AN ORGANIZED HEALTH CARE EDUCATION/TRAINING PROGRAM

## 2025-06-10 PROCEDURE — 99999 PR PBB SHADOW E&M-EST. PATIENT-LVL IV: CPT | Mod: PBBFAC,,, | Performed by: STUDENT IN AN ORGANIZED HEALTH CARE EDUCATION/TRAINING PROGRAM

## 2025-06-10 PROCEDURE — 3061F NEG MICROALBUMINURIA REV: CPT | Mod: CPTII,S$GLB,, | Performed by: STUDENT IN AN ORGANIZED HEALTH CARE EDUCATION/TRAINING PROGRAM

## 2025-06-10 PROCEDURE — 3008F BODY MASS INDEX DOCD: CPT | Mod: CPTII,S$GLB,, | Performed by: STUDENT IN AN ORGANIZED HEALTH CARE EDUCATION/TRAINING PROGRAM

## 2025-06-10 PROCEDURE — 3066F NEPHROPATHY DOC TX: CPT | Mod: CPTII,S$GLB,, | Performed by: STUDENT IN AN ORGANIZED HEALTH CARE EDUCATION/TRAINING PROGRAM

## 2025-06-10 PROCEDURE — 99204 OFFICE O/P NEW MOD 45 MIN: CPT | Mod: S$GLB,,, | Performed by: STUDENT IN AN ORGANIZED HEALTH CARE EDUCATION/TRAINING PROGRAM

## 2025-06-10 PROCEDURE — 3046F HEMOGLOBIN A1C LEVEL >9.0%: CPT | Mod: CPTII,S$GLB,, | Performed by: STUDENT IN AN ORGANIZED HEALTH CARE EDUCATION/TRAINING PROGRAM

## 2025-06-10 PROCEDURE — 4010F ACE/ARB THERAPY RXD/TAKEN: CPT | Mod: CPTII,S$GLB,, | Performed by: STUDENT IN AN ORGANIZED HEALTH CARE EDUCATION/TRAINING PROGRAM

## 2025-06-10 RX ORDER — POTASSIUM CHLORIDE 750 MG/1
10 TABLET, EXTENDED RELEASE ORAL ONCE
COMMUNITY

## 2025-06-10 RX ORDER — SPIRONOLACTONE 25 MG/1
25 TABLET ORAL DAILY
COMMUNITY

## 2025-06-10 RX ORDER — LIDOCAINE HYDROCHLORIDE 10 MG/ML
1 INJECTION, SOLUTION EPIDURAL; INFILTRATION; INTRACAUDAL; PERINEURAL ONCE
OUTPATIENT
Start: 2025-06-10 | End: 2025-06-10

## 2025-06-10 NOTE — PROGRESS NOTES
[facility] - Cardiovascular Surg  [facility]  History & Physical    PATIENT NAME: Domi Leary  MRN: [id number]  TODAY'S DATE: 06/10/2025    SUBJECTIVE:     Chief Complaint: No chief complaint on file.        History of Present Illness:  Domi Leary is a 60 y.o. female presents as a referral for IVC filter removal    She initially presented to [facility] 2025 with a flu and was in critical condition. She developed an abdominal bleed while in the hospital and also had new diagnosis of DVT and PE. The anticoagulation was stopped and an IVC filter was placed 25    She has since recovered. She denies any other history of DVT.      Review of patient's allergies indicates:   Allergen Reactions    Enbrel [etanercept] Rash     psoriasis    Amoxil [amoxicillin] Hives       Past Medical History:   Diagnosis Date    Diabetes 2012    Hypothyroid 2012    Rheumatoid arthritis 2012     Past Surgical History:   Procedure Laterality Date     SECTION      1    COLONOSCOPY N/A 3/23/2023    Procedure: COLONOSCOPY;  Surgeon: Rinku Olmstead MD;  Location: [facility];  Service: Endoscopy;  Laterality: N/A;    COSMETIC SURGERY      breast augmentation and removal    HERNIA REPAIR      left LUQ incision    SPLENECTOMY, TOTAL      age 43 // main O    TONSILLECTOMY       Family History   Problem Relation Name Age of Onset    No Known Problems Mother      Ulcerative colitis Father      Cataracts Paternal Grandmother      Diabetes Maternal Aunt      Breast cancer Maternal Aunt      Glaucoma Neg Hx      Macular degeneration Neg Hx      Retinal detachment Neg Hx       Social History[1]     Review of Systems:  ROS    OBJECTIVE:     Vital Signs (Most Recent):  Pulse: 65 (06/10/25 0830)  BP: (!) 106/58 (06/10/25 0830)      Physical Exam:  Physical Exam  Constitutional: Awake and oriented to person, place, and time. Appears well-developed and well-nourished. In no apparent distress.  HEENT:  Normocephalic. Pupils normal and conjunctivae normal. Mucous membranes normal, no cyanosis or icterus, trachea central, no pallor or icterus is noted.  Neck: Normal range of motion. Neck supple. No JVD present. No bruit present.   Cardiovascular: Normal rate, regular rhythm and normal heart sounds. S1, S2. Exam reveals no gallop, clicks, or friction rub. No murmur noted.  Pulmonary/Chest: Effort normal and breath sounds clear to auscultation. No respiratory distress. No wheezes, rales, or coughing present.   Abdominal: Soft. Bowel sounds are normal. There is no tenderness. No rebound tenderness or guarding present. No abdomen pulsations, bruits, or masses noted.   Urinary: No bro catheter present  Skin: Skin is warm and dry.  Extremities: No cyanosis, erythema, clubbing or edema noted at this time. No calf tenderness bilaterally.   Peripheral vascular system: Good palpable distal pulses.       Laboratory:  Lab Results   Component Value Date    WBC 12.73 (H) 05/01/2025    HGB 10.8 (L) 05/01/2025    HCT 34.4 (L) 05/01/2025     05/01/2025    CHOL 133 05/01/2025    TRIG 64 05/01/2025    HDL 48 05/01/2025    ALT 24 05/01/2025    AST 20 03/31/2025     06/03/2025    K 5.4 (H) 06/03/2025     06/03/2025    CREATININE 2.2 (H) 06/03/2025    BUN 61 (H) 06/03/2025    CO2 28 06/03/2025    TSH 0.090 (L) 04/24/2025    INR 1.2 02/14/2025    HGBA1C 9.4 (H) 05/01/2025         Diagnostic Results:  Cardiac Monitor - 3-15 Day Adult (Cupid Only)    Predominant underlying rhythm was Sinus Rhythm. No Afib/ flutter.    Patient had a min HR of 59 bpm, max sinus HR of 119  bpm, and avg HR of   77 bpm.    1 run of Ventricular Tachycardia occurred lasting 9 beats with a max   rate of 126 bpm (avg 119 bpm).    Isolated VEs were rare (<1.0%, 21994), VE Couplets were rare (<1.0%,   8), and VE Triplets were rare (<1.0%, 1). Ventricular Bigeminy and   Trigeminy were present.    2 Supraventricular Tachycardia runs occurred, the  run with the fastest   interval lasting 5 beats with a max rate of 130 bpm, the longest lasting 5   beats with an avg rate of 106 bpm.    Isolated SVEs were rare (<1.0%, 396), and no SVE Couplets or SVE   Triplets were present.    Symptom Triggered Event: The patient complained of 8 episodes. The   symptom(s) included chest pain. The corresponding rhythm to the patient   reported event was normal sinus rhythm. These symptoms were occasionally   associated with PVCs.    No results found for this or any previous visit (from the past 2160 hours).       ASSESSMENT/PLAN:     60-year-old female who presents with a history of DVT complicated by bleeding on anticoagulation requiring IVC filter placement.      She currently no longer has a need for the IVC filter in his requested for her to be removed.      Will plan for IVC filter retrieval in the cath lab, ideally next week Monday.  Risks and benefits were discussed with the patient in extensive detail and she agreed to move forward with the procedure    S/P IVC filter  -     Ambulatory referral/consult to Vascular Surgery                  Elizabeth Maddox M.D.   [facility]   Date of Service: 06/10/2025             [1]   Social History  Tobacco Use    Smoking status: Former     Current packs/day: 0.00     Types: Cigarettes     Quit date: 2009     Years since quittin.3    Smokeless tobacco: Never   Substance Use Topics    Alcohol use: Yes     Comment: occassionally    Drug use: No

## 2025-06-10 NOTE — PROGRESS NOTES
Hi, please inform the patient that her kidney function is stable as compared to the kidney function about 1 week ago but her potassium level is high, she should stop taking her potassium supplement and her spironolactone for now and repeat the blood work for kidneys this Thursday or Friday, ty

## 2025-06-10 NOTE — H&P (VIEW-ONLY)
[facility] - Cardiovascular Surg  [facility]  History & Physical    PATIENT NAME: Domi Leary  MRN: [id number]  TODAY'S DATE: 06/10/2025    SUBJECTIVE:     Chief Complaint: No chief complaint on file.        History of Present Illness:  Domi Leary is a 60 y.o. female presents as a referral for IVC filter removal    She initially presented to [facility] 2025 with a flu and was in critical condition. She developed an abdominal bleed while in the hospital and also had new diagnosis of DVT and PE. The anticoagulation was stopped and an IVC filter was placed 25    She has since recovered. She denies any other history of DVT.      Review of patient's allergies indicates:   Allergen Reactions    Enbrel [etanercept] Rash     psoriasis    Amoxil [amoxicillin] Hives       Past Medical History:   Diagnosis Date    Diabetes 2012    Hypothyroid 2012    Rheumatoid arthritis 2012     Past Surgical History:   Procedure Laterality Date     SECTION      1    COLONOSCOPY N/A 3/23/2023    Procedure: COLONOSCOPY;  Surgeon: Rinku Olmstead MD;  Location: [facility];  Service: Endoscopy;  Laterality: N/A;    COSMETIC SURGERY      breast augmentation and removal    HERNIA REPAIR      left LUQ incision    SPLENECTOMY, TOTAL      age 43 // main O    TONSILLECTOMY       Family History   Problem Relation Name Age of Onset    No Known Problems Mother      Ulcerative colitis Father      Cataracts Paternal Grandmother      Diabetes Maternal Aunt      Breast cancer Maternal Aunt      Glaucoma Neg Hx      Macular degeneration Neg Hx      Retinal detachment Neg Hx       Social History[1]     Review of Systems:  ROS    OBJECTIVE:     Vital Signs (Most Recent):  Pulse: 65 (06/10/25 0830)  BP: (!) 106/58 (06/10/25 0830)      Physical Exam:  Physical Exam  Constitutional: Awake and oriented to person, place, and time. Appears well-developed and well-nourished. In no apparent distress.  HEENT:  Normocephalic. Pupils normal and conjunctivae normal. Mucous membranes normal, no cyanosis or icterus, trachea central, no pallor or icterus is noted.  Neck: Normal range of motion. Neck supple. No JVD present. No bruit present.   Cardiovascular: Normal rate, regular rhythm and normal heart sounds. S1, S2. Exam reveals no gallop, clicks, or friction rub. No murmur noted.  Pulmonary/Chest: Effort normal and breath sounds clear to auscultation. No respiratory distress. No wheezes, rales, or coughing present.   Abdominal: Soft. Bowel sounds are normal. There is no tenderness. No rebound tenderness or guarding present. No abdomen pulsations, bruits, or masses noted.   Urinary: No bro catheter present  Skin: Skin is warm and dry.  Extremities: No cyanosis, erythema, clubbing or edema noted at this time. No calf tenderness bilaterally.   Peripheral vascular system: Good palpable distal pulses.       Laboratory:  Lab Results   Component Value Date    WBC 12.73 (H) 05/01/2025    HGB 10.8 (L) 05/01/2025    HCT 34.4 (L) 05/01/2025     05/01/2025    CHOL 133 05/01/2025    TRIG 64 05/01/2025    HDL 48 05/01/2025    ALT 24 05/01/2025    AST 20 03/31/2025     06/03/2025    K 5.4 (H) 06/03/2025     06/03/2025    CREATININE 2.2 (H) 06/03/2025    BUN 61 (H) 06/03/2025    CO2 28 06/03/2025    TSH 0.090 (L) 04/24/2025    INR 1.2 02/14/2025    HGBA1C 9.4 (H) 05/01/2025         Diagnostic Results:  Cardiac Monitor - 3-15 Day Adult (Cupid Only)    Predominant underlying rhythm was Sinus Rhythm. No Afib/ flutter.    Patient had a min HR of 59 bpm, max sinus HR of 119  bpm, and avg HR of   77 bpm.    1 run of Ventricular Tachycardia occurred lasting 9 beats with a max   rate of 126 bpm (avg 119 bpm).    Isolated VEs were rare (<1.0%, 73581), VE Couplets were rare (<1.0%,   8), and VE Triplets were rare (<1.0%, 1). Ventricular Bigeminy and   Trigeminy were present.    2 Supraventricular Tachycardia runs occurred, the  run with the fastest   interval lasting 5 beats with a max rate of 130 bpm, the longest lasting 5   beats with an avg rate of 106 bpm.    Isolated SVEs were rare (<1.0%, 396), and no SVE Couplets or SVE   Triplets were present.    Symptom Triggered Event: The patient complained of 8 episodes. The   symptom(s) included chest pain. The corresponding rhythm to the patient   reported event was normal sinus rhythm. These symptoms were occasionally   associated with PVCs.    No results found for this or any previous visit (from the past 2160 hours).       ASSESSMENT/PLAN:     60-year-old female who presents with a history of DVT complicated by bleeding on anticoagulation requiring IVC filter placement.      She currently no longer has a need for the IVC filter in his requested for her to be removed.      Will plan for IVC filter retrieval in the cath lab, ideally next week Monday.  Risks and benefits were discussed with the patient in extensive detail and she agreed to move forward with the procedure    S/P IVC filter  -     Ambulatory referral/consult to Vascular Surgery                  Elizabeth Maddox M.D.   [facility]   Date of Service: 06/10/2025             [1]   Social History  Tobacco Use    Smoking status: Former     Current packs/day: 0.00     Types: Cigarettes     Quit date: 2009     Years since quittin.3    Smokeless tobacco: Never   Substance Use Topics    Alcohol use: Yes     Comment: occassionally    Drug use: No

## 2025-06-11 ENCOUNTER — TELEPHONE (OUTPATIENT)
Dept: VASCULAR SURGERY | Facility: CLINIC | Age: 60
End: 2025-06-11
Payer: COMMERCIAL

## 2025-06-11 NOTE — TELEPHONE ENCOUNTER
Spoke w/ pt.     Pt aware of:  PROC 6/16 @ 830A PHONE ASSESSMENT PREOP 6/12     Pt agreed and verbalized understanding.

## 2025-06-13 ENCOUNTER — LAB VISIT (OUTPATIENT)
Dept: LAB | Facility: HOSPITAL | Age: 60
End: 2025-06-13
Payer: COMMERCIAL

## 2025-06-13 DIAGNOSIS — N17.9 AKI (ACUTE KIDNEY INJURY): ICD-10-CM

## 2025-06-13 DIAGNOSIS — E87.5 HYPERKALEMIA: ICD-10-CM

## 2025-06-13 LAB
ANION GAP (OHS): 10 MMOL/L (ref 8–16)
BUN SERPL-MCNC: 76 MG/DL (ref 6–20)
CALCIUM SERPL-MCNC: 9.7 MG/DL (ref 8.7–10.5)
CHLORIDE SERPL-SCNC: 99 MMOL/L (ref 95–110)
CO2 SERPL-SCNC: 27 MMOL/L (ref 23–29)
CREAT SERPL-MCNC: 2.3 MG/DL (ref 0.5–1.4)
GFR SERPLBLD CREATININE-BSD FMLA CKD-EPI: 24 ML/MIN/1.73/M2
GLUCOSE SERPL-MCNC: 204 MG/DL (ref 70–110)
POTASSIUM SERPL-SCNC: 4.8 MMOL/L (ref 3.5–5.1)
SODIUM SERPL-SCNC: 136 MMOL/L (ref 136–145)

## 2025-06-13 PROCEDURE — 36415 COLL VENOUS BLD VENIPUNCTURE: CPT | Mod: PN

## 2025-06-13 PROCEDURE — 80048 BASIC METABOLIC PNL TOTAL CA: CPT

## 2025-06-16 ENCOUNTER — HOSPITAL ENCOUNTER (OUTPATIENT)
Facility: HOSPITAL | Age: 60
Discharge: HOME | End: 2025-06-16
Attending: STUDENT IN AN ORGANIZED HEALTH CARE EDUCATION/TRAINING PROGRAM | Admitting: STUDENT IN AN ORGANIZED HEALTH CARE EDUCATION/TRAINING PROGRAM
Payer: COMMERCIAL

## 2025-06-16 VITALS
SYSTOLIC BLOOD PRESSURE: 112 MMHG | DIASTOLIC BLOOD PRESSURE: 57 MMHG | OXYGEN SATURATION: 95 % | HEART RATE: 55 BPM | RESPIRATION RATE: 23 BRPM

## 2025-06-16 DIAGNOSIS — Z95.828 S/P IVC FILTER: Primary | ICD-10-CM

## 2025-06-16 DIAGNOSIS — I82.409 DVT (DEEP VENOUS THROMBOSIS): ICD-10-CM

## 2025-06-16 LAB
ANION GAP (SMH): 11 MMOL/L (ref 8–16)
BUN SERPL-MCNC: 65 MG/DL (ref 6–20)
CALCIUM SERPL-MCNC: 9.9 MG/DL (ref 8.7–10.5)
CHLORIDE SERPL-SCNC: 104 MMOL/L (ref 95–110)
CO2 SERPL-SCNC: 25 MMOL/L (ref 23–29)
CREAT SERPL-MCNC: 2.1 MG/DL (ref 0.5–1.4)
GFR SERPLBLD CREATININE-BSD FMLA CKD-EPI: 27 ML/MIN/1.73/M2
GLUCOSE SERPL-MCNC: 114 MG/DL (ref 70–110)
POTASSIUM SERPL-SCNC: 4.1 MMOL/L (ref 3.5–5.1)
SODIUM SERPL-SCNC: 140 MMOL/L (ref 136–145)

## 2025-06-16 PROCEDURE — 80048 BASIC METABOLIC PNL TOTAL CA: CPT | Performed by: STUDENT IN AN ORGANIZED HEALTH CARE EDUCATION/TRAINING PROGRAM

## 2025-06-16 PROCEDURE — 37193 REM ENDOVAS VENA CAVA FILTER: CPT | Mod: ,,, | Performed by: STUDENT IN AN ORGANIZED HEALTH CARE EDUCATION/TRAINING PROGRAM

## 2025-06-16 PROCEDURE — 63600175 PHARM REV CODE 636 W HCPCS: Performed by: STUDENT IN AN ORGANIZED HEALTH CARE EDUCATION/TRAINING PROGRAM

## 2025-06-16 PROCEDURE — C1769 GUIDE WIRE: HCPCS | Performed by: STUDENT IN AN ORGANIZED HEALTH CARE EDUCATION/TRAINING PROGRAM

## 2025-06-16 PROCEDURE — 25500020 PHARM REV CODE 255: Performed by: STUDENT IN AN ORGANIZED HEALTH CARE EDUCATION/TRAINING PROGRAM

## 2025-06-16 PROCEDURE — C1773 RET DEV, INSERTABLE: HCPCS | Performed by: STUDENT IN AN ORGANIZED HEALTH CARE EDUCATION/TRAINING PROGRAM

## 2025-06-16 PROCEDURE — 37193 REM ENDOVAS VENA CAVA FILTER: CPT | Performed by: STUDENT IN AN ORGANIZED HEALTH CARE EDUCATION/TRAINING PROGRAM

## 2025-06-16 PROCEDURE — 99152 MOD SED SAME PHYS/QHP 5/>YRS: CPT | Mod: ,,, | Performed by: STUDENT IN AN ORGANIZED HEALTH CARE EDUCATION/TRAINING PROGRAM

## 2025-06-16 PROCEDURE — C1894 INTRO/SHEATH, NON-LASER: HCPCS | Performed by: STUDENT IN AN ORGANIZED HEALTH CARE EDUCATION/TRAINING PROGRAM

## 2025-06-16 RX ORDER — FENTANYL CITRATE 50 UG/ML
INJECTION, SOLUTION INTRAMUSCULAR; INTRAVENOUS
Status: DISCONTINUED | OUTPATIENT
Start: 2025-06-16 | End: 2025-06-16 | Stop reason: HOSPADM

## 2025-06-16 RX ORDER — LIDOCAINE HYDROCHLORIDE 10 MG/ML
1 INJECTION, SOLUTION EPIDURAL; INFILTRATION; INTRACAUDAL; PERINEURAL ONCE
Status: COMPLETED | OUTPATIENT
Start: 2025-06-16 | End: 2025-06-16

## 2025-06-16 RX ORDER — IODIXANOL 320 MG/ML
INJECTION, SOLUTION INTRAVASCULAR
Status: DISCONTINUED | OUTPATIENT
Start: 2025-06-16 | End: 2025-06-16 | Stop reason: HOSPADM

## 2025-06-16 RX ORDER — MIDAZOLAM HYDROCHLORIDE 1 MG/ML
INJECTION INTRAMUSCULAR; INTRAVENOUS
Status: DISCONTINUED | OUTPATIENT
Start: 2025-06-16 | End: 2025-06-16 | Stop reason: HOSPADM

## 2025-06-16 NOTE — Clinical Note
The site was marked. The right groin and right neck was prepped. The site was prepped with ChloraPrep. The site was clipped. The patient was draped.

## 2025-06-16 NOTE — Clinical Note
A dressing was applied to the right internal jugular vein puncture site. Dermabond, 4x4 and tegaderm

## 2025-06-16 NOTE — DISCHARGE SUMMARY
[facility]  Discharge Summary     Patient ID:  Domi Leary  [id number]  60 y.o.  1965    Admit date: 6/16/2025    Discharge Date and Time: 06/16/2025 8:14 AM    Admitting Physician: Elizabeth Maddox MD     Discharge Provider: Elizabeth Maddox    Reason for Admission: S/P IVC filter [Z95.828]  DVT (deep venous thrombosis) [I82.409]    Admission Condition: good    Procedures Performed: Procedure(s) (LRB):  REMOVAL-FILTER-IVC (N/A)    Hospital Course (synopsis of major diagnoses, care, treatment, and services provided during the course of the hospital stay): no events     Consults: None    Significant Diagnostic Studies: see chart    Final Diagnoses:    Principal Problem: <principal problem not specified>   Secondary Diagnoses: There are no hospital problems to display for this patient.     Discharged Condition: good    Discharge Exam:  Awake and alert  Right neck soft    Disposition: Home or Self Care    Follow Up/Patient Instructions:     Medications:  Reconciled Home Medications:      Medication List        CHANGE how you take these medications      furosemide 40 MG tablet  Commonly known as: LASIX  TAKE 1 TABLET BY MOUTH IN THE MORNING DAILY & 1/2 TABLET AT NOON X 3 DAYS AS NEEDED FOR SWELLING  What changed:   how much to take  how to take this  when to take this  additional instructions            CONTINUE taking these medications      amiodarone 200 MG Tab  Commonly known as: PACERONE  Take 0.5 tablets (100 mg total) by mouth once daily.     blood sugar diagnostic Strp  To check BG 1 times daily, to use with insurance preferred meter     empagliflozin 25 mg tablet  Commonly known as: JARDIANCE  Take 1 tablet (25 mg total) by mouth once daily.     glimepiride 2 MG tablet  Commonly known as: AMARYL  TAKE 1 TABLET (2 MG TOTAL) BY MOUTH BEFORE BREAKFAST     insulin aspart U-100 100 unit/mL (3 mL) Inpn pen  Commonly known as: NovoLOG Flexpen U-100 Insulin  Inject 5 Units into the skin 3 (three)  times daily with meals. ((PRN sliding scale -199: 2 units  -249: 4 units  -299: 7 units  -349: 10 units  BG Over 350: 12 units )) Max daily dose 25 units     lancets Misc  To check BG 1 times daily, to use with insurance preferred meter     levothyroxine 200 MCG tablet  Commonly known as: SYNTHROID  1 tab po 6 x wkly & 1/2 tab po Sunday     losartan 25 MG tablet  Commonly known as: COZAAR  Take 1 tablet (25 mg total) by mouth once daily.     metoprolol succinate 200 MG 24 hr tablet  Commonly known as: TOPROL-XL  Take 200 mg by mouth once daily.     multivitamin with folic acid 400 mcg Tab  Take 1 tablet by mouth once daily.     pantoprazole 40 MG tablet  Commonly known as: PROTONIX  Take 40 mg by mouth once daily.     potassium chloride 10 MEQ Tbsr  Commonly known as: KLOR-CON  Take 10 mEq by mouth once.     pravastatin 10 MG tablet  Commonly known as: PRAVACHOL  Take 1 tablet (10 mg total) by mouth once daily.     spironolactone 25 MG tablet  Commonly known as: ALDACTONE  Take 25 mg by mouth once daily.     traMADoL 50 mg tablet  Commonly known as: ULTRAM  Take 1 tablet (50 mg total) by mouth every 12 (twelve) hours as needed for Pain (rheumatoid arthritis).     triamcinolone acetonide 0.1% 0.1 % ointment  Commonly known as: KENALOG  Apply topically 2 (two) times daily.            Discharge Procedure Orders   Diet Adult Regular     Remove dressing in 24 hours     Activity as tolerated     Shower on day dressing removed (No bath)       Activity: activity as tolerated  Diet: regular diet  Wound Care: keep wound clean and dry    Follow-up in 2wks    Signed:  Elizabeth Maddox  6/16/2025  8:14 AM

## 2025-06-16 NOTE — OP NOTE
Critical access hospital  Vascular Surgery  Operative Note    SUMMARY     Date of Procedure: 6/16/2025     Procedure:  Inferior vena cava filter removal  Inferior vena cava venogram  Supervised conscious sedation     Surgeons and Role:     * Elizabeth Maddox MD - Primary    Assisting Surgeon: None    Pre-Operative Diagnosis: S/P IVC filter [Z95.828]    Post-Operative Diagnosis: Post-Op Diagnosis Codes:     * S/P IVC filter [Z95.828]    Indication:  Patient with a history of IVC filter placement and is no longer in need of the filter.    Anesthesia: Conscious sedation was achieved using intravenous Versed and fentanyl administration by an independent, trained observer who monitored the patient's rhythm strip, vital signs and pulse oximetry.  Supervised intraprocedure time: >60minutes      Operative Findings (including complications, if any):  See below        Description of Technical Procedures:   The patient was taken to the cath lab and placed supine position.  The patient's right neck was prepped and draped in sterile fashion.  Using ultrasound the right internal jugular was accessed with a micropuncture kit and was serially dilated and the filter retrieval sheath was placed.  The sheath was placed in the IVC in a venogram was performed which illustrated patent renal veins patent IVC with no clot within the filter.  The snare was placed through the sheath and the IVC filter was hooked and retrieved.  A repeat venogram was performed illustrating no perforation with a patent IVC and renal veins.  The sheath was removed and pressure was held    Estimated Blood Loss (EBL): * No values recorded between 6/16/2025 12:00 AM and 6/16/2025  8:12 AM *           Implants: * No implants in log *    Specimens:   Specimen (24h ago, onward)      None                    Condition: Good    Disposition: PACU - hemodynamically stable.    Attestation: I was present and scrubbed for the entire procedure.

## 2025-06-16 NOTE — DISCHARGE INSTRUCTIONS
You may shower in 24 hours after the procedure. Allow soapy water to gently run over chest incision, not directly on incision. Do not rub or scrub incision. No bathtubs or submerging in water until site is completely healed.   Keep site clean and dry at all times.   Inspect site daily for tenderness, discharge, or signs of infection.  Apply ice pack for 24 hours

## 2025-06-23 ENCOUNTER — DOCUMENT SCAN (OUTPATIENT)
Dept: HOME HEALTH SERVICES | Facility: HOSPITAL | Age: 60
End: 2025-06-23
Payer: COMMERCIAL

## 2025-06-23 ENCOUNTER — TELEPHONE (OUTPATIENT)
Dept: FAMILY MEDICINE | Facility: CLINIC | Age: 60
End: 2025-06-23
Payer: COMMERCIAL

## 2025-06-23 DIAGNOSIS — I42.8 NON-ISCHEMIC CARDIOMYOPATHY: Primary | ICD-10-CM

## 2025-06-23 DIAGNOSIS — I47.10 PAROXYSMAL SVT (SUPRAVENTRICULAR TACHYCARDIA): ICD-10-CM

## 2025-06-23 DIAGNOSIS — I50.20 HEART FAILURE WITH REDUCED EJECTION FRACTION: ICD-10-CM

## 2025-06-23 RX ORDER — METOPROLOL SUCCINATE 200 MG/1
200 TABLET, EXTENDED RELEASE ORAL DAILY
Qty: 90 TABLET | Refills: 2 | Status: SHIPPED | OUTPATIENT
Start: 2025-06-23

## 2025-06-23 NOTE — TELEPHONE ENCOUNTER
Please approve for metoprolol succinate (TOPROL-XL) 200 MG 24 hr tablet   Last OV 04/30/25  Last refill date 03/13/25  Next appt 07/17/25    Copied from CRM #[id number]. Topic: Medications - Medication Refill  >> Jun 23, 2025 10:47 AM Che wrote:  Type:  RX Refill Request    Who Called:  Ariane/OPHELIA  Refill or New Rx: Refill  RX Name and Strength: Disp Refills Start  metoprolol succinate (TOPROL-XL) 200 MG 24 hr tablet - -  -   Sig - Route: Take 200 mg by mouth once daily. - Oral   Class: Historical Med       How is the patient currently taking it? (ex. 1XDay):see above  Is this a 30 day or 90 day RX:see above  Preferred Pharmacy with phone number:  [id number] [place] [address]  [address] [place] [address]  Phone: [phone] Fax: [phone]  Local or Mail Order:local  Ordering Provider:  Would the patient rather a call back or a response via [facility]? call  Best Call Back Number:[phone]  Additional Information:

## 2025-07-01 ENCOUNTER — DOCUMENT SCAN (OUTPATIENT)
Dept: HOME HEALTH SERVICES | Facility: HOSPITAL | Age: 60
End: 2025-07-01
Payer: COMMERCIAL

## 2025-07-01 ENCOUNTER — TELEPHONE (OUTPATIENT)
Dept: RHEUMATOLOGY | Facility: CLINIC | Age: 60
End: 2025-07-01
Payer: COMMERCIAL

## 2025-07-01 NOTE — TELEPHONE ENCOUNTER
Copied from CRM #[id number]. Topic: General Inquiry - Patient Advice  >> Jun 28, 2025  7:37 AM Renetta wrote:  Type:  Needs Medical Advice    Who Called: pt     Symptoms (please be specific): left knee and left shld pain     How long has patient had these symptoms:  1 day    Would the patient rather a call back or a response via [facility]? [facility]    Best Call Back Number: [phone] or [phone]    Additional Information: pt started flare up last night   Please call back to advise. Thanks!

## 2025-07-01 NOTE — TELEPHONE ENCOUNTER
Patient states she had sent a message on the evening of the 6/28/25 thinking that her RA was acting up. She could not take the knee pain so she went to the ER where she was diagnosed with Cellulitis of the right knee. She is taking Clindamycin at the present. She will discuss starting a new treatment plan for her rheumatoid arthritis at her August appointment. Will route this update to Dr Chakraborty to review.

## 2025-07-08 ENCOUNTER — RESULTS FOLLOW-UP (OUTPATIENT)
Dept: CARDIOLOGY | Facility: CLINIC | Age: 60
End: 2025-07-08
Payer: COMMERCIAL

## 2025-07-08 DIAGNOSIS — I50.20 HFREF (HEART FAILURE WITH REDUCED EJECTION FRACTION): Primary | ICD-10-CM

## 2025-07-09 ENCOUNTER — LAB VISIT (OUTPATIENT)
Dept: LAB | Facility: HOSPITAL | Age: 60
End: 2025-07-09
Payer: COMMERCIAL

## 2025-07-09 DIAGNOSIS — I50.20 HFREF (HEART FAILURE WITH REDUCED EJECTION FRACTION): ICD-10-CM

## 2025-07-09 LAB
ANION GAP (OHS): 5 MMOL/L (ref 8–16)
BUN SERPL-MCNC: 41 MG/DL (ref 6–20)
CALCIUM SERPL-MCNC: 10.2 MG/DL (ref 8.7–10.5)
CHLORIDE SERPL-SCNC: 105 MMOL/L (ref 95–110)
CO2 SERPL-SCNC: 30 MMOL/L (ref 23–29)
CREAT SERPL-MCNC: 1.9 MG/DL (ref 0.5–1.4)
GFR SERPLBLD CREATININE-BSD FMLA CKD-EPI: 30 ML/MIN/1.73/M2
GLUCOSE SERPL-MCNC: 193 MG/DL (ref 70–110)
POTASSIUM SERPL-SCNC: 4.9 MMOL/L (ref 3.5–5.1)
SODIUM SERPL-SCNC: 140 MMOL/L (ref 136–145)

## 2025-07-09 PROCEDURE — 36415 COLL VENOUS BLD VENIPUNCTURE: CPT | Mod: PN

## 2025-07-09 PROCEDURE — 80048 BASIC METABOLIC PNL TOTAL CA: CPT

## 2025-07-09 PROCEDURE — 83880 ASSAY OF NATRIURETIC PEPTIDE: CPT

## 2025-07-11 LAB — NT-PROBNP SERPL IA-MCNC: 1852 PG/ML

## 2025-07-14 ENCOUNTER — PATIENT MESSAGE (OUTPATIENT)
Dept: ADMINISTRATIVE | Facility: HOSPITAL | Age: 60
End: 2025-07-14
Payer: COMMERCIAL

## 2025-07-16 ENCOUNTER — DOCUMENT SCAN (OUTPATIENT)
Dept: HOME HEALTH SERVICES | Facility: HOSPITAL | Age: 60
End: 2025-07-16
Payer: COMMERCIAL

## 2025-07-17 ENCOUNTER — OFFICE VISIT (OUTPATIENT)
Dept: FAMILY MEDICINE | Facility: CLINIC | Age: 60
End: 2025-07-17
Payer: COMMERCIAL

## 2025-07-17 ENCOUNTER — APPOINTMENT (OUTPATIENT)
Dept: LAB | Facility: HOSPITAL | Age: 60
End: 2025-07-17
Attending: INTERNAL MEDICINE
Payer: COMMERCIAL

## 2025-07-17 ENCOUNTER — TELEPHONE (OUTPATIENT)
Dept: TRANSPLANT | Facility: CLINIC | Age: 60
End: 2025-07-17
Payer: COMMERCIAL

## 2025-07-17 VITALS
BODY MASS INDEX: 31.38 KG/M2 | DIASTOLIC BLOOD PRESSURE: 70 MMHG | SYSTOLIC BLOOD PRESSURE: 112 MMHG | WEIGHT: 177.13 LBS | OXYGEN SATURATION: 98 % | HEART RATE: 67 BPM | HEIGHT: 63 IN

## 2025-07-17 DIAGNOSIS — R79.89 ELEVATED BRAIN NATRIURETIC PEPTIDE (BNP) LEVEL: ICD-10-CM

## 2025-07-17 DIAGNOSIS — I50.20 HEART FAILURE WITH REDUCED EJECTION FRACTION: ICD-10-CM

## 2025-07-17 DIAGNOSIS — R01.1 HEART MURMUR: ICD-10-CM

## 2025-07-17 DIAGNOSIS — E03.9 HYPOTHYROIDISM, UNSPECIFIED TYPE: ICD-10-CM

## 2025-07-17 DIAGNOSIS — R60.9 FLUID RETENTION: ICD-10-CM

## 2025-07-17 DIAGNOSIS — Z00.00 WELLNESS EXAMINATION: ICD-10-CM

## 2025-07-17 DIAGNOSIS — Z90.81 ASPLENIA AFTER SURGICAL PROCEDURE: ICD-10-CM

## 2025-07-17 DIAGNOSIS — E78.2 DM TYPE 2 WITH DIABETIC MIXED HYPERLIPIDEMIA: ICD-10-CM

## 2025-07-17 DIAGNOSIS — N18.32 STAGE 3B CHRONIC KIDNEY DISEASE: Primary | ICD-10-CM

## 2025-07-17 DIAGNOSIS — E11.69 DM TYPE 2 WITH DIABETIC MIXED HYPERLIPIDEMIA: ICD-10-CM

## 2025-07-17 DIAGNOSIS — I47.10 PAROXYSMAL SVT (SUPRAVENTRICULAR TACHYCARDIA): ICD-10-CM

## 2025-07-17 DIAGNOSIS — D64.9 ANEMIA, UNSPECIFIED TYPE: ICD-10-CM

## 2025-07-17 DIAGNOSIS — I42.8 NON-ISCHEMIC CARDIOMYOPATHY: ICD-10-CM

## 2025-07-17 DIAGNOSIS — B33.24 VIRAL CARDIOMYOPATHY: ICD-10-CM

## 2025-07-17 PROCEDURE — 99999 PR PBB SHADOW E&M-EST. PATIENT-LVL IV: CPT | Mod: PBBFAC,,, | Performed by: INTERNAL MEDICINE

## 2025-07-17 RX ORDER — CETIRIZINE HYDROCHLORIDE 10 MG/1
10 TABLET ORAL DAILY
COMMUNITY

## 2025-07-17 RX ORDER — FUROSEMIDE 40 MG/1
40 TABLET ORAL DAILY
Qty: 90 TABLET | Refills: 2 | Status: SHIPPED | OUTPATIENT
Start: 2025-07-17

## 2025-07-17 NOTE — PROGRESS NOTES
Subjective:       Patient ID: Domi Leary is a 60 y.o. female.    Chief Complaint: Follow-up   HPI     History of Present Illness            Gyn: Dr Sanchez menopause   MM/21 discussed patient defers  Dexa: never / recommended Rx placed  Colonoscopy:  O 3/23 r/c 5 yr   Immunizations: Flu: D Tdap:  Prevnar 13: 2016 Shingles:  Recommend (needs updated vaccines) a splenium  Smoker:  Former  Derm: Dr Ryne PARRA psoriasis  Eye: O   Prev PCP Dr Nicole     F/u   TSH 0.9, LDL 72, A1c 9.4, hemoglobin 10.8 glucose 193, creatinine 1.9, albumin 2.8, CO2 30,    Knee right cellutitis: Er Emmons trace.  Resolving   Stayed 1 night - IV antibx and oral     Torn Rotor Cuff: partial tear.  Doing PT    Ortho Dr Craig     Lower extremity swelling improving off sodium bicarb.  Taking Lasix once daily.  Off spironolactone due to reduced GFR     Heart failure reduced EF 10 15% needs new echo  Paroxysmal SVT/atrial flutter:  Stable IVC filter removed              Mgmt Cardio Rudy Celeste MD      CKD Stage 3/4: currently Gfr 30.  Cr 1.9   Acute B DVT: US + nonocclusive thrombus mid right superficial femoral vein, nonocclusive thrombus left peroneal vein in the calf.    IVC filter removed     Type 2 diabetes:  uncontrolled   A1c  upto 9.4 new level pending today  /   Rx Glimepiride 2, Jardiance 25 (consider Ozempic if A1c still up)   previous Ozempic 1 mg, Glimepiride 4 Synjardy 1 q.d.  Prev A1c >14  // .  (SE: Metformin ER 1000 diarrhea. )     Hypothyroid:  mild low TSH 0.02 cut to 1/2 tab  and 1 tab other days.  Rx Levo 200 daily except S 1/2      Mixed HLD:  LDL goal < 70// controlled  Rx Pravastatin 10     Elevated liver enzymes: Cyclic   Chronic low albumin: Increase protein in diet      Rheumatoid arthritis: stable //off  Plaquenil 200, Leflunomide 20   Mgmt Rheum Dr Chakraborty             Iron Def w/oanemia.  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 approx age 43.  Needs updated vaccine.  Update meningitis today next visit needs meningitis B  Prev mgmt Heme  Dr Calle      Metabolic syndrome BMI 33 & 187, 31 & 176, 32/181-184, 29/187, 31/177           ____________________________________________________________________________________________________  Assessment & Plan:  1. Stage 3b chronic kidney disease    2. DM type 2 with diabetic mixed hyperlipidemia  - Hepatic Function Panel; Future    3. Elevated brain natriuretic peptide (BNP) level    4. Heart failure with reduced ejection fraction    5. Anemia, unspecified type  - CBC Without Differential; Future  - Iron and TIBC; Future    6. Heart murmur    7. Asplenia after surgical procedure  - mening vac A,C,Y,W135 dip (PF) (MENVEO) 10-5 mcg/0.5 mL vaccine (PREFERRED)(10 - 54 YO) 0.5 mL    8. Wellness examination  - CBC Without Differential; Future  - Iron and TIBC; Future  - Hepatic Function Panel; Future     Stage 3b chronic kidney disease    DM type 2 with diabetic mixed hyperlipidemia  -     Hepatic Function Panel; Future; Expected date: 07/17/2025    Elevated brain natriuretic peptide (BNP) level    Heart failure with reduced ejection fraction    Anemia, unspecified type  -     CBC Without Differential; Future; Expected date: 07/17/2025  -     Iron and TIBC; Future; Expected date: 07/17/2025    Heart murmur    Asplenia after surgical procedure  -     mening vac A,C,Y,W135 dip (PF) (MENVEO) 10-5 mcg/0.5 mL vaccine (PREFERRED)(10 - 54 YO) 0.5 mL    Wellness examination  -     CBC Without Differential; Future; Expected date: 07/17/2025  -     Iron and TIBC; Future; Expected date: 07/17/2025  -     Hepatic Function Panel; Future; Expected date: 07/17/2025        Continue to work on maintain healthy weight, balanced diet. Avoid unhealthy habits: smoking/vaping, excessive alcohol intake.     Recommend diet exercise:  High protein, low fat, low carb diet - calories women under <1200 & men <1800,  carbs <100 G, protein 50-60 G daily. Protein supplements to replace one meal.  Keep all beverages < 10 calories per serving. Keep snacks < 100 calories.   Recommend exercise 160 minutes per week, combo of cardio and weight/strength training.     Visit today included increased complexity associated with the care of the episodic problem addressed and managing the longitudinal care of the patient due to the serious and/or complex managed problem(s). HTN      Disclaimer: This note was partly generated using dictation software which may occasionally result in transcription errors  ____________________________________________________________________________________________________  Review of Systems:  Review of Systems      Negative     Objective:     Wt Readings from Last 3 Encounters:   07/17/25 80.3 kg (177 lb 2.2 oz)   06/12/25 72.6 kg (160 lb)   06/10/25 74.7 kg (164 lb 10.9 oz)     BP Readings from Last 3 Encounters:   07/17/25 112/70   06/16/25 (!) 112/57   06/10/25 (!) 106/58       Lab Results   Component Value Date    WBC 12.73 (H) 05/01/2025    HGB 10.8 (L) 05/01/2025    HCT 34.4 (L) 05/01/2025     05/01/2025     07/09/2025    K 4.9 07/09/2025     07/09/2025    ALT 24 05/01/2025    AST 20 03/31/2025    CO2 30 (H) 07/09/2025    CREATININE 1.9 (H) 07/09/2025    BUN 41 (H) 07/09/2025     (H) 07/09/2025      Hemoglobin A1C   Date Value Ref Range Status   02/06/2025 7.9 (H) 4.7 - 5.6 % Final   08/02/2024 7.7 (H) 4.0 - 5.6 % Final     Comment:     ADA Screening Guidelines:  5.7-6.4%  Consistent with prediabetes  >or=6.5%  Consistent with diabetes    High levels of fetal hemoglobin interfere with the HbA1C  assay. Heterozygous hemoglobin variants (HbS, HgC, etc)do  not significantly interfere with this assay.   However, presence of multiple variants may affect accuracy.     02/06/2024 7.4 (H) 4.0 - 5.6 % Final     Comment:     ADA Screening Guidelines:  5.7-6.4%  Consistent with  prediabetes  >or=6.5%  Consistent with diabetes    High levels of fetal hemoglobin interfere with the HbA1C  assay. Heterozygous hemoglobin variants (HbS, HgC, etc)do  not significantly interfere with this assay.   However, presence of multiple variants may affect accuracy.     05/08/2023 7.8 (H) 4.0 - 5.6 % Final     Comment:     ADA Screening Guidelines:  5.7-6.4%  Consistent with prediabetes  >or=6.5%  Consistent with diabetes    High levels of fetal hemoglobin interfere with the HbA1C  assay. Heterozygous hemoglobin variants (HbS, HgC, etc)do  not significantly interfere with this assay.   However, presence of multiple variants may affect accuracy.       Hemoglobin A1c   Date Value Ref Range Status   05/01/2025 9.4 (H) 4.0 - 5.6 % Final     Comment:     ADA Screening Guidelines:  5.7-6.4%  Consistent with prediabetes  >=6.5%  Consistent with diabetes    High levels of fetal hemoglobin interfere with the HbA1C  assay. Heterozygous hemoglobin variants (HbS, HgC, etc)do  not significantly interfere with this assay.   However, presence of multiple variants may affect accuracy.      Lab Results   Component Value Date    TSH 0.090 (L) 04/24/2025    TSH 46.696 (H) 03/19/2025    TSH 14.62 (H) 02/25/2025     Lab Results   Component Value Date    FREET4 1.78 (H) 04/24/2025    FREET4 0.94 03/19/2025    FREET4 0.86 08/02/2024     Lab Results   Component Value Date    LDLCALC 72.2 05/01/2025    LDLCALC 63.0 03/19/2025    LDLCALC 32 02/05/2025     Lab Results   Component Value Date    TRIG 64 05/01/2025    TRIG 95 03/19/2025    TRIG 102 02/05/2025            Physical Exam  Constitutional:       Appearance: Normal appearance.   HENT:      Head: Normocephalic and atraumatic.   Eyes:      Extraocular Movements: Extraocular movements intact.      Pupils: Pupils are equal, round, and reactive to light.   Cardiovascular:      Rate and Rhythm: Normal rate and regular rhythm.      Heart sounds: Murmur heard.   Pulmonary:      Effort:  Pulmonary effort is normal.      Breath sounds: Normal breath sounds.   Musculoskeletal:      Right lower leg: No edema.      Left lower leg: No edema.   Neurological:      Mental Status: She is alert and oriented to person, place, and time.   Psychiatric:         Mood and Affect: Mood normal.             Medication List with Changes/Refills   Current Medications    AMIODARONE (PACERONE) 200 MG TAB    Take 0.5 tablets (100 mg total) by mouth once daily.    BLOOD SUGAR DIAGNOSTIC STRP    To check BG 1 times daily, to use with insurance preferred meter    CETIRIZINE (ZYRTEC) 10 MG TABLET    Take 10 mg by mouth once daily.    EMPAGLIFLOZIN (JARDIANCE) 25 MG TABLET    Take 1 tablet (25 mg total) by mouth once daily.    FUROSEMIDE (LASIX) 40 MG TABLET    TAKE 1 TABLET BY MOUTH IN THE MORNING DAILY & 1/2 TABLET AT NOON X 3 DAYS AS NEEDED FOR SWELLING    GLIMEPIRIDE (AMARYL) 2 MG TABLET    TAKE 1 TABLET (2 MG TOTAL) BY MOUTH BEFORE BREAKFAST    INSULIN ASPART U-100 (NOVOLOG FLEXPEN U-100 INSULIN) 100 UNIT/ML (3 ML) INPN PEN    Inject 5 Units into the skin 3 (three) times daily with meals. ((PRN sliding scale -199: 2 units  -249: 4 units  -299: 7 units  -349: 10 units  BG Over 350: 12 units )) Max daily dose 25 units    LANCETS MISC    To check BG 1 times daily, to use with insurance preferred meter    LEVOTHYROXINE (SYNTHROID) 200 MCG TABLET    1 tab po 6 x wkly & 1/2 tab po Sunday    LOSARTAN (COZAAR) 25 MG TABLET    Take 1 tablet (25 mg total) by mouth once daily.    METOPROLOL SUCCINATE (TOPROL-XL) 200 MG 24 HR TABLET    Take 1 tablet (200 mg total) by mouth once daily.    MULTIVITAMIN WITH FOLIC ACID 400 MCG TAB    Take 1 tablet by mouth once daily.    PRAVASTATIN (PRAVACHOL) 10 MG TABLET    Take 1 tablet (10 mg total) by mouth once daily.    TRAMADOL (ULTRAM) 50 MG TABLET    Take 1 tablet (50 mg total) by mouth every 12 (twelve) hours as needed for Pain (rheumatoid arthritis).    TRIAMCINOLONE  ACETONIDE 0.1% (KENALOG) 0.1 % OINTMENT    Apply topically 2 (two) times daily.   Discontinued Medications    PANTOPRAZOLE (PROTONIX) 40 MG TABLET    Take 40 mg by mouth once daily.    POTASSIUM CHLORIDE (KLOR-CON) 10 MEQ TBSR    Take 10 mEq by mouth once.    SPIRONOLACTONE (ALDACTONE) 25 MG TABLET    Take 25 mg by mouth once daily.

## 2025-07-18 ENCOUNTER — EXTERNAL HOME HEALTH (OUTPATIENT)
Dept: HOME HEALTH SERVICES | Facility: HOSPITAL | Age: 60
End: 2025-07-18
Payer: COMMERCIAL

## 2025-07-19 DIAGNOSIS — I50.20 HEART FAILURE WITH REDUCED EJECTION FRACTION: ICD-10-CM

## 2025-07-19 RX ORDER — FUROSEMIDE 40 MG/1
TABLET ORAL
Qty: 135 TABLET | OUTPATIENT
Start: 2025-07-19

## 2025-07-19 NOTE — TELEPHONE ENCOUNTER
Refill Decision Note   Domi Leary  is requesting a refill authorization.  Brief Assessment and Rationale for Refill:  Quick Discontinue     Medication Therapy Plan:  Receipt confirmed by pharmacy (7/17/2025  2:04 PM CDT)      Comments:     Note composed:12:11 PM 07/19/2025

## 2025-07-21 ENCOUNTER — LAB VISIT (OUTPATIENT)
Dept: LAB | Facility: HOSPITAL | Age: 60
End: 2025-07-21
Attending: INTERNAL MEDICINE
Payer: COMMERCIAL

## 2025-07-21 ENCOUNTER — OFFICE VISIT (OUTPATIENT)
Dept: TRANSPLANT | Facility: CLINIC | Age: 60
End: 2025-07-21
Payer: COMMERCIAL

## 2025-07-21 VITALS
HEIGHT: 63 IN | BODY MASS INDEX: 31.36 KG/M2 | SYSTOLIC BLOOD PRESSURE: 128 MMHG | DIASTOLIC BLOOD PRESSURE: 63 MMHG | WEIGHT: 177 LBS | HEART RATE: 68 BPM

## 2025-07-21 DIAGNOSIS — I50.20 HFREF (HEART FAILURE WITH REDUCED EJECTION FRACTION): Primary | ICD-10-CM

## 2025-07-21 DIAGNOSIS — I50.20 HFREF (HEART FAILURE WITH REDUCED EJECTION FRACTION): ICD-10-CM

## 2025-07-21 DIAGNOSIS — I50.22 CHRONIC SYSTOLIC HEART FAILURE: ICD-10-CM

## 2025-07-21 LAB
ABSOLUTE EOSINOPHIL (OHS): 0.46 K/UL
ABSOLUTE MONOCYTE (OHS): 1.14 K/UL (ref 0.3–1)
ABSOLUTE NEUTROPHIL COUNT (OHS): 5.44 K/UL (ref 1.8–7.7)
ALBUMIN SERPL BCP-MCNC: 3.6 G/DL (ref 3.5–5.2)
ALP SERPL-CCNC: 207 UNIT/L (ref 40–150)
ALT SERPL W/O P-5'-P-CCNC: 141 UNIT/L (ref 10–44)
ANION GAP (OHS): 9 MMOL/L (ref 8–16)
AST SERPL-CCNC: 72 UNIT/L (ref 11–45)
BASOPHILS # BLD AUTO: 0.12 K/UL
BASOPHILS NFR BLD AUTO: 0.9 %
BILIRUB SERPL-MCNC: 0.4 MG/DL (ref 0.1–1)
BNP SERPL-MCNC: 167 PG/ML (ref 0–99)
BUN SERPL-MCNC: 53 MG/DL (ref 6–20)
CALCIUM SERPL-MCNC: 10.1 MG/DL (ref 8.7–10.5)
CHLORIDE SERPL-SCNC: 102 MMOL/L (ref 95–110)
CO2 SERPL-SCNC: 29 MMOL/L (ref 23–29)
CREAT SERPL-MCNC: 2 MG/DL (ref 0.5–1.4)
ERYTHROCYTE [DISTWIDTH] IN BLOOD BY AUTOMATED COUNT: 15.9 % (ref 11.5–14.5)
GFR SERPLBLD CREATININE-BSD FMLA CKD-EPI: 28 ML/MIN/1.73/M2
GLUCOSE SERPL-MCNC: 222 MG/DL (ref 70–110)
HCT VFR BLD AUTO: 39.8 % (ref 37–48.5)
HGB BLD-MCNC: 12.5 GM/DL (ref 12–16)
IMM GRANULOCYTES # BLD AUTO: 0.03 K/UL (ref 0–0.04)
IMM GRANULOCYTES NFR BLD AUTO: 0.2 % (ref 0–0.5)
LYMPHOCYTES # BLD AUTO: 5.5 K/UL (ref 1–4.8)
MCH RBC QN AUTO: 29.1 PG (ref 27–31)
MCHC RBC AUTO-ENTMCNC: 31.4 G/DL (ref 32–36)
MCV RBC AUTO: 93 FL (ref 82–98)
NUCLEATED RBC (/100WBC) (OHS): 0 /100 WBC
PLATELET # BLD AUTO: 302 K/UL (ref 150–450)
PMV BLD AUTO: 11.1 FL (ref 9.2–12.9)
POTASSIUM SERPL-SCNC: 4.6 MMOL/L (ref 3.5–5.1)
PROT SERPL-MCNC: 7.8 GM/DL (ref 6–8.4)
RBC # BLD AUTO: 4.3 M/UL (ref 4–5.4)
RELATIVE EOSINOPHIL (OHS): 3.6 %
RELATIVE LYMPHOCYTE (OHS): 43.3 % (ref 18–48)
RELATIVE MONOCYTE (OHS): 9 % (ref 4–15)
RELATIVE NEUTROPHIL (OHS): 43 % (ref 38–73)
SODIUM SERPL-SCNC: 140 MMOL/L (ref 136–145)
WBC # BLD AUTO: 12.69 K/UL (ref 3.9–12.7)

## 2025-07-21 PROCEDURE — 3078F DIAST BP <80 MM HG: CPT | Mod: CPTII,S$GLB,, | Performed by: INTERNAL MEDICINE

## 2025-07-21 PROCEDURE — 3046F HEMOGLOBIN A1C LEVEL >9.0%: CPT | Mod: CPTII,S$GLB,, | Performed by: INTERNAL MEDICINE

## 2025-07-21 PROCEDURE — 99204 OFFICE O/P NEW MOD 45 MIN: CPT | Mod: S$GLB,,, | Performed by: INTERNAL MEDICINE

## 2025-07-21 PROCEDURE — 3061F NEG MICROALBUMINURIA REV: CPT | Mod: CPTII,S$GLB,, | Performed by: INTERNAL MEDICINE

## 2025-07-21 PROCEDURE — 3066F NEPHROPATHY DOC TX: CPT | Mod: CPTII,S$GLB,, | Performed by: INTERNAL MEDICINE

## 2025-07-21 PROCEDURE — 3074F SYST BP LT 130 MM HG: CPT | Mod: CPTII,S$GLB,, | Performed by: INTERNAL MEDICINE

## 2025-07-21 PROCEDURE — 1159F MED LIST DOCD IN RCRD: CPT | Mod: CPTII,S$GLB,, | Performed by: INTERNAL MEDICINE

## 2025-07-21 PROCEDURE — 85025 COMPLETE CBC W/AUTO DIFF WBC: CPT

## 2025-07-21 PROCEDURE — 3008F BODY MASS INDEX DOCD: CPT | Mod: CPTII,S$GLB,, | Performed by: INTERNAL MEDICINE

## 2025-07-21 PROCEDURE — 84295 ASSAY OF SERUM SODIUM: CPT

## 2025-07-21 PROCEDURE — 36415 COLL VENOUS BLD VENIPUNCTURE: CPT

## 2025-07-21 PROCEDURE — 4010F ACE/ARB THERAPY RXD/TAKEN: CPT | Mod: CPTII,S$GLB,, | Performed by: INTERNAL MEDICINE

## 2025-07-21 PROCEDURE — 99999 PR PBB SHADOW E&M-EST. PATIENT-LVL V: CPT | Mod: PBBFAC,,, | Performed by: INTERNAL MEDICINE

## 2025-07-21 PROCEDURE — 83880 ASSAY OF NATRIURETIC PEPTIDE: CPT

## 2025-07-21 NOTE — PROGRESS NOTES
[facility] Initial evaluation.      Attending Physician: Keith Flynn MD.  The patient's last visit with me was on Visit date not found.         HPI.  Domi Leary is a 59 y.o. female with  PMH rheumatoid DM2, HLD, seropositive rheumatoid arthritis, Felty syndrome with splenectomy, hypothyroidism who presented to the [facility] ED initially on 2/5/2025 for 4-day history of flu-like symptoms, including fever, SOB, productive cough, vomiting, and an inability tolerate solid food. She was admitted to [facility] MICU on 2/5/25 for sepsis with multiorgan failure. Echo demonstrated new onset HFrEF with severe LV dysfunction (EF of 10-15%), requiring right heart cath on 2/6 to assess hemodynamic status, determined to be mixed distributive/cardiogenic shock. The patient was also noted to have worsening renal function, with Cr elevation from baseline of 0.7. Patient was transferred to CCU on 2/7, started on dobutamine initially but switched to amiodarone after developing SVT. Renal function continued to worsen requiring HD on 2/11, 2/12, 2/14. Patient hospital stay was complicated after  a spontaneous retroperitoneal bleeding, anterior branch of left internal illiac with unknown source by IR in 2/11. Hgb decrease from 9 to 7 overnight of 2/10-2/11, given 3 units pRBC on 2/11 and 2 units on 2/12. Bilateral US consistent with nonocclusive thrombus in the mid right superficial femoral vein, and nonocclusive thrombus in the left peroneal vein in the calf.     Patient was stepped down to the floor from CCU on 2/14. After reviewing risks and benefits patient decided for an IVC filter vs undergoing heparin re-challenged. Patient IVC filter was placed on 2/19. Patient then converted back on sustained SVT that was not controlled with metoprolol, amiodarone, and pushes of adenosine. Patient underwent  cardioversion 2/27 converting back to sinus rhythm. As part of the work-up for SVT, she underwent a V/Q on 2/24 showing multiple segmental defects in lower lobes of lung bilaterally. Based on shared decisions between patient and  cardiology, patient will continue to hold heparin gtt for now.      In addition, when patient initially presented to the hospital, she was found in iatrogenic hyperthyroidism. Patient was on home dose synthroid 200 mg. She was then discontinued, and started at a lower dose Synthroid 200 mg daily on 2/16. Inpatient endocrinology recommending re-check of labs within 6 -8 weeks after re-starting Synthroid.      Patient was stable for discharge on 2/28. Patient did not meet criteria for SNF since she was able to walk more than 250 ft. When was discharge home with PT/OT follow-up. Patient should have follow-up appointment for IV filter removal after 3 months of placement, and ongoing discussion of anticoagulation.     July 21, 2025: she comes for the first time to see me. She feels well. She denies symptoms. She has been feeling very well lately. She is doing pool exercise.        Review of Systems   Constitutional:  Negative for chills, diaphoresis and fever.   HENT:  Negative for nasal congestion, rhinorrhea and sore throat.    Eyes:  Negative for visual disturbance.   Cardiovascular:  Negative for chest pain.   Gastrointestinal:  Negative for abdominal pain, diarrhea, nausea and vomiting.   Genitourinary:  Negative for difficulty urinating, dysuria and hematuria.   Integumentary:  Negative for rash.   Neurological:  Negative for seizures, syncope and light-headedness.   Psychiatric/Behavioral:  Negative for agitation and hallucinations.         Past Medical History:   Diagnosis Date    Diabetes 02/20/2012    H/O blood clots 02/2025    lungs    Hypertension     Hypothyroid 02/20/2012    Influenza 02/2025    hospitalized    Rheumatoid arthritis 02/20/2012        Past Surgical History:    Procedure Laterality Date     SECTION      1    COLONOSCOPY N/A 2023    Procedure: COLONOSCOPY;  Surgeon: Rinku Olmstead MD;  Location[facility] ENDO;  Service: Endoscopy;  Laterality: N/A;    COSMETIC SURGERY      breast augmentation and removal    AYALA FILTER PLACEMENT  2025    HERNIA REPAIR      left LUQ incision    IVC FILTER RETRIEVAL N/A 2025    Procedure: REMOVAL-FILTER-IVC;  Surgeon: Elizabeth Maddox MD;  Location: [facility] CATH/EP LAB;  Service: Vascular;  Laterality: N/A;    SPLENECTOMY, TOTAL      age 43 // main O    TONSILLECTOMY          Family History   Problem Relation Name Age of Onset    No Known Problems Mother      Ulcerative colitis Father      Cataracts Paternal Grandmother      Diabetes Maternal Aunt      Breast cancer Maternal Aunt      Glaucoma Neg Hx      Macular degeneration Neg Hx      Retinal detachment Neg Hx          Review of patient's allergies indicates:   Allergen Reactions    Enbrel [etanercept] Rash     psoriasis    Amoxil [amoxicillin] Hives        Current Outpatient Medications   Medication Instructions    amiodarone (PACERONE) 100 mg, Oral, Daily    blood sugar diagnostic Strp To check BG 1 times daily, to use with insurance preferred meter    cetirizine (ZYRTEC) 10 mg, Daily    empagliflozin (JARDIANCE) 25 mg, Oral, Daily    furosemide (LASIX) 40 mg, Oral, Daily    glimepiride (AMARYL) 2 mg, Oral, Before breakfast    insulin aspart U-100 (NOVOLOG FLEXPEN U-100 INSULIN) 5 Units, Subcutaneous, 3 times daily with meals, ((PRN sliding scale -199: 2 units  -249: 4 units  -299: 7 units  -349: 10 units  BG Over 350: 12 units )) Max daily dose 25 units    lancets Misc To check BG 1 times daily, to use with insurance preferred meter    levothyroxine (SYNTHROID, LEVOTHROID) 175 mcg, Oral, Before breakfast    losartan (COZAAR) 25 mg, Oral, Daily    metoprolol succinate (TOPROL-XL) 200 mg, Oral, Daily    multivitamin with folic acid 400  "mcg Tab 1 tablet, Daily    OZEMPIC 0.5 mg, Subcutaneous, Every 7 days, 0.25 mg  SQ every 7 days for 4 wks, then 0.5 mg SQ Q 7 days    pravastatin (PRAVACHOL) 10 mg, Oral, Daily    traMADoL (ULTRAM) 50 mg, Oral, Every 12 hours PRN    triamcinolone acetonide 0.1% (KENALOG) 0.1 % ointment Topical (Top), 2 times daily        Vitals:    07/21/25 1341   BP: 128/63   Pulse: 68        Wt Readings from Last 3 Encounters:   07/21/25 80.3 kg (177 lb 0.5 oz)   07/17/25 80.3 kg (177 lb 2.2 oz)   06/12/25 72.6 kg (160 lb)     Temp Readings from Last 3 Encounters:   03/23/23 98.4 °F (36.9 °C) (Skin)   03/09/21 98 °F (36.7 °C)   11/29/18 98.2 °F (36.8 °C) (Oral)     BP Readings from Last 3 Encounters:   07/21/25 128/63   07/17/25 112/70   06/16/25 (!) 112/57     Pulse Readings from Last 3 Encounters:   07/21/25 68   07/17/25 67   06/16/25 (!) 55        Body mass index is 31.36 kg/m². Estimated body surface area is 1.89 meters squared as calculated from the following:    Height as of this encounter: 5' 3" (1.6 m).    Weight as of this encounter: 80.3 kg (177 lb 0.5 oz).     Physical Exam  Constitutional:       Appearance: She is well-developed.   HENT:      Head: Normocephalic and atraumatic.      Right Ear: External ear normal.      Left Ear: External ear normal.   Eyes:      Conjunctiva/sclera: Conjunctivae normal.      Pupils: Pupils are equal, round, and reactive to light.   Neck:      Vascular: No hepatojugular reflux or JVD.   Cardiovascular:      Rate and Rhythm: Normal rate and regular rhythm.      Pulses: Intact distal pulses.      Heart sounds: S1 normal and S2 normal. No murmur heard.     No friction rub. No gallop.   Pulmonary:      Effort: Pulmonary effort is normal.      Breath sounds: Normal breath sounds.   Abdominal:      General: Bowel sounds are normal. There is no distension.      Palpations: Abdomen is soft.      Tenderness: There is no abdominal tenderness. There is no guarding or rebound.   Musculoskeletal:    "   Cervical back: Normal range of motion and neck supple.      Right lower leg: No edema.      Left lower leg: No edema.   Neurological:      Mental Status: She is alert and oriented to person, place, and time.          Lab Results   Component Value Date     (H) 05/01/2025     07/17/2025     06/16/2025    K 4.4 07/17/2025    K 4.1 06/16/2025    MG 1.9 05/01/2025     07/17/2025     06/16/2025    CO2 24 07/17/2025    CO2 25 06/16/2025    BUN 54 (H) 07/17/2025    CREATININE 1.9 (H) 07/17/2025     (H) 07/17/2025     (H) 06/16/2025    HGBA1C 9.9 (H) 07/17/2025    HGBA1C 7.9 (H) 02/06/2025    HGBA1C 7.7 (H) 08/02/2024    AST 59 (H) 07/17/2025    AST 20 03/19/2025    ALT 81 (H) 07/17/2025    ALT 15 03/19/2025    ALBUMIN 3.8 07/17/2025    ALBUMIN 2.5 (L) 03/19/2025    PROT 8.2 07/17/2025    PROT 7.3 03/19/2025    BILITOT 0.3 07/17/2025    BILITOT 1.0 03/19/2025    WBC 11.82 07/17/2025    HGB 12.6 07/17/2025    HGB 10.5 (L) 03/19/2025    HCT 40.8 07/17/2025    HCT 35.3 (L) 03/19/2025     07/17/2025     03/19/2025    INR 1.2 02/14/2025     06/23/2011    TSH 0.019 (L) 07/17/2025    TSH 46.696 (H) 03/19/2025    CHOL 133 05/01/2025    CHOL 143 03/19/2025    HDL 48 05/01/2025    LDLCALC 72.2 05/01/2025    TRIG 64 05/01/2025    TRIG 95 03/19/2025    FREET4 1.52 (H) 07/17/2025         Results for orders placed during the hospital encounter of 06/16/25    Cardiac catheterization    Narrative  Procedure performed in the Invasive Lab  - See Procedure Log link below for nursing documentation  - See OpNote on Surgeries Tab for physician findings  - See Imaging Tab for radiologist dictation         Assessment and Plan:  HFrEF (heart failure with reduced ejection fraction)  -     Ambulatory referral/consult to Adult Advanced Heart Failure        Chronic systolic HF, NYHA class III, stage C.  Etiology: NICM, had LHC when first diagnosed at OSH showing no CAD.  There was  "sepsis and uncontrolled hyperthyroidism at the time of diagnosis. Most recently TSH has still been suppressed and dose adjustment is under way.  Devices: none.  Medications: metoprolol succinate 200 mg daily, losartan 25 mg daily, jardiance. Furosemide 40 mg daily.  Hemodynamic status: warm, normotensive, euvolemic.  Clinical course: recent diagnosis February 2025. Complicated course by renal failure. Now improved.  Plan:  -echo asap (none in our institution).  -no changes on meds  -would favor decreasing dose of lasix if "dry" on echo but trying to maintain ARB.  -Ordering blood tests today, adding BNP.  -Thyroid disorder needs to be optimally controlled/treated to allow for heart recovery.    I discussed with patient importance of:  -sodium and fluid limits  -medication compliance and up titration when possible.  -Exercise.  -no alcohol, tobacco or drugs (she does not use these).    F/u in 3 months with labs.      2. Hypothyroidism on levothyroxine. F/u with Endo.        "

## 2025-07-21 NOTE — PATIENT INSTRUCTIONS
You have just the right amount of fluid on you.  Please adhere to a low sodium diet (no more than 1.5 grams of sodium in 24h).  3.   Follow fluid restriction of  1. no more than 2 liters in 24 hours..   4.  Echo asap.  5. Blood tests today.  6. F/u in 3 months with labs.

## 2025-07-22 ENCOUNTER — DOCUMENT SCAN (OUTPATIENT)
Dept: HOME HEALTH SERVICES | Facility: HOSPITAL | Age: 60
End: 2025-07-22
Payer: COMMERCIAL

## 2025-08-04 ENCOUNTER — HOSPITAL ENCOUNTER (OUTPATIENT)
Dept: CARDIOLOGY | Facility: HOSPITAL | Age: 60
Discharge: HOME | End: 2025-08-04
Attending: INTERNAL MEDICINE
Payer: COMMERCIAL

## 2025-08-04 VITALS
DIASTOLIC BLOOD PRESSURE: 64 MMHG | WEIGHT: 177 LBS | HEART RATE: 68 BPM | SYSTOLIC BLOOD PRESSURE: 125 MMHG | HEIGHT: 63 IN | BODY MASS INDEX: 31.36 KG/M2

## 2025-08-04 DIAGNOSIS — I50.20 HFREF (HEART FAILURE WITH REDUCED EJECTION FRACTION): ICD-10-CM

## 2025-08-04 LAB
AORTIC SIZE INDEX (SOV): 1.5 CM/M2
AORTIC SIZE INDEX: 1.5 CM/M2
ASCENDING AORTA: 2.8 CM
AV AREA BY CONTINUOUS VTI: 2.8 CM2
AV INDEX (PROSTH): 0.76
AV LVOT MEAN GRADIENT: 3 MMHG
AV LVOT PEAK GRADIENT: 5 MMHG
AV MEAN GRADIENT: 5 MMHG
AV PEAK GRADIENT: 9 MMHG
AV VALVE AREA BY VELOCITY RATIO: 2.8 CM²
AV VALVE AREA: 2.9 CM2
AV VELOCITY RATIO: 0.73
BSA FOR ECHO PROCEDURE: 1.89 M2
CV ECHO LV RWT: 0.25 CM
DOP CALC AO PEAK VEL: 1.5 M/S
DOP CALC AO VTI: 30.5 CM
DOP CALC LVOT AREA: 3.8 CM2
DOP CALC LVOT DIAMETER: 2.2 CM
DOP CALC LVOT PEAK VEL: 1.1 M/S
DOP CALCLVOT PEAK VEL VTI: 23.3 CM
E WAVE DECELERATION TIME: 181 MS
E/A RATIO: 0.84
E/E' RATIO: 9 M/S
ECHO EF ESTIMATED: 55 %
ECHO LV POSTERIOR WALL: 0.7 CM (ref 0.6–1.1)
EJECTION FRACTION: 53 %
FRACTIONAL SHORTENING: 28.1 % (ref 28–44)
INTERVENTRICULAR SEPTUM: 0.7 CM (ref 0.6–1.1)
IVRT: 83 MS
LA MAJOR: 5.2 CM
LA MINOR: 5.2 CM
LA WIDTH: 3.5 CM
LEFT ATRIUM SIZE: 3.9 CM
LEFT ATRIUM VOLUME INDEX MOD: 34 ML/M2
LEFT ATRIUM VOLUME INDEX: 33 ML/M2
LEFT ATRIUM VOLUME MOD: 63 ML
LEFT ATRIUM VOLUME: 60 CM3
LEFT INTERNAL DIMENSION IN SYSTOLE: 4.1 CM (ref 2.1–4)
LEFT VENTRICLE DIASTOLIC VOLUME INDEX: 86.96 ML/M2
LEFT VENTRICLE DIASTOLIC VOLUME: 160 ML
LEFT VENTRICLE MASS INDEX: 78.4 G/M2
LEFT VENTRICLE SYSTOLIC VOLUME INDEX: 39.1 ML/M2
LEFT VENTRICLE SYSTOLIC VOLUME: 72 ML
LEFT VENTRICULAR INTERNAL DIMENSION IN DIASTOLE: 5.7 CM (ref 3.5–6)
LEFT VENTRICULAR MASS: 144.3 G
LV LATERAL E/E' RATIO: 7.9
LV SEPTAL E/E' RATIO: 11.8
Lab: 1.7 CM/M
Lab: 1.7 CM/M
MV A" WAVE DURATION": 82.78 MS
MV PEAK A VEL: 0.85 M/S
MV PEAK E VEL: 0.71 M/S
OHS CV CPX PATIENT HEIGHT IN: 63
OHS CV RV/LV RATIO: 0.7 CM
PISA TR MAX VEL: 2.3 M/S
PULM VEIN A" WAVE DURATION": 82.78 MS
PULM VEIN S/D RATIO: 1.29
PULMONIC VEIN PEAK A VELOCITY: 0.2 M/S
PV PEAK D VEL: 0.65 M/S
PV PEAK S VEL: 0.84 M/S
RA MAJOR: 4.84 CM
RA PRESSURE ESTIMATED: 3 MMHG
RA WIDTH: 4.26 CM
RIGHT ATRIAL AREA: 17.9 CM2
RIGHT VENTRICLE DIASTOLIC BASEL DIMENSION: 4 CM
RV TB RVSP: 5 MMHG
RV TISSUE DOPPLER FREE WALL SYSTOLIC VELOCITY 1 (APICAL 4 CHAMBER VIEW): 14.03 CM/S
SINUS: 2.7 CM
STJ: 2.3 CM
TDI LATERAL: 0.09 M/S
TDI SEPTAL: 0.06 M/S
TDI: 0.08 M/S
TRICUSPID ANNULAR PLANE SYSTOLIC EXCURSION: 1.9 CM
TV PEAK GRADIENT: 21 MMHG
TV REST PULMONARY ARTERY PRESSURE: 24 MMHG
Z-SCORE OF LEFT VENTRICULAR DIMENSION IN END DIASTOLE: 1.1
Z-SCORE OF LEFT VENTRICULAR DIMENSION IN END SYSTOLE: 2.08

## 2025-08-04 PROCEDURE — 93306 TTE W/DOPPLER COMPLETE: CPT

## 2025-08-04 PROCEDURE — 93306 TTE W/DOPPLER COMPLETE: CPT | Mod: 26,,, | Performed by: INTERNAL MEDICINE

## 2025-08-06 ENCOUNTER — DOCUMENT SCAN (OUTPATIENT)
Dept: HOME HEALTH SERVICES | Facility: HOSPITAL | Age: 60
End: 2025-08-06
Payer: COMMERCIAL

## 2025-08-07 ENCOUNTER — OFFICE VISIT (OUTPATIENT)
Dept: NEPHROLOGY | Facility: CLINIC | Age: 60
End: 2025-08-07
Payer: COMMERCIAL

## 2025-08-07 VITALS
DIASTOLIC BLOOD PRESSURE: 66 MMHG | HEIGHT: 63 IN | HEART RATE: 76 BPM | BODY MASS INDEX: 31.36 KG/M2 | SYSTOLIC BLOOD PRESSURE: 118 MMHG | OXYGEN SATURATION: 96 %

## 2025-08-07 DIAGNOSIS — E11.40 TYPE 2 DIABETES MELLITUS WITH DIABETIC NEUROPATHY, WITHOUT LONG-TERM CURRENT USE OF INSULIN: ICD-10-CM

## 2025-08-07 DIAGNOSIS — I50.42 CHRONIC COMBINED SYSTOLIC AND DIASTOLIC CONGESTIVE HEART FAILURE: ICD-10-CM

## 2025-08-07 DIAGNOSIS — M05.79 SEROPOSITIVE RHEUMATOID ARTHRITIS OF MULTIPLE SITES: ICD-10-CM

## 2025-08-07 DIAGNOSIS — N18.32 STAGE 3B CHRONIC KIDNEY DISEASE: Primary | ICD-10-CM

## 2025-08-07 DIAGNOSIS — I10 ESSENTIAL HYPERTENSION: ICD-10-CM

## 2025-08-07 DIAGNOSIS — E66.9 OBESITY (BMI 30-39.9): ICD-10-CM

## 2025-08-07 PROCEDURE — 3061F NEG MICROALBUMINURIA REV: CPT | Mod: CPTII,S$GLB,, | Performed by: STUDENT IN AN ORGANIZED HEALTH CARE EDUCATION/TRAINING PROGRAM

## 2025-08-07 PROCEDURE — 3046F HEMOGLOBIN A1C LEVEL >9.0%: CPT | Mod: CPTII,S$GLB,, | Performed by: STUDENT IN AN ORGANIZED HEALTH CARE EDUCATION/TRAINING PROGRAM

## 2025-08-07 PROCEDURE — 3008F BODY MASS INDEX DOCD: CPT | Mod: CPTII,S$GLB,, | Performed by: STUDENT IN AN ORGANIZED HEALTH CARE EDUCATION/TRAINING PROGRAM

## 2025-08-07 PROCEDURE — 99999 PR PBB SHADOW E&M-EST. PATIENT-LVL III: CPT | Mod: PBBFAC,,, | Performed by: STUDENT IN AN ORGANIZED HEALTH CARE EDUCATION/TRAINING PROGRAM

## 2025-08-07 PROCEDURE — 3078F DIAST BP <80 MM HG: CPT | Mod: CPTII,S$GLB,, | Performed by: STUDENT IN AN ORGANIZED HEALTH CARE EDUCATION/TRAINING PROGRAM

## 2025-08-07 PROCEDURE — 3074F SYST BP LT 130 MM HG: CPT | Mod: CPTII,S$GLB,, | Performed by: STUDENT IN AN ORGANIZED HEALTH CARE EDUCATION/TRAINING PROGRAM

## 2025-08-07 PROCEDURE — 1159F MED LIST DOCD IN RCRD: CPT | Mod: CPTII,S$GLB,, | Performed by: STUDENT IN AN ORGANIZED HEALTH CARE EDUCATION/TRAINING PROGRAM

## 2025-08-07 PROCEDURE — 4010F ACE/ARB THERAPY RXD/TAKEN: CPT | Mod: CPTII,S$GLB,, | Performed by: STUDENT IN AN ORGANIZED HEALTH CARE EDUCATION/TRAINING PROGRAM

## 2025-08-07 PROCEDURE — 99214 OFFICE O/P EST MOD 30 MIN: CPT | Mod: S$GLB,,, | Performed by: STUDENT IN AN ORGANIZED HEALTH CARE EDUCATION/TRAINING PROGRAM

## 2025-08-07 PROCEDURE — 3066F NEPHROPATHY DOC TX: CPT | Mod: CPTII,S$GLB,, | Performed by: STUDENT IN AN ORGANIZED HEALTH CARE EDUCATION/TRAINING PROGRAM

## 2025-08-07 NOTE — PROGRESS NOTES
"[facility]  Nephrology Clinic      Subjective:       HPI ID: Domi Leary is a 60 y.o. female who returns for ongoing evaluation and management of CKD.     She is pertinent medical history of seropositive rheumatoid arthritis, diabetes mellitus type 2, hyperlipidemia, Felty syndrome with splenectomy, hypothyroidism.  Recently [place] [facility] [place] for sepsis with multiorgan failure with prolonged hospital stay for the month of February.  Attached is the discharge summary: "presented to the [facility] ED initially on 2/5/2025 for 4-day history of flu-like symptoms, including fever, SOB, productive cough, vomiting, and an inability tolerate solid food. She was admitted to [facility] MICU on 2/5/25 for sepsis with multiorgan failure. Echo demonstrated new onset HFrEF with severe LV dysfunction (EF of 10-15%), requiring right heart cath on 2/6 to assess hemodynamic status, determined to be mixed distributive/cardiogenic shock. The patient was also noted to have worsening renal function, with Cr elevation from baseline of 0.7. Patient was transferred to CCU on 2/7, started on dobutamine initially but switched to amiodarone after developing SVT. Renal function continued to worsen requiring HD on 2/11, 2/12, 2/14. [HD line was removed on 02/22/2025] Patient hospital stay was complicated after  a spontaneous retroperitoneal bleeding, anterior branch of left internal illiac with unknown source by IR in 2/11. Hgb decrease from 9 to 7 overnight of 2/10-2/11, given 3 units pRBC on 2/11 and 2 units on 2/12. Bilateral US consistent with nonocclusive thrombus in the mid right superficial femoral vein, and nonocclusive thrombus in the left peroneal vein in the calf.     Patient was stepped down to the floor from CCU on 2/14. After reviewing risks and benefits patient decided for an IVC filter vs undergoing heparin re-challenged. Patient IVC filter was placed on 2/19. Patient then converted back on " "sustained SVT that was not controlled with metoprolol, amiodarone, and pushes of adenosine. Patient underwent cardioversion 2/27 converting back to sinus rhythm. As part of the work-up for SVT, she underwent a V/Q on 2/24 showing multiple segmental defects in lower lobes of lung bilaterally. Based on shared decisions between patient and  cardiology, patient will continue to hold heparin gtt for now.      In addition, when patient initially presented to the hospital, she was found in iatrogenic hyperthyroidism. Patient was on home dose synthroid 200 mg. She was then discontinued, and started at a lower dose Synthroid 200 mg daily on 2/16. Inpatient endocrinology recommending re-check of labs within 6 -8 weeks after re-starting Synthroid.      Patient was stable for discharge on 2/28. Patient did not meet criteria for SNF since she was able to walk more than 250 ft. When was discharge home with PT/OT follow-up. Patient should have follow-up appointment for IV filter removal after 3 months of placement, and ongoing discussion of anticoagulation."    In terms of her renal history, she had the above PORSHA req iHD. Denies known history of nephrolithiasis or hematuria episodes. No reported history of frequent or recurrent use of NSAIDs/COXI. She has a history of seropositive RA- currently not on DMARD. Denies any pertinent family history of known kidney disease, or family members with ESRD requiring dialysis.  Other pertinent Uro/gyn history: denies  Smoking history: quit 18 years ago.  Fluid intake in 24hrs: 1.5L fluid restriction.  Cardiology: Dr. Ibarra    Interval history:  3/27/25 -   She has no uremic or urinary symptoms and is in her usual state of health.    During this visit, the patient and I reviewed her lab trends, discussed CKD epidemiology and risk factors, as well as general lifestyle and risk factor modifications to reduce her risk of progression to ESRD.   Recently saw PCP; bicarb dose reduced; started on " SGLT2-I  On hydralazine/imdur for GDMT of CHF  PORSHA improving from hospitalization still; discussed it may settle out vs continue to improve from prior ATN, time will tell.  Lasix recently increased from 40mg QD to 40mg QD plus 20mg in afternoon  Potassium tablet added.     25  She is here today for f/u. Feeling much better. Appetite is improved. Avoiding dehydration. Driving herself again. Denies BATES other than climbing a flight of stairs. We reviewed recent labs at chairside.  Renal fxn based on sCr appears to have improved further to sCr of 1.4mg/dL    25 - she is back for f/u evaluation. Continues to feel great. Denies limitations from physical activity. Swimming in her backyard over the weekend. Appetite is good. Avoiding dehydration.   We reviewed recent labs at chairside. Renal fxn based on sCr is returned to the 1.9-2.2mg/dL range but stabilizing. Discussed possibility of the prior 1.4mg/dL being an outlier value.        Review of Systems   Constitutional:  Negative for chills, diaphoresis and fever.   Respiratory:  Negative for cough and shortness of breath.    Cardiovascular:  Negative for chest pain and leg swelling.   Gastrointestinal:  Negative for abdominal pain, diarrhea, nausea and vomiting.   Genitourinary:  Negative for difficulty urinating, dysuria and hematuria.   Musculoskeletal:  Negative for myalgias.       The past medical, family and social histories were reviewed for this encounter.     Past Medical History:   Diagnosis Date    Diabetes 2012    H/O blood clots 2025    lungs    Hypertension     Hypothyroid 2012    Influenza 2025    hospitalized    Rheumatoid arthritis 2012     Past Surgical History:   Procedure Laterality Date     SECTION      1    COLONOSCOPY N/A 2023    Procedure: COLONOSCOPY;  Surgeon: Rinku Olmstead MD;  Location: [facility];  Service: Endoscopy;  Laterality: N/A;    COSMETIC SURGERY      breast augmentation and removal     AYALA FILTER PLACEMENT  02/2025    HERNIA REPAIR      left LUQ incision    IVC FILTER RETRIEVAL N/A 6/16/2025    Procedure: REMOVAL-FILTER-IVC;  Surgeon: Elizabeth Maddox MD;  Location: [facility] CATH/EP LAB;  Service: Vascular;  Laterality: N/A;    SPLENECTOMY, TOTAL      age 43 // main O    TONSILLECTOMY       Social History[1]  Current Outpatient Medications   Medication Sig    amiodarone (PACERONE) 200 MG Tab Take 0.5 tablets (100 mg total) by mouth once daily.    blood sugar diagnostic Strp To check BG 1 times daily, to use with insurance preferred meter    cetirizine (ZYRTEC) 10 MG tablet Take 10 mg by mouth once daily.    empagliflozin (JARDIANCE) 25 mg tablet Take 1 tablet (25 mg total) by mouth once daily.    furosemide (LASIX) 40 MG tablet Take 1 tablet (40 mg total) by mouth once daily.    glimepiride (AMARYL) 2 MG tablet TAKE 1 TABLET (2 MG TOTAL) BY MOUTH BEFORE BREAKFAST    insulin aspart U-100 (NOVOLOG FLEXPEN U-100 INSULIN) 100 unit/mL (3 mL) InPn pen Inject 5 Units into the skin 3 (three) times daily with meals. ((PRN sliding scale -199: 2 units  -249: 4 units  -299: 7 units  -349: 10 units  BG Over 350: 12 units )) Max daily dose 25 units    lancets Misc To check BG 1 times daily, to use with insurance preferred meter    levothyroxine (SYNTHROID, LEVOTHROID) 175 MCG tablet Take 1 tablet (175 mcg total) by mouth before breakfast.    losartan (COZAAR) 25 MG tablet Take 1 tablet (25 mg total) by mouth once daily.    metoprolol succinate (TOPROL-XL) 200 MG 24 hr tablet Take 1 tablet (200 mg total) by mouth once daily.    multivitamin with folic acid 400 mcg Tab Take 1 tablet by mouth once daily.    pravastatin (PRAVACHOL) 10 MG tablet Take 1 tablet (10 mg total) by mouth once daily.    semaglutide (OZEMPIC) 0.25 mg or 0.5 mg (2 mg/3 mL) pen injector Inject 0.5 mg into the skin every 7 days. 0.25 mg  SQ every 7 days for 4 wks, then 0.5 mg SQ Q 7 days    traMADoL (ULTRAM) 50 mg  "tablet Take 1 tablet (50 mg total) by mouth every 12 (twelve) hours as needed for Pain (rheumatoid arthritis).    triamcinolone acetonide 0.1% (KENALOG) 0.1 % ointment Apply topically 2 (two) times daily.     No current facility-administered medications for this visit.         Objective:   /66 (BP Location: Left arm, Patient Position: Sitting)   Pulse 76   Ht 5' 3" (1.6 m)   SpO2 96%   BMI 31.36 kg/m²      Physical Exam  Vitals reviewed.   Constitutional:       General: She is not in acute distress.     Appearance: Normal appearance.   HENT:      Head: Normocephalic.   Cardiovascular:      Heart sounds: Normal heart sounds.      No friction rub.   Pulmonary:      Effort: Pulmonary effort is normal. No respiratory distress.      Breath sounds: Normal breath sounds.   Abdominal:      General: Abdomen is flat.      Palpations: Abdomen is soft.   Musculoskeletal:         General: No swelling.      Right lower leg: Edema (trace) present.      Left lower leg: Edema (trace) present.   Neurological:      Mental Status: She is oriented to person, place, and time.      Motor: No weakness.   Psychiatric:         Behavior: Behavior normal.         Assessment:     Lab Results   Component Value Date    CREATININE 2.0 (H) 07/21/2025    BUN 53 (H) 07/21/2025     07/21/2025    K 4.6 07/21/2025     07/21/2025    CO2 29 07/21/2025     Lab Results   Component Value Date    .2 (H) 07/17/2025    CALCIUM 10.1 07/21/2025    PHOS 5.4 (H) 07/17/2025     Lab Results   Component Value Date    HCT 39.8 07/21/2025     Urine Protein/Creatinine Ratio   Date Value Ref Range Status   07/17/2025   Final     Comment:     UNABLE TO CALCULATE   05/01/2025   Final     Comment:     UNABLE TO CALCULATE     Prot/Creat Ratio, Urine   Date Value Ref Range Status   10/24/2014 0.1 0.0 - 0.2 Final   04/01/2014 Unable to calculate 0.0 - 0.2 Final   06/20/2013 UNABLE TO CALCULATE 0.0 - 0.2 Final       Lab Results   Component Value Date "    MICALBCREAT  05/01/2025      Comment:      UNABLE TO CALCULATE    MICALBCREAT 94.1 (H) 03/19/2025    MICALBCREAT Unable to calculate 02/06/2024    MICALBCREAT 14.8 10/29/2022    MICALBCREAT Unable to calculate 05/06/2021    MICALBCREAT 15.1 11/21/2018 02/05/2025 RPE [place].-right kidney 12 cm, left kidney 13.4 cm      1. Stage 3b chronic kidney disease          Plan:   Return to clinic in 3 months  Baseline creatinine is 0.7mg/dL prior to prolonged hospital stay, now appears to be settling out in 1.5-2.0 range.    Orders Placed This Encounter   Procedures    CBC Auto Differential    Protein/Creatinine Ratio, Urine    Urinalysis    PTH, Intact    Renal Function Panel    Microalbumin/Creatinine Ratio, Urine       CKD G3b/ A1  -noted during recent prolonged hospitalization in February of 2025, briefly required hemodialysis for decompensated renal failure x3 sessions during hospital stay.  Dialysis catheter removed on 02/22/2025.    -good baseline renal function based on historical data trends prior to hospital event.  -on RASI, SGLT2-I for CKD-MACE risk reduction, proteinuria reduction and aguila-protection  -also on GLP1  -albuminuria is controlled.  -continue discussion and adjustment of modifiable risk factors. Educated patient on the importance of BP, glycemic and lipid control   -avoid dehydration  -estimated low risk of renal progression based on KFRE model    -continue f/u with rheumatology for seropositive RA    -continue f/u with cardiology for CHF. On GDMT currently. LVEF improved to 50-55% on August 2025 TTE.     -BP trends reviewed.  Medication list reviewed.  Continue current medications including ARB for now.     -Secondary Hyperparathyroidism noted on recent draw - Ca wnl. On loop diuretic. On vit d OTCreplacement    -normocytic anemia; iron levels checked last month and acceptable. Following with PCP for this.   __________________________  Wayne Barrientos MD  [facility]  of this note has been created using [employer] dictation system. Errors in transcription may not be completely avoided.    KFRE 2-Year: 5.2% at 2025  2:35 PM  Calculated from:  Serum Creatinine: 2 mg/dL at 2025  2:35 PM  Urine Albumin Creatinine Ratio: 94.1 ug/mg at 3/19/2025  8:13 AM  Age: 60 years  Sex: Female at 2025  2:35 PM  Has CKD-3 to CKD-5: No    KFRE 5-Year: 15.4% at 2025  2:35 PM  Calculated from:  Serum Creatinine: 2 mg/dL at 2025  2:35 PM  Urine Albumin Creatinine Ratio: 94.1 ug/mg at 3/19/2025  8:13 AM  Age: 60 years  Sex: Female at 2025  2:35 PM  Has CKD-3 to CKD-5: No         [1]   Social History  Socioeconomic History    Marital status:    Tobacco Use    Smoking status: Former     Current packs/day: 0.00     Types: Cigarettes     Quit date: 2009     Years since quittin.4    Smokeless tobacco: Never   Substance and Sexual Activity    Alcohol use: Yes     Comment: occassionally    Drug use: No    Sexual activity: Yes     Social Drivers of Health     Financial Resource Strain: Low Risk  (2025)    Received from [facility]    Overall Financial Resource Strain (CARDIA)     Difficulty of Paying Living Expenses: Not hard at all   Food Insecurity: No Food Insecurity (2025)    Received from [facility]    Hunger Vital Sign     Worried About Running Out of Food in the Last Year: Never true     Ran Out of Food in the Last Year: Never true   Transportation Needs: No Transportation Needs (2025)    Received from [facility]    PRAPARE - Transportation     Lack of Transportation (Medical): No     Lack of Transportation (Non-Medical): No   Physical Activity: Inactive (2025)    Received from [facility]    Exercise Vital Sign     Days of Exercise per Week: 0 days     Minutes of Exercise per Session: 0 min   Stress: No Stress Concern Present (2025)    Received from [facility]    [employer] - Occupational Stress  Questionnaire     Feeling of Stress : Not at all   Housing Stability: Low Risk  (2/5/2025)    Received from [facility]    Housing Stability Vital Sign     Unable to Pay for Housing in the Last Year: No     Number of Times Moved in the Last Year: 0     Homeless in the Last Year: No

## 2025-08-13 DIAGNOSIS — Z12.31 OTHER SCREENING MAMMOGRAM: ICD-10-CM

## 2025-08-15 ENCOUNTER — LAB VISIT (OUTPATIENT)
Dept: LAB | Facility: HOSPITAL | Age: 60
End: 2025-08-15
Attending: INTERNAL MEDICINE
Payer: COMMERCIAL

## 2025-08-15 DIAGNOSIS — M05.79 SEROPOSITIVE RHEUMATOID ARTHRITIS OF MULTIPLE SITES: ICD-10-CM

## 2025-08-15 LAB
ABSOLUTE EOSINOPHIL (OHS): 0.4 K/UL
ABSOLUTE MONOCYTE (OHS): 0.94 K/UL (ref 0.3–1)
ABSOLUTE NEUTROPHIL COUNT (OHS): 5.03 K/UL (ref 1.8–7.7)
BASOPHILS # BLD AUTO: 0.13 K/UL
BASOPHILS NFR BLD AUTO: 1.1 %
CRP SERPL-MCNC: 3.5 MG/L
ERYTHROCYTE [DISTWIDTH] IN BLOOD BY AUTOMATED COUNT: 15.8 % (ref 11.5–14.5)
ERYTHROCYTE [SEDIMENTATION RATE] IN BLOOD BY PHOTOMETRIC METHOD: 18 MM/HR
HCT VFR BLD AUTO: 40.4 % (ref 37–48.5)
HGB BLD-MCNC: 12.6 GM/DL (ref 12–16)
IMM GRANULOCYTES # BLD AUTO: 0.03 K/UL (ref 0–0.04)
IMM GRANULOCYTES NFR BLD AUTO: 0.3 % (ref 0–0.5)
LYMPHOCYTES # BLD AUTO: 4.98 K/UL (ref 1–4.8)
MCH RBC QN AUTO: 29.3 PG (ref 27–31)
MCHC RBC AUTO-ENTMCNC: 31.2 G/DL (ref 32–36)
MCV RBC AUTO: 94 FL (ref 82–98)
NUCLEATED RBC (/100WBC) (OHS): 0 /100 WBC
PLATELET # BLD AUTO: 323 K/UL (ref 150–450)
PMV BLD AUTO: 11 FL (ref 9.2–12.9)
RBC # BLD AUTO: 4.3 M/UL (ref 4–5.4)
RELATIVE EOSINOPHIL (OHS): 3.5 %
RELATIVE LYMPHOCYTE (OHS): 43.3 % (ref 18–48)
RELATIVE MONOCYTE (OHS): 8.2 % (ref 4–15)
RELATIVE NEUTROPHIL (OHS): 43.6 % (ref 38–73)
WBC # BLD AUTO: 11.51 K/UL (ref 3.9–12.7)

## 2025-08-15 PROCEDURE — 85025 COMPLETE CBC W/AUTO DIFF WBC: CPT

## 2025-08-15 PROCEDURE — 86140 C-REACTIVE PROTEIN: CPT

## 2025-08-15 PROCEDURE — 36415 COLL VENOUS BLD VENIPUNCTURE: CPT | Mod: PN

## 2025-08-15 PROCEDURE — 85652 RBC SED RATE AUTOMATED: CPT

## 2025-08-21 ENCOUNTER — OFFICE VISIT (OUTPATIENT)
Dept: RHEUMATOLOGY | Facility: CLINIC | Age: 60
End: 2025-08-21
Payer: COMMERCIAL

## 2025-08-21 VITALS
WEIGHT: 180.56 LBS | BODY MASS INDEX: 31.99 KG/M2 | DIASTOLIC BLOOD PRESSURE: 74 MMHG | HEART RATE: 83 BPM | SYSTOLIC BLOOD PRESSURE: 141 MMHG | HEIGHT: 63 IN

## 2025-08-21 DIAGNOSIS — D84.9 IMMUNOSUPPRESSION: ICD-10-CM

## 2025-08-21 DIAGNOSIS — I50.20 HEART FAILURE WITH REDUCED EJECTION FRACTION: ICD-10-CM

## 2025-08-21 DIAGNOSIS — Z79.899 HIGH RISK MEDICATIONS (NOT ANTICOAGULANTS) LONG-TERM USE: ICD-10-CM

## 2025-08-21 DIAGNOSIS — M05.79 SEROPOSITIVE RHEUMATOID ARTHRITIS OF MULTIPLE SITES: Primary | ICD-10-CM

## 2025-08-21 PROCEDURE — 1159F MED LIST DOCD IN RCRD: CPT | Mod: CPTII,S$GLB,, | Performed by: INTERNAL MEDICINE

## 2025-08-21 PROCEDURE — 3008F BODY MASS INDEX DOCD: CPT | Mod: CPTII,S$GLB,, | Performed by: INTERNAL MEDICINE

## 2025-08-21 PROCEDURE — 1160F RVW MEDS BY RX/DR IN RCRD: CPT | Mod: CPTII,S$GLB,, | Performed by: INTERNAL MEDICINE

## 2025-08-21 PROCEDURE — 3078F DIAST BP <80 MM HG: CPT | Mod: CPTII,S$GLB,, | Performed by: INTERNAL MEDICINE

## 2025-08-21 PROCEDURE — 3077F SYST BP >= 140 MM HG: CPT | Mod: CPTII,S$GLB,, | Performed by: INTERNAL MEDICINE

## 2025-08-21 PROCEDURE — 99214 OFFICE O/P EST MOD 30 MIN: CPT | Mod: S$GLB,,, | Performed by: INTERNAL MEDICINE

## 2025-08-21 PROCEDURE — 99999 PR PBB SHADOW E&M-EST. PATIENT-LVL IV: CPT | Mod: PBBFAC,,, | Performed by: INTERNAL MEDICINE

## 2025-08-21 PROCEDURE — 4010F ACE/ARB THERAPY RXD/TAKEN: CPT | Mod: CPTII,S$GLB,, | Performed by: INTERNAL MEDICINE

## 2025-08-21 PROCEDURE — 3066F NEPHROPATHY DOC TX: CPT | Mod: CPTII,S$GLB,, | Performed by: INTERNAL MEDICINE

## 2025-08-21 PROCEDURE — 3046F HEMOGLOBIN A1C LEVEL >9.0%: CPT | Mod: CPTII,S$GLB,, | Performed by: INTERNAL MEDICINE

## 2025-08-21 PROCEDURE — 3061F NEG MICROALBUMINURIA REV: CPT | Mod: CPTII,S$GLB,, | Performed by: INTERNAL MEDICINE

## 2025-08-21 RX ORDER — LEFLUNOMIDE 20 MG/1
20 TABLET ORAL DAILY
Start: 2025-08-21 | End: 2025-09-20

## 2025-08-21 RX ORDER — HYDROXYCHLOROQUINE SULFATE 200 MG/1
200 TABLET, FILM COATED ORAL 2 TIMES DAILY
COMMUNITY
End: 2025-08-21 | Stop reason: SDUPTHER

## 2025-08-21 RX ORDER — HYDROXYCHLOROQUINE SULFATE 200 MG/1
200 TABLET, FILM COATED ORAL DAILY
Start: 2025-08-21 | End: 2026-08-21

## 2025-08-21 ASSESSMENT — ROUTINE ASSESSMENT OF PATIENT INDEX DATA (RAPID3)
FATIGUE SCORE: 1.1
PAIN SCORE: 6
PATIENT GLOBAL ASSESSMENT SCORE: 6
TOTAL RAPID3 SCORE: 5.11
MDHAQ FUNCTION SCORE: 1
PSYCHOLOGICAL DISTRESS SCORE: 2.2

## 2025-09-03 ENCOUNTER — OFFICE VISIT (OUTPATIENT)
Dept: CARDIOLOGY | Facility: CLINIC | Age: 60
End: 2025-09-03
Payer: COMMERCIAL

## 2025-09-03 VITALS
WEIGHT: 183.19 LBS | HEART RATE: 79 BPM | OXYGEN SATURATION: 96 % | SYSTOLIC BLOOD PRESSURE: 118 MMHG | BODY MASS INDEX: 32.46 KG/M2 | HEIGHT: 63 IN | DIASTOLIC BLOOD PRESSURE: 66 MMHG

## 2025-09-03 DIAGNOSIS — I42.8 NON-ISCHEMIC CARDIOMYOPATHY: ICD-10-CM

## 2025-09-03 DIAGNOSIS — I50.32 HEART FAILURE WITH RECOVERED EJECTION FRACTION (HFRECEF): ICD-10-CM

## 2025-09-03 DIAGNOSIS — I48.92 PAROXYSMAL ATRIAL FLUTTER: Primary | ICD-10-CM

## 2025-09-03 PROCEDURE — 3066F NEPHROPATHY DOC TX: CPT | Mod: CPTII,S$GLB,, | Performed by: INTERNAL MEDICINE

## 2025-09-03 PROCEDURE — 3074F SYST BP LT 130 MM HG: CPT | Mod: CPTII,S$GLB,, | Performed by: INTERNAL MEDICINE

## 2025-09-03 PROCEDURE — 1159F MED LIST DOCD IN RCRD: CPT | Mod: CPTII,S$GLB,, | Performed by: INTERNAL MEDICINE

## 2025-09-03 PROCEDURE — 3061F NEG MICROALBUMINURIA REV: CPT | Mod: CPTII,S$GLB,, | Performed by: INTERNAL MEDICINE

## 2025-09-03 PROCEDURE — 99214 OFFICE O/P EST MOD 30 MIN: CPT | Mod: S$GLB,,, | Performed by: INTERNAL MEDICINE

## 2025-09-03 PROCEDURE — 3008F BODY MASS INDEX DOCD: CPT | Mod: CPTII,S$GLB,, | Performed by: INTERNAL MEDICINE

## 2025-09-03 PROCEDURE — 99999 PR PBB SHADOW E&M-EST. PATIENT-LVL III: CPT | Mod: PBBFAC,,, | Performed by: INTERNAL MEDICINE

## 2025-09-03 PROCEDURE — 4010F ACE/ARB THERAPY RXD/TAKEN: CPT | Mod: CPTII,S$GLB,, | Performed by: INTERNAL MEDICINE

## 2025-09-03 PROCEDURE — 3078F DIAST BP <80 MM HG: CPT | Mod: CPTII,S$GLB,, | Performed by: INTERNAL MEDICINE

## 2025-09-03 PROCEDURE — 3046F HEMOGLOBIN A1C LEVEL >9.0%: CPT | Mod: CPTII,S$GLB,, | Performed by: INTERNAL MEDICINE

## (undated) DEVICE — GUIDEWIRE J 3MM .035IN 150

## (undated) DEVICE — SET RETRV IVC TULIP 6.3FR 80CM

## (undated) DEVICE — ADHESIVE DERMABOND MINI HV

## (undated) DEVICE — KIT MINI STICK MAX 5F 21GX10CM

## (undated) DEVICE — CONTRAST VISIPAQUE 50ML